# Patient Record
Sex: FEMALE | Race: WHITE | Employment: OTHER | URBAN - METROPOLITAN AREA
[De-identification: names, ages, dates, MRNs, and addresses within clinical notes are randomized per-mention and may not be internally consistent; named-entity substitution may affect disease eponyms.]

---

## 2017-01-11 ENCOUNTER — HOSPITAL ENCOUNTER (OUTPATIENT)
Dept: WOMENS IMAGING | Age: 77
Discharge: HOME | End: 2017-01-11
Attending: INTERNAL MEDICINE | Admitting: INTERNAL MEDICINE

## 2017-01-11 DIAGNOSIS — Z12.31 VISIT FOR SCREENING MAMMOGRAM: ICD-10-CM

## 2018-01-19 ENCOUNTER — HOSPITAL ENCOUNTER (OUTPATIENT)
Dept: WOMENS IMAGING | Age: 78
Discharge: HOME | End: 2018-01-19
Admitting: OBSTETRICS & GYNECOLOGY

## 2018-01-19 DIAGNOSIS — Z12.31 VISIT FOR SCREENING MAMMOGRAM: ICD-10-CM

## 2019-02-04 ENCOUNTER — HOSPITAL ENCOUNTER (OUTPATIENT)
Dept: WOMENS IMAGING | Age: 79
Discharge: HOME | End: 2019-02-04
Payer: MEDICARE

## 2019-02-04 DIAGNOSIS — Z12.31 VISIT FOR SCREENING MAMMOGRAM: ICD-10-CM

## 2019-02-04 PROCEDURE — 77063 BREAST TOMOSYNTHESIS BI: CPT

## 2020-02-13 ENCOUNTER — HOSPITAL ENCOUNTER (OUTPATIENT)
Dept: WOMENS IMAGING | Age: 80
Discharge: HOME | End: 2020-02-13
Payer: MEDICARE

## 2020-02-13 PROCEDURE — 77063 BREAST TOMOSYNTHESIS BI: CPT

## 2021-03-08 ENCOUNTER — HOSPITAL ENCOUNTER (OUTPATIENT)
Dept: WOMENS IMAGING | Age: 81
Discharge: HOME | End: 2021-03-08
Payer: MEDICARE

## 2021-03-08 DIAGNOSIS — Z12.31 BREAST CANCER SCREENING BY MAMMOGRAM: ICD-10-CM

## 2021-03-08 PROCEDURE — 77063 BREAST TOMOSYNTHESIS BI: CPT

## 2021-07-09 ENCOUNTER — APPOINTMENT (OUTPATIENT)
Dept: CT IMAGING | Age: 81
End: 2021-07-09
Payer: MEDICARE

## 2021-07-09 ENCOUNTER — HOSPITAL ENCOUNTER (OUTPATIENT)
Age: 81
Setting detail: OBSERVATION
Discharge: HOME | End: 2021-07-10
Attending: STUDENT IN AN ORGANIZED HEALTH CARE EDUCATION/TRAINING PROGRAM | Admitting: INTERNAL MEDICINE
Payer: MEDICARE

## 2021-07-09 DIAGNOSIS — R42 DIZZINESS: Primary | ICD-10-CM

## 2021-07-09 DIAGNOSIS — J39.2 NASOPHARYNGEAL MASS: ICD-10-CM

## 2021-07-09 LAB
A/G RATIO: 1.2 (ref 1.1–2.2)
ALBUMIN SERPL-MCNC: 4.2 G/DL (ref 3.4–5)
ALP BLD-CCNC: 111 U/L (ref 40–129)
ALT SERPL-CCNC: 11 U/L (ref 10–40)
ANION GAP SERPL CALCULATED.3IONS-SCNC: 13 MMOL/L (ref 3–16)
AST SERPL-CCNC: 30 U/L (ref 15–37)
BACTERIA: ABNORMAL /HPF
BASOPHILS ABSOLUTE: 0 K/UL (ref 0–0.2)
BASOPHILS RELATIVE PERCENT: 0.3 %
BILIRUB SERPL-MCNC: 0.3 MG/DL (ref 0–1)
BILIRUBIN URINE: NEGATIVE
BLOOD, URINE: NEGATIVE
BUN BLDV-MCNC: 17 MG/DL (ref 7–20)
CALCIUM SERPL-MCNC: 10.5 MG/DL (ref 8.3–10.6)
CHLORIDE BLD-SCNC: 98 MMOL/L (ref 99–110)
CLARITY: ABNORMAL
CO2: 23 MMOL/L (ref 21–32)
COLOR: YELLOW
CREAT SERPL-MCNC: 0.6 MG/DL (ref 0.6–1.2)
EOSINOPHILS ABSOLUTE: 0.1 K/UL (ref 0–0.6)
EOSINOPHILS RELATIVE PERCENT: 0.9 %
EPITHELIAL CELLS, UA: 0 /HPF (ref 0–5)
GFR AFRICAN AMERICAN: >60
GFR NON-AFRICAN AMERICAN: >60
GLOBULIN: 3.4 G/DL
GLUCOSE BLD-MCNC: 109 MG/DL (ref 70–99)
GLUCOSE URINE: NEGATIVE MG/DL
HCT VFR BLD CALC: 36.3 % (ref 36–48)
HEMOGLOBIN: 12.2 G/DL (ref 12–16)
HYALINE CASTS: 0 /LPF (ref 0–8)
KETONES, URINE: NEGATIVE MG/DL
LEUKOCYTE ESTERASE, URINE: NEGATIVE
LYMPHOCYTES ABSOLUTE: 0.6 K/UL (ref 1–5.1)
LYMPHOCYTES RELATIVE PERCENT: 7.6 %
MCH RBC QN AUTO: 29.8 PG (ref 26–34)
MCHC RBC AUTO-ENTMCNC: 33.6 G/DL (ref 31–36)
MCV RBC AUTO: 88.8 FL (ref 80–100)
MICROSCOPIC EXAMINATION: YES
MONOCYTES ABSOLUTE: 0.5 K/UL (ref 0–1.3)
MONOCYTES RELATIVE PERCENT: 6.5 %
NEUTROPHILS ABSOLUTE: 6.7 K/UL (ref 1.7–7.7)
NEUTROPHILS RELATIVE PERCENT: 84.7 %
NITRITE, URINE: POSITIVE
PDW BLD-RTO: 15.5 % (ref 12.4–15.4)
PH UA: 6.5 (ref 5–8)
PLATELET # BLD: 193 K/UL (ref 135–450)
PMV BLD AUTO: 7.7 FL (ref 5–10.5)
POTASSIUM REFLEX MAGNESIUM: 4.3 MMOL/L (ref 3.5–5.1)
PRO-BNP: 186 PG/ML (ref 0–449)
PROTEIN UA: NEGATIVE MG/DL
RBC # BLD: 4.08 M/UL (ref 4–5.2)
RBC UA: 1 /HPF (ref 0–4)
REASON FOR REJECTION: NORMAL
REJECTED TEST: NORMAL
SODIUM BLD-SCNC: 134 MMOL/L (ref 136–145)
SPECIFIC GRAVITY UA: 1.02 (ref 1–1.03)
TOTAL PROTEIN: 7.6 G/DL (ref 6.4–8.2)
TROPONIN: <0.01 NG/ML
URINE REFLEX TO CULTURE: ABNORMAL
URINE TYPE: ABNORMAL
UROBILINOGEN, URINE: 0.2 E.U./DL
WBC # BLD: 7.9 K/UL (ref 4–11)
WBC UA: 1 /HPF (ref 0–5)

## 2021-07-09 PROCEDURE — 2580000003 HC RX 258: Performed by: STUDENT IN AN ORGANIZED HEALTH CARE EDUCATION/TRAINING PROGRAM

## 2021-07-09 PROCEDURE — 6360000004 HC RX CONTRAST MEDICATION: Performed by: STUDENT IN AN ORGANIZED HEALTH CARE EDUCATION/TRAINING PROGRAM

## 2021-07-09 PROCEDURE — 84484 ASSAY OF TROPONIN QUANT: CPT

## 2021-07-09 PROCEDURE — G0378 HOSPITAL OBSERVATION PER HR: HCPCS

## 2021-07-09 PROCEDURE — 70498 CT ANGIOGRAPHY NECK: CPT

## 2021-07-09 PROCEDURE — 80053 COMPREHEN METABOLIC PANEL: CPT

## 2021-07-09 PROCEDURE — 81001 URINALYSIS AUTO W/SCOPE: CPT

## 2021-07-09 PROCEDURE — 36415 COLL VENOUS BLD VENIPUNCTURE: CPT

## 2021-07-09 PROCEDURE — 2580000003 HC RX 258: Performed by: INTERNAL MEDICINE

## 2021-07-09 PROCEDURE — 6370000000 HC RX 637 (ALT 250 FOR IP): Performed by: STUDENT IN AN ORGANIZED HEALTH CARE EDUCATION/TRAINING PROGRAM

## 2021-07-09 PROCEDURE — 99284 EMERGENCY DEPT VISIT MOD MDM: CPT

## 2021-07-09 PROCEDURE — 70450 CT HEAD/BRAIN W/O DYE: CPT

## 2021-07-09 PROCEDURE — 85025 COMPLETE CBC W/AUTO DIFF WBC: CPT

## 2021-07-09 PROCEDURE — 83880 ASSAY OF NATRIURETIC PEPTIDE: CPT

## 2021-07-09 RX ORDER — ONDANSETRON 2 MG/ML
4 INJECTION INTRAMUSCULAR; INTRAVENOUS EVERY 6 HOURS PRN
Status: DISCONTINUED | OUTPATIENT
Start: 2021-07-09 | End: 2021-07-10 | Stop reason: HOSPADM

## 2021-07-09 RX ORDER — 0.9 % SODIUM CHLORIDE 0.9 %
1000 INTRAVENOUS SOLUTION INTRAVENOUS ONCE
Status: COMPLETED | OUTPATIENT
Start: 2021-07-09 | End: 2021-07-09

## 2021-07-09 RX ORDER — POLYETHYLENE GLYCOL 3350 17 G/17G
17 POWDER, FOR SOLUTION ORAL DAILY PRN
Status: DISCONTINUED | OUTPATIENT
Start: 2021-07-09 | End: 2021-07-10 | Stop reason: HOSPADM

## 2021-07-09 RX ORDER — ALLOPURINOL 100 MG/1
100 TABLET ORAL 2 TIMES DAILY
Status: DISCONTINUED | OUTPATIENT
Start: 2021-07-09 | End: 2021-07-10 | Stop reason: HOSPADM

## 2021-07-09 RX ORDER — LEVOTHYROXINE SODIUM 88 UG/1
1 TABLET ORAL DAILY
COMMUNITY
Start: 2021-04-12

## 2021-07-09 RX ORDER — ALLOPURINOL 100 MG/1
1 TABLET ORAL 2 TIMES DAILY
COMMUNITY
Start: 2021-05-13

## 2021-07-09 RX ORDER — SODIUM CHLORIDE 0.9 % (FLUSH) 0.9 %
5-40 SYRINGE (ML) INJECTION PRN
Status: DISCONTINUED | OUTPATIENT
Start: 2021-07-09 | End: 2021-07-10 | Stop reason: HOSPADM

## 2021-07-09 RX ORDER — MECLIZINE HCL 12.5 MG/1
12.5 TABLET ORAL ONCE
Status: COMPLETED | OUTPATIENT
Start: 2021-07-09 | End: 2021-07-09

## 2021-07-09 RX ORDER — MECLIZINE HCL 12.5 MG/1
25 TABLET ORAL 3 TIMES DAILY PRN
Status: DISCONTINUED | OUTPATIENT
Start: 2021-07-09 | End: 2021-07-10 | Stop reason: HOSPADM

## 2021-07-09 RX ORDER — SODIUM CHLORIDE 9 MG/ML
25 INJECTION, SOLUTION INTRAVENOUS PRN
Status: DISCONTINUED | OUTPATIENT
Start: 2021-07-09 | End: 2021-07-10 | Stop reason: HOSPADM

## 2021-07-09 RX ORDER — ONDANSETRON 4 MG/1
4 TABLET, ORALLY DISINTEGRATING ORAL EVERY 8 HOURS PRN
Status: DISCONTINUED | OUTPATIENT
Start: 2021-07-09 | End: 2021-07-10 | Stop reason: HOSPADM

## 2021-07-09 RX ORDER — ASPIRIN 81 MG/1
81 TABLET, CHEWABLE ORAL DAILY
COMMUNITY

## 2021-07-09 RX ORDER — ACETAMINOPHEN 325 MG/1
650 TABLET ORAL EVERY 6 HOURS PRN
Status: DISCONTINUED | OUTPATIENT
Start: 2021-07-09 | End: 2021-07-10 | Stop reason: HOSPADM

## 2021-07-09 RX ORDER — SODIUM CHLORIDE 0.9 % (FLUSH) 0.9 %
5-40 SYRINGE (ML) INJECTION EVERY 12 HOURS SCHEDULED
Status: DISCONTINUED | OUTPATIENT
Start: 2021-07-09 | End: 2021-07-10 | Stop reason: HOSPADM

## 2021-07-09 RX ORDER — LEVOTHYROXINE SODIUM 88 UG/1
88 TABLET ORAL
Status: DISCONTINUED | OUTPATIENT
Start: 2021-07-10 | End: 2021-07-10 | Stop reason: HOSPADM

## 2021-07-09 RX ORDER — ASPIRIN 81 MG/1
81 TABLET, CHEWABLE ORAL DAILY
Status: DISCONTINUED | OUTPATIENT
Start: 2021-07-10 | End: 2021-07-10 | Stop reason: HOSPADM

## 2021-07-09 RX ORDER — ACETAMINOPHEN 650 MG/1
650 SUPPOSITORY RECTAL EVERY 6 HOURS PRN
Status: DISCONTINUED | OUTPATIENT
Start: 2021-07-09 | End: 2021-07-10 | Stop reason: HOSPADM

## 2021-07-09 RX ADMIN — Medication 10 ML: at 21:26

## 2021-07-09 RX ADMIN — IOPAMIDOL 75 ML: 755 INJECTION, SOLUTION INTRAVENOUS at 14:01

## 2021-07-09 RX ADMIN — MECLIZINE 12.5 MG: 12.5 TABLET ORAL at 13:34

## 2021-07-09 RX ADMIN — SODIUM CHLORIDE 1000 ML: 9 INJECTION, SOLUTION INTRAVENOUS at 13:33

## 2021-07-09 ASSESSMENT — PAIN SCALES - GENERAL: PAINLEVEL_OUTOF10: 0

## 2021-07-09 NOTE — ED NOTES
Bed: [facility]  Expected date:   Expected time:   Means of arrival: [facility]  Comments:  N/v     Clarita Burns RN  07/09/21 2829

## 2021-07-09 NOTE — ED PROVIDER NOTES
Gatherings with Friends and Family:     Attends Spiritism Services:     Active Member of Clubs or Organizations:     Attends Club or Organization Meetings:     Marital Status:    Intimate Partner Violence:     Fear of Current or Ex-Partner:     Emotionally Abused:     Physically Abused:     Sexually Abused:         Review of Systems   10 total systems reviewed and found to be negative unless otherwise noted in HPI     Physical Exam   /68   Pulse 87   Temp 97.8 °F (36.6 °C) (Oral)   Resp 18   Wt 151 lb 10.8 oz (68.8 kg)   SpO2 93%      CONSTITUTIONAL: Well appearing, in no acute distress   HEAD: atraumatic, normocephalic   EYES: PERRL, No injection, discharge or scleral icterus. ENT: Moist mucous membranes. NECK: Normal ROM, NO LAD   CARDIOVASCULAR: Regular rate and rhythm. No murmurs or gallop. PULMONARY/CHEST: Airway patent. No retractions. Breath sounds clear with good air entry bilaterally. ABDOMEN: Soft, Non-distended and non-tender, without guarding or rebound. SKIN: Acyanotic, warm, dry   MUSCULOSKELETAL: No swelling, tenderness or deformity   NEUROLOGICAL: Awake and oriented x 3. Pulses intact. Grossly nonfocal but difficulties with ambulation  Nursing note and vitals reviewed. NIH Stroke Scale     Time: 4:16 PM   Person Administering Scale: Nsehniitooniel Adan MD     Level of consciousness: [0]   0 = Alert;   1 = Not alert;   2 = Not alert;   3 = Responds only with reflex motor or autonomic effects or totally unresponsive, flaccid, and flexic. LOC questions: [0]   0 = Answers both questions correctly. 1 = Answers one question correctly. 2 = Answers neither question correctly. LOC commands: [0]   0 = Performs both tasks correctly. 1 = Performs one task correctly. 2 = Performs neither task correctly.      Best Gaze: [ 0 ]   0 = Normal   1 = Partial gaze palsy   2 = Forced deviation     Visual: [0]   0 = No visual loss   1 = Partial hemianopia   2 = Complete hemianopia   3 = Bilateral hemianopia     Facial Palsy: [0]   0 = Normal   1 = Minor paralysis   2 = Partial paralysis   3 = Complete paralysis     Motor left arm: [0]   0 = No drift;   1 = Drift   2 = Some effort against gravity;   3 = No effort against gravity;   4 = No movement. UN = Amputation     Motor right arm: [0]   0 = No drift   1 = Drift   2 = Some effort against gravity   3 = No effort against gravity   4 = No movement   UN = Amputation     Motor left leg: [0]   0 = No drift   1.= Drift   2 = Some effort against gravity   3 = No effort against gravity   4 = No movement. UN = Amputation     Motor right leg: [0]   0 = No drift   1 = Drift   2 = Some effort against gravity   3 = No effort against gravity   4 = No movement   UN = Amputation     Limb Ataxia: [0]   0 = Absent. 1 = Present in one limb. 2 = Present in two limbs   UN = Amputation     Sensory: [0]   0 = Absent   1.= Present in one limb   2 = Present in two limbs   UN = Amputation     Best Language: [0]   0 = No aphasia   1 = Mild-to-moderate aphasia   2 = Severe aphasia   3 = Mute, global aphasia     Dysarthria: [0]   1 = Mild-to-moderate dysarthria   2 = Severe dysarthria   UN = Intubated     Extinction and Inattention: [0]   0 = No abnormality.    1 = Visual, tactile, auditory, spatial, or personal inattention   2 = Profound baron-inattention or extinction to more than one modality     TOTAL: Lucy.Ring ]       ED Course & Medical Decision Making   Medications   levothyroxine (SYNTHROID) tablet 88 mcg (88 mcg Oral Given 7/10/21 0622)   aspirin chewable tablet 81 mg (81 mg Oral Given 7/10/21 0937)   allopurinol (ZYLOPRIM) tablet 100 mg (100 mg Oral Given 7/10/21 0937)   sodium chloride flush 0.9 % injection 5-40 mL (10 mLs Intravenous Given 7/10/21 0938)   sodium chloride flush 0.9 % injection 5-40 mL (has no administration in time range) 0.9 % sodium chloride infusion (has no administration in time range)   enoxaparin (LOVENOX) injection 40 mg (40 mg Subcutaneous Given 7/10/21 0938)   ondansetron (ZOFRAN-ODT) disintegrating tablet 4 mg (has no administration in time range)     Or   ondansetron (ZOFRAN) injection 4 mg (has no administration in time range)   polyethylene glycol (GLYCOLAX) packet 17 g (has no administration in time range)   acetaminophen (TYLENOL) tablet 650 mg (has no administration in time range)     Or   acetaminophen (TYLENOL) suppository 650 mg (has no administration in time range)   meclizine (ANTIVERT) tablet 25 mg (has no administration in time range)   0.9 % sodium chloride bolus (0 mLs Intravenous Stopped 7/9/21 1514)   meclizine (ANTIVERT) tablet 12.5 mg (12.5 mg Oral Given 7/9/21 1334)   iopamidol (ISOVUE-370) 76 % injection 75 mL (75 mLs Intravenous Given 7/9/21 1401)      Labs Reviewed   COMPREHENSIVE METABOLIC PANEL W/ REFLEX TO MG FOR LOW K - Abnormal; Notable for the following components:       Result Value    Sodium 134 (*)     Chloride 98 (*)     Glucose 109 (*)     All other components within normal limits    Narrative:     CALL  [place]  [facility] [phone],  Rejected Test CBCWD/Called to: LENORA Lay, 07/09/2021 13:44, by Karen Brown  Performed at:  [facility]  [address] [facility] [address]   Phone [phone]   URINE RT REFLEX TO CULTURE - Abnormal; Notable for the following components:    Clarity, UA CLOUDY (*)     Nitrite, Urine POSITIVE (*)     All other components within normal limits    Narrative:     Performed at:  [facility]  [address] [place], [place] [address]   Phone [phone]   CBC WITH AUTO DIFFERENTIAL - Abnormal; Notable for the following components:    RDW 15.5 (*)     Lymphocytes Absolute 0.6 (*)     All other components within normal limits    Narrative:     Performed at:  [facility]  [address] [place] [address]   Phone [phone]   MICROSCOPIC URINALYSIS - Abnormal; Notable for the following components:    Bacteria, UA 4+ (*)     All other components within normal limits    Narrative:     Performed at:  [facility]  [address] [address]   Phone [phone]   COMPREHENSIVE METABOLIC PANEL W/ REFLEX TO MG FOR LOW K - Abnormal; Notable for the following components:    Sodium 135 (*)     Albumin 3.3 (*)     Albumin/Globulin Ratio 1.0 (*)     ALT 9 (*)     All other components within normal limits    Narrative:     Performed at:  [facility]  [address] [address]   Phone [phone]   CBC WITH AUTO DIFFERENTIAL - Abnormal; Notable for the following components:    RDW 15.7 (*)     Lymphocytes Absolute 0.8 (*)     All other components within normal limits    Narrative:     Performed at:  [place] [address]   Phone [phone]   TROPONIN    Narrative:     Fco Pleva tel. [phone],  Rejected Test CBCWD/Called to: LENORA Plata, 07/09/2021 13:44, by Jeremy Ramesh  Performed at:  [facility]  [address] [address]   Phone [phone] PEPTIDE    Narrative:     Tsosie Mess [phone],  Rejected Test CBCWD/Called to: LENORA Plata, 07/09/2021 13:44, by Jeremy Ramesh  Performed at:  [address] [address]   Phone [phone]    Narrative:     Dorkathy Pleva tel. [phone],  Rejected Test CBCWD/Called to: LENORA Plata, 07/09/2021 13:44, by Jeremy Ramesh  Performed at:  [place] [address]   Phone [phone]   TSH WITHOUT REFLEX    Narrative:     Performed at:  [facility]  [address] [place], [place] [address]   Phone [phone]   T4, FREE    Narrative:     Performed at:  [facility]  [address] [place], [place] [address]   Phone [phone]      CT Head WO Contrast   Final Result   No acute intracranial abnormality. Soft tissue density mass in the left ethmoid air cells extending into the   left nasal passageway is nonspecific. This most likely represents a nasal   polyp. ENT referral is suggested for direct visualization and better   evaluation. CTA HEAD NECK W CONTRAST   Preliminary Result   1. No large vessel occlusion, significant stenosis or cerebral aneurysm   identified. 2. No significant arterial stenosis identified within the neck. 3. Multiple indeterminate ground-glass nodules within the upper lobes with   concomitant interstitial septal thickening concerning for an atypical   infectious process. A dedicated CT of the chest is recommended for further   evaluation. MRI BRAIN WO CONTRAST    (Results Pending)      CT Head WO Contrast    Result Date: 7/9/2021  EXAMINATION: CT OF THE HEAD WITHOUT CONTRAST  7/9/2021 2:01 pm TECHNIQUE: CT of the head was performed without the administration of intravenous contrast. Dose modulation, iterative reconstruction, and/or weight based adjustment of the mA/kV was utilized to reduce the radiation dose to as low as reasonably achievable. COMPARISON: None. HISTORY: ORDERING SYSTEM PROVIDED HISTORY: dizziness TECHNOLOGIST PROVIDED HISTORY: Reason for exam:->dizziness Has a \"code stroke\" or \"stroke alert\" been called? ->No Decision Support Exception - unselect if not a suspected or confirmed emergency medical condition->Emergency Medical Condition (MA) FINDINGS: BRAIN/VENTRICLES: There is no acute intracranial hemorrhage, mass effect or midline shift. No abnormal extra-axial fluid collection.   The gray-white differentiation is maintained without evidence of an acute infarct. There is no evidence of hydrocephalus. ORBITS: The visualized portion of the orbits demonstrate no acute abnormality. SINUSES: There is an ovoid focal soft tissue density opacity in the left ethmoid air cells. This measures approximately 2 cm in craniocaudal dimension approximately 1 cm in AP dimension and approximately 0.5 cm in transverse dimension. SOFT TISSUES/SKULL:  No acute abnormality of the visualized skull or soft tissues. No acute intracranial abnormality. Soft tissue density mass in the left ethmoid air cells extending into the left nasal passageway is nonspecific. This most likely represents a nasal polyp. ENT referral is suggested for direct visualization and better evaluation. CTA HEAD NECK W CONTRAST    Result Date: 7/9/2021  EXAMINATION: CTA OF THE HEAD AND NECK WITH CONTRAST 7/9/2021 2:01 pm: TECHNIQUE: CTA of the head and neck was performed with the administration of intravenous contrast. Multiplanar reformatted images are provided for review. MIP images are provided for review. Stenosis of the internal carotid arteries measured using NASCET criteria. Dose modulation, iterative reconstruction, and/or weight based adjustment of the mA/kV was utilized to reduce the radiation dose to as low as reasonably achievable. COMPARISON: Noncontrast CT brain earlier same day. HISTORY: ORDERING SYSTEM PROVIDED HISTORY: [facility] TECHNOLOGIST PROVIDED HISTORY: Reason for exam:->[facility] Decision Support Exception - unselect if not a suspected or confirmed emergency medical condition->Emergency Medical Condition (MA) Reason for Exam: dizziness Acuity: Acute Type of Exam: Initial FINDINGS: CTA NECK: AORTIC ARCH/ARCH VESSELS: Mild atherosclerotic calcifications are present within the aortic arch. There is no significant stenosis of the innominate or subclavian arteries identified.  CAROTID ARTERIES: The common carotid arteries are normal in appearance. There is no significant stenosis or dissection. Mild atherosclerotic calcifications are present at the origin of the left internal carotid artery. There is no significant stenosis of the internal carotid arteries based on NASCET criteria. There is no dissection or pseudoaneurysm identified. VERTEBRAL ARTERIES: No dissection, arterial injury, or significant stenosis. SOFT TISSUES: Multiple ground-glass pulmonary nodules are present within the upper lobes, incompletely evaluated. There is interstitial septal thickening within the upper lobes. There is no pathologically enlarged lymphadenopathy. There is no soft tissue mass identified. The parotid glands and submandibular glands are normal in appearance. BONES: No lytic or blastic osseous lesions are identified. There are posterior disc osteophyte complexes at C5-6 and C6-7 resulting in mild spinal canal stenosis and moderate to severe bilateral neural foraminal narrowing. 3-D surface reformations were performed and concur with the above findings. CTA HEAD: ANTERIOR CIRCULATION: The intracranial segments of the internal carotid arteries are normal.  There is no significant stenosis or aneurysm identified. The anterior and middle cerebral arteries are normal in appearance. No significant stenosis or aneurysm is identified. POSTERIOR CIRCULATION: The distal vertebral arteries are normal in appearance. The basilar artery is normal.  No significant stenosis or aneurysm is identified. The posterior cerebral arteries are normal in appearance. OTHER: No dural venous sinus thrombosis on this non-dedicated study. BRAIN: There is no mass effect or midline shift. There is no vascular malformation identified. 3-D surface reformations were performed and concur with the above findings. 1. No large vessel occlusion, significant stenosis or cerebral aneurysm identified. 2. No significant arterial stenosis identified within the neck.  3. Multiple indeterminate ground-glass nodules within the upper lobes with concomitant interstitial septal thickening concerning for an atypical infectious process. A dedicated CT of the chest is recommended for further evaluation. EKG INTERPRETATION:  EKG by my preliminary interpretation shows sinus rhythm f , normal axis, normal intervals, with no ST changes indicative of ischemia at this time. PROCEDURES:   Procedures    ASSESSMENT AND PLAN:  Lennox Ramirez is a 78 y.o. female vertigo will start on and off symptoms in the past 5 years presenting this morning complaining of severe dizziness which is different from symptoms she has had in the past.  She stated that she got up this morning on a very dizzy and lightheaded. As the symptoms was nausea with no vomiting. On exam patient appears nontoxic in no acute distress no stroke scale of 0 NIH stroke scale of 0. Nonetheless because of the significant of her symptoms I was concerned for stroke specifically posterior stroke. I did obtain a CT of the head and CT of the head and neck results showed no stroke but Soft tissue density mass in the left ethmoid air. Labs including EKG and chest x-ray showed no abnormal findings. Patient was given a liter of fluid stated that she felt better. I attempted to ambulate her the second time and patient was still not able to ambulate. At this time I do not know if her symptoms are secondary to the soft tissue mass seen in the CT on the left ear versus posterior stroke. But given the fact that patient is symptomatic unable to ambulate I am recommending that she be admitted for possible evaluation for posterior stroke with an MRI versus ENT consult for this findings on her CT scan. Hospitalist has been consulted pending admission. ClINICAL IMPRESSION:  1. Dizziness          DISPOSITION Admitted 07/09/2021 07:06:59 PM   -Findings and recommendations explained to patient and daughter.  They expressed understanding and agreed with the plan. Jane Sanchez MD (electronically signed)  7/10/2021  _________________________________________________________________________________________  Amount and/or Complexity of Data Reviewed:  Clinical lab tests: ordered and reviewed   Tests in the radiology section of CPT®: ordered and reviewed   Tests in the medicine section of CPT®: ordered and reviewed   Decide to obtain previous medical records or to obtain history from someone other than the patient: no  Obtain history from someone other than the patient: no  Review and summarize past medical records:yes  I looked up the patient in our electronic medical record:yes  Discuss the patient with other providers:yes  Independent visualization of images, tracings, or specimens:yes  Risk of Complications, Morbidity, and/or Mortality:Moderate  Presenting problems: moderate Diagnostic procedures: moderate yes Management options: moderate     _________________________________________________________________________________________  This record is transcribed utilizing voice recognition technology. There are inherent limitations in this technology. In addition, there may be limitations in editing of this report. If there are any discrepancies, please contact me directly.         Jane Sanchez MD  07/10/21 3728

## 2021-07-09 NOTE — ED NOTES
Attempted to ambulate the patient because the patients states that laying still she was no longer dizzy or nauseous. Once the patient stood up she states that he got dizzy and weak so she was put back in bed.       [facility]  07/09/21 7642

## 2021-07-09 NOTE — ED TRIAGE NOTES
Bhargavi Boateng is a 78 y.o. female was brought by EMS for dizziness and lightheadedness. The patient states that she woke up lightheaded and its been getting worse. The patient received 4mg of Zofran that helped with nausea. The patient is alert and oriented with an open and patent airway with a normal respiratory effort.

## 2021-07-09 NOTE — H&P
Hospital Medicine History & Physical      PCP: Moy Peres MD    Date of Admission: 7/9/2021    Date of Service: Pt seen/examined on 7/9/2021 and Placed in Observation. Chief Complaint:  Lightheadedness, dizziness      History Of Present Illness: The patient is a 78 y.o. female with hx pulmonary sarcoidosis, hypothyroidism, hypercalcemia 2/2 hyperparathyroidism who presents to [facility] with lightheadedness and dizziness. Patient states that she was returning from the bathroom earlier today when she felt lightheaded and dizzy. States that the room was moving around. She did not lose consciousness. Stated that the dizziness seemed to worsen with head movement and improve with rest. She has associated nausea and vomiting. Stated she had once episode several years back that was a bit different than this one. Denies any recent illness or sick contacts. She is fully vaccinated against COVID. Denies fever, chills, chest pain, shortness of breath, constipation, diarrhea, and dysuria. In the ED, labs were significant for a sodium of 134. U/A did not show significant WBCs. CT Head without contrast showed no acute intracranial abnormality, with a soft tissue density mass in the left ethmoid air cells extending into the left nasal passageway that is nonspecific. CTA Head and Neck with contrast showed no large vessel occlusion, significant stenosis or cerebral aneurysm or significant arterial stenosis within the neck, with multiple indeterminate ground-glass nodules within the upper lobes. Past Medical History:    No past medical history on file. Past Surgical History:    No past surgical history on file. Medications Prior to Admission:    Prior to Admission medications    Medication Sig Start Date End Date Taking?  Authorizing Provider allopurinol (ZYLOPRIM) 100 MG tablet Take 1 tablet by mouth 2 times daily 5/13/21  Yes Historical Provider, MD   aspirin 81 MG chewable tablet Take 81 mg by mouth daily   Yes Historical Provider, MD   levothyroxine (SYNTHROID) 88 MCG tablet Take 1 tablet by mouth daily 4/12/21  Yes Historical Provider, MD       Allergies:  Sulfa antibiotics    Social History:  The patient currently lives at home. TOBACCO:   has no history on file for tobacco use. ETOH:   has no history on file for alcohol use. Family History:  Reviewed in detail and negative for DM, Early CAD, Cancer, CVA. Positive as follows:    No family history on file. REVIEW OF SYSTEMS:   Positive for and as noted in the HPI. All other systems reviewed and negative. PHYSICAL EXAM:    BP (!) 152/93   Pulse 86   Temp 98.5 °F (36.9 °C) (Oral)   Resp 25   Wt 151 lb 10.8 oz (68.8 kg)   SpO2 97%     General appearance: No apparent distress appears stated age and cooperative. HEENT Normal cephalic, atraumatic without obvious deformity. Pupils equal, round, and reactive to light. Extra ocular muscles intact. Conjunctivae/corneas clear. Neck: Supple, No jugular venous distention/bruits. Trachea midline without thyromegaly or adenopathy with full range of motion. Lungs: Clear to auscultation, bilaterally without Rales/Wheezes/Rhonchi with good respiratory effort. Heart: Regular rate and rhythm with Normal S1/S2 without murmurs, rubs or gallops, point of maximum impulse non-displaced  Abdomen: Soft, non-tender or non-distended without rigidity or guarding and positive bowel sounds all four quadrants. Extremities: No clubbing, cyanosis, or edema bilaterally. Full range of motion without deformity and normal gait intact. Skin: Skin color, texture, turgor normal.  No rashes or lesions. Neurologic: Alert and oriented X 3, neurovascularly intact with sensory/motor intact upper extremities/lower extremities, bilaterally.   Cranial nerves: II-XII intact, grossly non-focal.  Mental status: Alert, oriented, thought content appropriate. Capillary Refill: Acceptable  < 3 seconds  Peripheral Pulses: +3 Easily felt, not easily obliterated with pressure    CT Head WO Contrast   Final Result   No acute intracranial abnormality. Soft tissue density mass in the left ethmoid air cells extending into the   left nasal passageway is nonspecific. This most likely represents a nasal   polyp. ENT referral is suggested for direct visualization and better   evaluation. CTA HEAD NECK W CONTRAST   Preliminary Result   1. No large vessel occlusion, significant stenosis or cerebral aneurysm   identified. 2. No significant arterial stenosis identified within the neck. 3. Multiple indeterminate ground-glass nodules within the upper lobes with   concomitant interstitial septal thickening concerning for an atypical   infectious process. A dedicated CT of the chest is recommended for further   evaluation. MRI BRAIN WO CONTRAST    (Results Pending)       CBC   Recent Labs     07/09/21  1313   WBC 7.9   HGB 12.2   HCT 36.3         RENAL  Recent Labs     07/09/21  1313   *   K 4.3   CL 98*   CO2 23   BUN 17   CREATININE 0.6     LFT'S  Recent Labs     07/09/21  1313   AST 30   ALT 11   BILITOT 0.3   ALKPHOS 111     COAG  No results for input(s): INR in the last 72 hours.   CARDIAC ENZYMES  Recent Labs     07/09/21  1313   TROPONINI <0.01       U/A:    Lab Results   Component Value Date    COLORU YELLOW 07/09/2021    WBCUA 1 07/09/2021    RBCUA 1 07/09/2021    BACTERIA 4+ 07/09/2021    CLARITYU CLOUDY 07/09/2021    SPECGRAV 1.025 07/09/2021    LEUKOCYTESUR Negative 07/09/2021    BLOODU Negative 07/09/2021    GLUCOSEU Negative 07/09/2021       ABG  No results found for: CHONG Marquez Peterson [place], [place]        Active Hospital Problems    Diagnosis Date Noted    Vertigo [R42] 07/09/2021         PHYSICIANS

## 2021-07-09 NOTE — ED NOTES
Patient was given a turkey sandwich and ice water.       [facility]  07/09/21 9700

## 2021-07-09 NOTE — PROGRESS NOTES
Medication Reconciliation    List of medications for Karlene Walsh is currently taking is complete. Source of information:   Epic records  Conversation with patient and family at bedside     Allergies  Sulfa antibiotics     Notes regarding home medications:   Patient received all of their home medications prior to arrival to the emergency department    Pharmacy Medication History    Medication history has been completed by: Student    Patient's Adherence: Adherent - No Missed Doses  Reasons for Response Above: patient knew her medication well. Sources of Information: Self (Patient), Family, and Pharmacy     Allergies: Allergy list reviewed, no new allergies added      The Prior to Admission Medications were reviewed and the following discrepancies were identified:  Medications prescribed, but were not listed on the Prior to Admission Medication list:  Allopurinol 100 mg, Aspirin 81 mg, and Levothyroxine 88 mcg      Medications not currently prescribed but were on the Prior to Admission Medication list (include reason): None/Not Identified       Discrepancies discovered in dose, route, or frequency on the Prior to Admission Medication list: None/Not Identified       Medications prescribed but not taking (include the reason): None/Not Identified       Other information related to the home medication history:     Pharmacy has collected and clarified patient's current medication list and updated this in EPIC, including prescriptions, over-the-counter medications, dietary, and herbal supplements.      Tone Smith, Pharmacy Student  7/9/2021 2:57 PM

## 2021-07-10 ENCOUNTER — APPOINTMENT (OUTPATIENT)
Dept: MRI IMAGING | Age: 81
End: 2021-07-10
Payer: MEDICARE

## 2021-07-10 VITALS
OXYGEN SATURATION: 93 % | RESPIRATION RATE: 18 BRPM | SYSTOLIC BLOOD PRESSURE: 113 MMHG | HEART RATE: 87 BPM | DIASTOLIC BLOOD PRESSURE: 68 MMHG | TEMPERATURE: 97.8 F | WEIGHT: 151.68 LBS

## 2021-07-10 LAB
A/G RATIO: 1 (ref 1.1–2.2)
ALBUMIN SERPL-MCNC: 3.3 G/DL (ref 3.4–5)
ALP BLD-CCNC: 94 U/L (ref 40–129)
ALT SERPL-CCNC: 9 U/L (ref 10–40)
ANION GAP SERPL CALCULATED.3IONS-SCNC: 9 MMOL/L (ref 3–16)
AST SERPL-CCNC: 36 U/L (ref 15–37)
BASOPHILS ABSOLUTE: 0 K/UL (ref 0–0.2)
BASOPHILS RELATIVE PERCENT: 0.7 %
BILIRUB SERPL-MCNC: 0.3 MG/DL (ref 0–1)
BUN BLDV-MCNC: 14 MG/DL (ref 7–20)
CALCIUM SERPL-MCNC: 9.3 MG/DL (ref 8.3–10.6)
CHLORIDE BLD-SCNC: 105 MMOL/L (ref 99–110)
CO2: 21 MMOL/L (ref 21–32)
CREAT SERPL-MCNC: 0.7 MG/DL (ref 0.6–1.2)
EOSINOPHILS ABSOLUTE: 0.4 K/UL (ref 0–0.6)
EOSINOPHILS RELATIVE PERCENT: 5.7 %
GFR AFRICAN AMERICAN: >60
GFR NON-AFRICAN AMERICAN: >60
GLOBULIN: 3.2 G/DL
GLUCOSE BLD-MCNC: 92 MG/DL (ref 70–99)
HCT VFR BLD CALC: 36.5 % (ref 36–48)
HEMOGLOBIN: 12.1 G/DL (ref 12–16)
LYMPHOCYTES ABSOLUTE: 0.8 K/UL (ref 1–5.1)
LYMPHOCYTES RELATIVE PERCENT: 11.4 %
MCH RBC QN AUTO: 29.5 PG (ref 26–34)
MCHC RBC AUTO-ENTMCNC: 33.3 G/DL (ref 31–36)
MCV RBC AUTO: 88.6 FL (ref 80–100)
MONOCYTES ABSOLUTE: 0.9 K/UL (ref 0–1.3)
MONOCYTES RELATIVE PERCENT: 12.9 %
NEUTROPHILS ABSOLUTE: 4.8 K/UL (ref 1.7–7.7)
NEUTROPHILS RELATIVE PERCENT: 69.3 %
PDW BLD-RTO: 15.7 % (ref 12.4–15.4)
PLATELET # BLD: 202 K/UL (ref 135–450)
PMV BLD AUTO: 7.9 FL (ref 5–10.5)
POTASSIUM REFLEX MAGNESIUM: 4.7 MMOL/L (ref 3.5–5.1)
RBC # BLD: 4.12 M/UL (ref 4–5.2)
SODIUM BLD-SCNC: 135 MMOL/L (ref 136–145)
T4 FREE: 1.4 NG/DL (ref 0.9–1.8)
TOTAL PROTEIN: 6.5 G/DL (ref 6.4–8.2)
TSH SERPL DL<=0.05 MIU/L-ACNC: 0.28 UIU/ML (ref 0.27–4.2)
WBC # BLD: 6.9 K/UL (ref 4–11)

## 2021-07-10 PROCEDURE — 94760 N-INVAS EAR/PLS OXIMETRY 1: CPT

## 2021-07-10 PROCEDURE — 97162 PT EVAL MOD COMPLEX 30 MIN: CPT

## 2021-07-10 PROCEDURE — G0378 HOSPITAL OBSERVATION PER HR: HCPCS

## 2021-07-10 PROCEDURE — 85025 COMPLETE CBC W/AUTO DIFF WBC: CPT

## 2021-07-10 PROCEDURE — 97535 SELF CARE MNGMENT TRAINING: CPT

## 2021-07-10 PROCEDURE — 6370000000 HC RX 637 (ALT 250 FOR IP): Performed by: INTERNAL MEDICINE

## 2021-07-10 PROCEDURE — 97116 GAIT TRAINING THERAPY: CPT

## 2021-07-10 PROCEDURE — 84443 ASSAY THYROID STIM HORMONE: CPT

## 2021-07-10 PROCEDURE — 84439 ASSAY OF FREE THYROXINE: CPT

## 2021-07-10 PROCEDURE — 36415 COLL VENOUS BLD VENIPUNCTURE: CPT

## 2021-07-10 PROCEDURE — 70551 MRI BRAIN STEM W/O DYE: CPT

## 2021-07-10 PROCEDURE — 6360000002 HC RX W HCPCS: Performed by: INTERNAL MEDICINE

## 2021-07-10 PROCEDURE — 97166 OT EVAL MOD COMPLEX 45 MIN: CPT

## 2021-07-10 PROCEDURE — 96372 THER/PROPH/DIAG INJ SC/IM: CPT

## 2021-07-10 PROCEDURE — 2580000003 HC RX 258: Performed by: INTERNAL MEDICINE

## 2021-07-10 PROCEDURE — 80053 COMPREHEN METABOLIC PANEL: CPT

## 2021-07-10 RX ADMIN — ENOXAPARIN SODIUM 40 MG: 40 INJECTION SUBCUTANEOUS at 09:38

## 2021-07-10 RX ADMIN — Medication 10 ML: at 09:38

## 2021-07-10 RX ADMIN — ALLOPURINOL 100 MG: 100 TABLET ORAL at 09:37

## 2021-07-10 RX ADMIN — LEVOTHYROXINE SODIUM 88 MCG: 0.09 TABLET ORAL at 06:22

## 2021-07-10 RX ADMIN — ASPIRIN 81 MG: 81 TABLET, CHEWABLE ORAL at 09:37

## 2021-07-10 ASSESSMENT — PAIN SCALES - GENERAL: PAINLEVEL_OUTOF10: 0

## 2021-07-10 NOTE — PLAN OF CARE
Problem: Nutrition  Goal: Optimal nutrition therapy  Outcome: Ongoing     Problem: Nutrition  Goal: Understanding of nutritional guidelines  Outcome: Ongoing     Problem: Musculor/Skeletal Functional Status  Goal: Highest potential functional level  Outcome: Ongoing     Problem: Musculor/Skeletal Functional Status  Goal: Absence of falls  Outcome: Ongoing

## 2021-07-10 NOTE — PLAN OF CARE
Problem: Nutrition  Goal: Optimal nutrition therapy  7/10/2021 1132 by Chepe Lima RN  Outcome: Ongoing     Problem: Nutrition  Goal: Understanding of nutritional guidelines  7/10/2021 1132 by Chepe Lima RN  Outcome: Ongoing     Problem: Musculor/Skeletal Functional Status  Goal: Highest potential functional level  7/10/2021 1132 by Chepe Lima RN  Outcome: Ongoing     Problem: Musculor/Skeletal Functional Status  Goal: Absence of falls  7/10/2021 1132 by Chepe Lima RN  Outcome: Ongoing     Problem: Skin Integrity:  Goal: Will show no infection signs and symptoms  Description: Will show no infection signs and symptoms  Outcome: Ongoing     Problem: Skin Integrity:  Goal: Absence of new skin breakdown  Description: Absence of new skin breakdown  Outcome: Ongoing     Problem: Falls - Risk of:  Goal: Will remain free from falls  Description: Will remain free from falls  Outcome: Ongoing     Problem: Falls - Risk of:  Goal: Absence of physical injury  Description: Absence of physical injury  Outcome: Ongoing

## 2021-07-10 NOTE — PROGRESS NOTES
Pt admitted to room 4107 per stretcher from ED. Pt is alert and oriented, pleasant and cooperative. Has no complaints at this time. Was told not to get up by herself, pt aware and able to use call light. Patrick continued. Bed alarm activated.

## 2021-07-10 NOTE — PROGRESS NOTES
Occupational Therapy   Occupational Therapy Initial Assessment  Date: 7/10/2021   Patient Name: Michelle Albarran  MRN: [id number]     : 1940    Date of Service: 7/10/2021    Discharge Recommendations:  24 hour supervision or assist, Home with assist PRN, Home with Home health OT  OT Equipment Recommendations  Other: shower chair for safety d/t decreased balance, daughter reports she has a shower chair pt can paula Albarran scored a 19/24 on the AM-PAC ADL Inpatient form. Current research shows that an AM-PAC score of 18 or greater is typically associated with a discharge to the patient's home setting. Based on the patient's AM-PAC score, and their current ADL deficits, it is recommended that the patient have 2-3 sessions per week of Occupational Therapy at d/c to increase the patient's independence. At this time, this patient demonstrates the endurance and safety to discharge home with initial 24 hour assist and a home OT follow up treatment frequency of 2-3x/wk. Please see assessment section for further patient specific details. If patient discharges prior to next session this note will serve as a discharge summary. Please see below for the latest assessment towards goals. Assessment   Performance deficits / Impairments: Decreased functional mobility ; Decreased ADL status; Decreased balance;Decreased high-level IADLs  Assessment: Pt is an 78 y.o. female admitted with vertigo. At baseline, pt lives alone and independent ADLs, IADLs, and fxl mobility using cane prn. Pt currently functioning below baseline d/t the above deficits, today requiring CGA fxl transfers/mobility with cane vs walker, CGA toileting, SBA grooming, and anticipate pt would require overall CGA for ADLs. Pt denies dizziness, but guarded with fxl mobility and ADLs, improved with use of RW. Will cont to see on acute to address the above limitations and maximize pt's safety and independence.  Anticipate pt will be able to return home with initial 24 hour assist and home OT to ensure safe transition home. Prognosis: Good  Decision Making: Medium Complexity  OT Education: OT Role;Plan of Care;ADL Adaptive Strategies;Transfer Training  REQUIRES OT FOLLOW UP: Yes  Activity Tolerance  Activity Tolerance: Patient Tolerated treatment well  Safety Devices  Safety Devices in place: Yes  Type of devices: Call light within reach; Chair alarm in place; Left in chair;Gait belt           Patient Diagnosis(es): There were no encounter diagnoses. has no past medical history on file. has no past surgical history on file. Restrictions  Restrictions/Precautions  Restrictions/Precautions: Fall Risk    Subjective   General  Chart Reviewed: Yes  Additional Pertinent Hx: Per Rosario Aggarwal MD's H&P 7/9: \"The patient is a 78 y.o. female with hx pulmonary sarcoidosis, hypothyroidism, hypercalcemia 2/2 hyperparathyroidism who presents to [facility] with lightheadedness and dizziness. Patient states that she was returning from the bathroom earlier today when she felt lightheaded and dizzy. States that the room was moving around. She did not lose consciousness. Stated that the dizziness seemed to worsen with head movement and improve with rest. She has associated nausea and vomiting. Stated she had once episode several years back that was a bit different than this one. Denies any recent illness or sick contacts. She is fully vaccinated against COVID. Denies fever, chills, chest pain, shortness of breath, constipation, diarrhea, and dysuria. In the ED, labs were significant for a sodium of 134. U/A did not show significant WBCs. CT Head without contrast showed no acute intracranial abnormality, with a soft tissue density mass in the left ethmoid air cells extending into the left nasal passageway that is nonspecific.  CTA Head and Neck with contrast showed no large vessel occlusion, significant stenosis or cerebral aneurysm or significant arterial stenosis within the neck, with multiple indeterminate ground-glass nodules within the upper lobes. \"  Family / Caregiver Present: Yes (daughter)  Referring Practitioner: Orville Trammell MD  Diagnosis: vertigo  Subjective  Subjective: Pt met b/s for OT eval/tx. Pt in bed on arrival, agreeable to participate in therapy. Pt reports symptoms of dizziness resolved. Pt denies pain. Social/Functional History  Social/Functional History  Lives With: Alone  Type of Home: House  Home Layout: Two level, Laundry in basement, Able to Live on Main level with bedroom/bathroom, 1/2 bath on main level (bedroom on 1st fran but main bathroom on 2nd floor; 12 steps to 2nd floor with L rail; B rails on basement steps)  Home Access: Stairs to enter with rails  Entrance Stairs - Number of Steps: 4  Entrance Stairs - Rails: Left  Bathroom Shower/Tub: Tub/Shower unit  Bathroom Toilet: Standard  Bathroom Equipment: Grab bars in shower, Hand-held shower, Shower chair  Bathroom Accessibility: Walker accessible (walker accessible in upstairs only)  Home Equipment: Harleen Ferries, Alert Button  ADL Assistance: Independent  Homemaking Assistance: Independent  Ambulation Assistance: Independent (uses a cane when going out but no device in the house)  Transfer Assistance: Independent  Active : Yes  Additional Comments: reports no falls recently       Objective   Vision: Impaired  Vision Exceptions: Wears glasses at all times (sometimes see double)  Hearing: Within functional limits      Orientation  Overall Orientation Status: Within Functional Limits     Observation/Palpation  Observation: no nystagmus noted with change of position or with head turns; the pt denied feeling of dizziness with any mobility today     Balance  Sitting Balance: Independent  Standing Balance: Contact guard assistance  Functional Mobility  Functional - Mobility Device: Cane  Activity: Other; To/from bathroom  Assist Level: Contact guard assistance  Functional Mobility Comments: Pt completed fxl mobility ~100 ft in hallway and into BR with no AD and CGA, from BR and ~60 ft in hallway with RW and CGA. Pt less guarded with walker, VC's for technique around turns. ADL  Feeding: Independent  Grooming: Stand by assistance (standing at sink to wash hands)  LE Dressing:  (SBA to don/doff socks, anticipate CGA for complete LB dressing)  Toileting: Contact guard assistance (external purewick catheter removed for OOB. CGA to manage clothing down/up, pericare in seated SBA.)  Additional Comments: Anticipate pt is CGA bathing and dressing based on ROM/strength, balance, endurance.      Tone RUE  RUE Tone: Normotonic  Tone LUE  LUE Tone: Normotonic  Coordination  Movements Are Fluid And Coordinated: Yes     Bed mobility  Supine to Sit: Stand by assistance  Sit to Supine: Unable to assess  Scooting: Stand by assistance     Transfers  Sit to stand: Contact guard assistance  Stand to sit: Contact guard assistance   Toilet Transfers  Toilet - Technique: Ambulating  Equipment Used: Grab bars  Toilet Transfer: Contact guard assistance  Shower Transfers  Shower Transfers Comments: discussed shower chair for safety d/t decreased balance, daughter reports she has a shower chair pt can borrow    Cognition  Overall Cognitive Status: Exceptions  Safety Judgement: Decreased awareness of need for safety;Decreased awareness of need for assistance  Insights: Decreased awareness of deficits     LUE AROM (degrees)  LUE AROM : WFL  RUE AROM (degrees)  RUE AROM : WFL     LUE Strength  Gross LUE Strength: WFL  RUE Strength  Gross RUE Strength: WFL                   Plan   Plan  Times per week: 3-5  Current Treatment Recommendations: Functional Mobility Training, Balance Training, Strengthening, Endurance Training, Safety Education & Training, Self-Care / ADL, Equipment Evaluation, Education, & procurement      AM-PAC Score        AM-PAC Inpatient Daily Activity Raw Score: 19 (07/10/21 1250)  AM-PAC Inpatient ADL T-Scale Score : 40.22 (07/10/21 1250)  ADL Inpatient CMS 0-100% Score: 42.8 (07/10/21 1250)  ADL Inpatient CMS G-Code Modifier : CK (07/10/21 1250)    Goals  Short term goals  Time Frame for Short term goals: Prior to d/c:  Short term goal 1: Pt will bathe with mod I. Short term goal 2: Pt will dress with mod I. Short term goal 3: Pt will toilet with mod I. Short term goal 4: Pt will complete fxl mobility and fxl transfers to/from ADL surfaces with CGA using LRAD. Short term goal 5: Pt will complete item retrieval/transport in room with mod I in prep for ADL/IADL task. Long term goals  Time Frame for Long term goals : STGs=LTGs  Patient Goals   Patient goals : to return home.        Therapy Time   Individual Concurrent Group Co-treatment   Time In 1200         Time Out 1230         Minutes 30         Timed Code Treatment [facility]: [facility], O[facility]/L 0939

## 2021-07-10 NOTE — DISCHARGE INSTR - COC
7/10/2021 1835  Last data filed at 7/10/2021 0931  Gross per 24 hour   Intake 390 ml   Output 700 ml   Net -310 ml     I/O last 3 completed shifts: In: 46 [P.O.:390]  Out: 700 [Urine:700]    Safety Concerns: At Risk for Falls    Impairments/Disabilities:      None    Nutrition Therapy:  Current Nutrition Therapy:   - Oral Diet:  General    Routes of Feeding: Oral  Liquids: No Restrictions  Daily Fluid Restriction: no  Last Modified Barium Swallow with Video (Video Swallowing Test): not done    Treatments at the Time of Hospital Discharge:   Respiratory Treatments: NA  Oxygen Therapy:  is not on home oxygen therapy. Ventilator:    - No ventilator support    Rehab Therapies: NA  Weight Bearing Status/Restrictions: No weight bearing restirctions  Other Medical Equipment (for information only, NOT a DME order):  walker  Other Treatments: NA    Patient's personal belongings (please select all that are sent with patient):  None    RN SIGNATURE:  Electronically signed by Sathish Lopez RN on 7/10/21 at 6:49 PM EDT    CASE MANAGEMENT/SOCIAL WORK SECTION    Inpatient Status Date: ***    Readmission Risk Assessment Score:  Readmission Risk              Risk of Unplanned Readmission:  0           Discharging to Facility/ Agency   · Name:   · Address:  · Phone:  · Fax:    Dialysis Facility (if applicable)   · Name:  · Address:  · Dialysis Schedule:  · Phone:  · Fax:    / signature: {Esignature:[id number]:::0}    PHYSICIAN SECTION    Prognosis: Good    Condition at Discharge: Stable    Rehab Potential (if transferring to Rehab): Good    Recommended Labs or Other Treatments After Discharge: meds as prescribed, follow-up with PCP    Physician Certification: I certify the above information and transfer of Rosmery Elizondo  is necessary for the continuing treatment of the diagnosis listed and that she requires Home Care for less 30 days.      Update Admission H&P: No change in H&P    PHYSICIAN SIGNATURE:  Electronically signed by Daisha Lara MD on 7/10/21 at 6:35 PM EDT

## 2021-07-10 NOTE — DISCHARGE SUMMARY
avi recommended home care and walker, orders placed  -may benefit from vestibular exercises  -meclizine PRN for dizziness given during admission     Soft tissue density in the left ethmoid air cells  -ENT consulted, she may follow-up as an outpatient  -referral placed     Hypothyroidism  -continue home meds     Pulmonary sarcoidosis  -no acute issues      Exam:     /68   Pulse 87   Temp 97.8 °F (36.6 °C) (Oral)   Resp 18   Wt 151 lb 10.8 oz (68.8 kg)   SpO2 93%       General appearance:  No apparent distress, appears stated age and cooperative. HEENT:  Normal cephalic, atraumatic without obvious deformity. Pupils equal, round, and reactive to light. Extra ocular muscles intact. Conjunctivae/corneas clear. Neck: Supple, with full range of motion. No jugular venous distention. Trachea midline. Respiratory:  Normal respiratory effort. Clear to auscultation, bilaterally without Rales/Wheezes/Rhonchi. Cardiovascular:  Regular rate and rhythm with normal S1/S2 without murmurs, rubs or gallops. Abdomen: Soft, non-tender, non-distended with normal bowel sounds. Musculoskeletal:  No clubbing, cyanosis or edema bilaterally. Full range of motion without deformity. Skin: Skin color, texture, turgor normal.  No rashes or lesions. Neurologic:  Neurovascularly intact without any focal sensory/motor deficits. Cranial nerves: II-XII intact, grossly non-focal.  Psychiatric:  Alert and oriented, thought content appropriate, normal insight  Capillary Refill: Brisk,< 3 seconds   Peripheral Pulses: +2 palpable, equal bilaterally       Labs:  For convenience and continuity at follow-up the following most recent labs are provided:      CBC:    Lab Results   Component Value Date    WBC 6.9 07/10/2021    HGB 12.1 07/10/2021    HCT 36.5 07/10/2021     07/10/2021       Renal:    Lab Results   Component Value Date     07/10/2021    K 4.7 07/10/2021     07/10/2021    CO2 21 07/10/2021    BUN 14 07/10/2021 CREATININE 0.7 07/10/2021    CALCIUM 9.3 07/10/2021    PHOS 2.4 08/12/2013         Significant Diagnostic Studies    Radiology:   MRI BRAIN WO CONTRAST   Final Result   No acute infarct. CT Head WO Contrast   Final Result   No acute intracranial abnormality. Soft tissue density mass in the left ethmoid air cells extending into the   left nasal passageway is nonspecific. This most likely represents a nasal   polyp. ENT referral is suggested for direct visualization and better   evaluation. CTA HEAD NECK W CONTRAST   Preliminary Result   1. No large vessel occlusion, significant stenosis or cerebral aneurysm   identified. 2. No significant arterial stenosis identified within the neck. 3. Multiple indeterminate ground-glass nodules within the upper lobes with   concomitant interstitial septal thickening concerning for an atypical   infectious process. A dedicated CT of the chest is recommended for further   evaluation. Consults:     IP CONSULT TO HOSPITALIST  IP CONSULT TO OTOLARYNGOLOGY  IP CONSULT TO HOME CARE NEEDS    Disposition:  Home with home health     Condition at Discharge: Stable    Discharge Instructions/Follow-up:  meds as prescribed, follow-up with PCP    Code Status:  Full Code     Activity: activity as tolerated    Diet: regular diet      Discharge Medications:     Current Discharge Medication List           Details   allopurinol (ZYLOPRIM) 100 MG tablet Take 1 tablet by mouth 2 times daily      aspirin 81 MG chewable tablet Take 81 mg by mouth daily      levothyroxine (SYNTHROID) 88 MCG tablet Take 1 tablet by mouth daily             Time Spent on discharge is more than 30 minutes in the examination, evaluation, counseling and review of medications and discharge plan. Signed:    Elaina Harrison MD   7/10/2021      Thank you Agustina Paiz MD for the opportunity to be involved in this patient's care.  If you have any questions or concerns please feel free to contact me at [phone].

## 2021-07-10 NOTE — PROGRESS NOTES
4 Eyes Skin Assessment     NAME:  Ramila Manning  YOB: 1940  MEDICAL RECORD NUMBER:  [id number]    The patient is being assess for  Admission    I agree that 2 RN's have performed a thorough Head to Toe Skin Assessment on the patient. ALL assessment sites listed below have been assessed. Areas assessed by both nurses:    Head, Face, Ears, Shoulders, Back, Chest, Arms, Elbows, Hands, Sacrum. Buttock, Coccyx, Ischium and Legs. Feet and Heels        Does the Patient have a Wound?  No noted wound(s)       Tim Prevention initiated:  No   Wound Care Orders initiated:  No    Pressure Injury (Stage 3,4, Unstageable, DTI, NWPT, and Complex wounds) if present place consult order under [de-identified] No    New and Established Ostomies if present place consult order under : No      Nurse 1 eSignature: Electronically signed by Akshat Hebert RN on 7/10/21 at 7:56 AM EDT    **SHARE this note so that the co-signing nurse is able to place an eSignature**    Nurse 2 eSignature: Electronically signed by Robin Cardona RN on 7/10/21 at 7:57 AM EDT

## 2021-07-10 NOTE — PROGRESS NOTES
Physical Therapy    Facility/Department: [facility] 4 MED SURG  Initial Assessment  If patient discharges prior to next session this note will serve as a discharge summary. Please see below for the latest assessment towards goals. NAME: Deisi Kate  : 1940  MRN: [id number]    Date of Service: 7/10/2021    Discharge Recommendations:  24 hour supervision or assist, Patient would benefit from continued therapy after discharge, S Level 1, Home with Home health PT   Deisi Kate scored a 20/24 on the AM-PAC short mobility form. Current research shows that an AM-PAC score of 18 or greater is typically associated with a discharge to the patient's home setting. Based on the patient's AM-PAC score and their current functional mobility deficits, it is recommended that the patient have 2-3 sessions per week of Physical Therapy at d/c to increase the patient's independence. At this time, this patient demonstrates the endurance and safety to discharge home with initial 24/7 assist and home PT and a follow up treatment frequency of 2-3x/wk. Please see assessment section for further patient specific details. PT Equipment Recommendations  Equipment Needed: Yes  Mobility Devices: Esther Ramirez: Rolling    Assessment   Assessment: The pt is an 79 yo female who came to the ED with c/o of feeling lightheaded and dizzy. She reports the room spinning and worsens with head movements. She did have associated nausea and vomiting. In the ED, labs were significant for a sodium of 134. U/A did not show significant WBCs. CT Head without contrast showed no acute intracranial abnormality, with a soft tissue density mass in the left ethmoid air cells extending into the left nasal passageway that is nonspecific.  CTA Head and Neck with contrast showed no large vessel occlusion, significant stenosis or cerebral aneurysm or significant arterial stenosis within the neck, with multiple indeterminate ground-glass nodules within the upper lobes. MRI completed but no results in chart. The pt lives alone in a house, sleeps on the 1st floor but has to go upstairs to shower. She reports she was indep in mobility, self-care and IADL's PTA and she still drives. PMHx: none stated        Today, the pt denied any dizziness and did not have any nystagmus with any mobility or head turns. The pt was highly guarded when walking with the cane and appeared less guarded with the walker. The pt reports she feels stiff from being in bed so long. Discussed d/c plans with the pt and she was initially not interested in the walker but with dgt's encouragement she was willing to have one at home. Anticipate that even though the pt is functioning below her baseline she will be safe for d/c to home; she would benefit from having initial 24/7 assist at home and home PT safety eval. Will get a wheeled walker for the pt for home. Will con't to follow. Prognosis: Good  Decision Making: Medium Complexity  PT Education: PT Role;General Safety;Transfer Training; Adaptive Device Training  Barriers to Learning: decreased insight  REQUIRES PT FOLLOW UP: Yes  Activity Tolerance  Activity Tolerance: Patient Tolerated treatment well       Patient Diagnosis(es): There were no encounter diagnoses. has no past medical history on file. has no past surgical history on file. Restrictions  Restrictions/Precautions  Restrictions/Precautions: Fall Risk  Vision/Hearing  Vision: Impaired  Vision Exceptions: Wears glasses at all times (sometimes see double)  Hearing: Within functional limits     Subjective  General  Chart Reviewed: Yes  Additional Pertinent Hx: Per H&P on 7-9-2021 of Daisha Lara MD: The pt is an 77 yo female who came to the ED with c/o of feeling lightheaded and dizzy. She reports the room spinning and worsens with head movements. She did have associated nausea and vomiting. In the ED, labs were significant for a sodium of 134. U/A did not show significant WBCs. CT Head without contrast showed no acute intracranial abnormality, with a soft tissue density mass in the left ethmoid air cells extending into the left nasal passageway that is nonspecific. CTA Head and Neck with contrast showed no large vessel occlusion, significant stenosis or cerebral aneurysm or significant arterial stenosis within the neck, with multiple indeterminate ground-glass nodules within the upper lobes. MRI completed but no results in chart. PMHx: none stated  Response To Previous Treatment: Not applicable  Family / Caregiver Present: Yes Berton Grandchild)  Referring Practitioner: Te Gipson MD  Referral Date : 07/09/21  Diagnosis: vertigo  Follows Commands: Within Functional Limits  Subjective  Subjective: the pt reports she is feeling better and having no dizziness at the present time; reports feeling stiff from not moving around          Orientation  Orientation  Overall Orientation Status: Within Functional Limits  Social/Functional History  Social/Functional History  Lives With: Alone  Type of Home: [address] [place] [address],[address]: Two level, Laundry in basement, Able to Live on Main level with bedroom/bathroom, 1/2 bath on main level (bedroom on 1st fran but main bathroom on 2nd floor; 12 steps to 2nd floor with L rail; B rails on basement steps)  Home Access: Stairs to enter with rails  Entrance Stairs - Number of Steps: 4  Entrance Stairs - Rails: Left  Bathroom Shower/Tub: Tub/Shower unit  Bathroom Toilet: Standard  Bathroom Equipment: Grab bars in shower, Hand-held shower, Shower chair  Bathroom Accessibility: Walker accessible (walker accessible in upstairs only)  Home Equipment: [place], Alert Button  ADL Assistance: [address] [place] [address] Avenue: Independent  Ambulation Assistance: Independent (uses a cane when going out but no device in the house)  Transfer Assistance: Independent  Active :  Yes  Additional Comments: reports no falls recently  Cognition   Cognition  Overall Cognitive Status: Exceptions  Safety Judgement: Decreased awareness of need for safety;Decreased awareness of need for assistance  Insights: Decreased awareness of deficits    Objective     Observation/Palpation  Observation: no nystagmus noted with change of position or with head turns; the pt denied feeling of dizziness with any mobility today    AROM RLE (degrees)  RLE AROM: WFL  AROM LLE (degrees)  LLE AROM : WFL  Strength RLE  Comment: hip flexion 4-/5; knee extension and ankle DF 4/5  Strength LLE  Comment: hip flexion 4-/5; knee extension and ankle DF 4/5        Bed mobility  Supine to Sit: Stand by assistance  Sit to Supine: Unable to assess  Scooting: Stand by assistance  Transfers  Sit to Stand: Contact guard assistance  Stand to sit: Contact guard assistance  Ambulation  Ambulation?: Yes  More Ambulation?: Yes  Ambulation 1  Surface: level tile  Device: Single point cane  Assistance: Contact guard assistance  Quality of Gait: guarded gait; reaches for objects with free hand to steady self; wider INDU  Distance: 100 feet x 1  Comments: no reports of dizziness with walking  Ambulation 2  Surface - 2: level tile  Device 2: Rolling Walker  Assistance 2: Contact guard assistance  Quality of Gait 2: less guarded than with the cane; still with wider INDU; tends to walk too far behind the frame of the walker but is able to correct with cues  Distance: 60 feet x 1     Balance  Sitting - Static: Good  Sitting - Dynamic: Good  Standing - Static: Good (at the sink)  Standing - Dynamic: Fair  Comments: the pt able to stand at the sink with SBA and complete hand hygiene after using the commode; the pt very guarded when walking with the cane and was less guarded when using the walker        Plan   Plan  Times per week: 3-5x/week  Current Treatment Recommendations: Functional Mobility Training, Transfer Training, Gait Training, Stair training, Safety Education & Training, Patient/Caregiver Education & Training  Safety Devices  Type of

## 2021-07-10 NOTE — PLAN OF CARE
Problem: Nutrition  Goal: Optimal nutrition therapy  7/10/2021 1827 by Simon Valiente RN  Outcome: Completed     Problem: Nutrition  Goal: Understanding of nutritional guidelines  7/10/2021 1827 by Simon Valiente RN  Outcome: Completed     Problem: Musculor/Skeletal Functional Status  Goal: Highest potential functional level  7/10/2021 1827 by Simon Valiente RN  Outcome: Completed     Problem: Musculor/Skeletal Functional Status  Goal: Absence of falls  7/10/2021 1827 by Simon Valiente RN  Outcome: Completed     Problem: Skin Integrity:  Goal: Will show no infection signs and symptoms  Description: Will show no infection signs and symptoms  7/10/2021 1827 by Simon Valiente RN  Outcome: Completed     Problem: Skin Integrity:  Goal: Absence of new skin breakdown  Description: Absence of new skin breakdown  7/10/2021 1827 by Simon Valiente RN  Outcome: Completed     Problem: Falls - Risk of:  Goal: Will remain free from falls  Description: Will remain free from falls  7/10/2021 1827 by Simon Valiente RN  Outcome: Completed     Problem: Falls - Risk of:  Goal: Absence of physical injury  Description: Absence of physical injury  7/10/2021 1827 by Simon Valiente RN  Outcome: Completed

## 2021-07-11 NOTE — CARE COORDINATION
LATE ENTRY: LSW received call on 7/10/21 from bedside RN stating that patient needed a walker. LSW spoke with patient over the phone, patient states that she has never received walker from insurance. Patient would like walker from [employer]. [facility] and instructed RN to on process of given patient walker. LSW noted on 7/11/21 that patient also had home care order and that it was not documented that walker was given. LSW spoke with patient over the phone. Patient confirmed that she obtained walker prior to discharge. LSW discussed home care, patient denies home care at this time.    Electronically signed by Cher Archer on 7/11/2021 at 3:53 PM

## 2021-07-21 ENCOUNTER — OFFICE VISIT (OUTPATIENT)
Dept: ENT CLINIC | Age: 81
End: 2021-07-21
Payer: MEDICARE

## 2021-07-21 VITALS — HEART RATE: 94 BPM | WEIGHT: 151 LBS | SYSTOLIC BLOOD PRESSURE: 133 MMHG | DIASTOLIC BLOOD PRESSURE: 78 MMHG

## 2021-07-21 DIAGNOSIS — H81.12 BENIGN PAROXYSMAL POSITIONAL VERTIGO OF LEFT EAR: ICD-10-CM

## 2021-07-21 DIAGNOSIS — J33.9 NASAL POLYP: ICD-10-CM

## 2021-07-21 DIAGNOSIS — H93.A2 PULSATILE TINNITUS OF LEFT EAR: ICD-10-CM

## 2021-07-21 DIAGNOSIS — R42 VERTIGO: Primary | ICD-10-CM

## 2021-07-21 PROCEDURE — 31231 NASAL ENDOSCOPY DX: CPT | Performed by: OTOLARYNGOLOGY

## 2021-07-21 PROCEDURE — 99204 OFFICE O/P NEW MOD 45 MIN: CPT | Performed by: OTOLARYNGOLOGY

## 2021-07-21 NOTE — PROGRESS NOTES
[facility]      Patient Name: [address] [place] [address] Record Number:  [id number]  Primary Care Physician:  Gracia Barron MD  Date of Consultation: 7/21/2021    Chief Complaint: Dizziness        HISTORY OF PRESENT ILLNESS  Elijah Barba is a(n) 78 y.o. female who presents for evaluation of dizziness and a nasal lesion. Patient was admitted to the hospital a couple of weeks ago with some dizziness. She said that she had an episode where she felt like she was going to pass out. She also felt like the room was spinning a little bit. She said it lasted for a couple of minutes, but then she had some ongoing issues afterwards. She was evaluated with both a CTA and an MRI which did not show anything. She said that she had something similar happen a while ago. She is not really been having too much dizziness since that time, but occasionally has some brief episodes. Patient does complain of some pulsatile tinnitus on occasion in the left ear. This has been the case for a number of years. During the work-up for her dizziness, the did noticed a lesion in the left ethmoids. She does not really have a lot of nasal issues          REVIEW OF SYSTEMS  As above    PHYSICAL EXAM  GENERAL: No Acute Distress, Alert and Oriented, no Hoarseness, strong voice  EYES: EOMI, Anti-icteric  HENT:   Head: Normocephalic and atraumatic. Face:  Symmetric, facial nerve intact, no sinus tenderness  Right Ear: Normal external ear, normal external auditory canal, intact tympanic membrane with normal mobility and aerated middle ear  Left Ear: Normal external ear, normal external auditory canal, intact tympanic membrane with normal mobility and aerated middle ear  Mouth/Oral Cavity:  normal lips, Uvula is midline, no mucosal lesions  Oropharynx/Larynx:  normal oropharynx, absent tonsils  Nose:Normal external nasal appearance.   See below  NECK: Normal range of motion, no thyromegaly, trachea is midline, no lymphadenopathy, no neck masses, no crepitus  CHEST: Normal respiratory effort, no retractions, breathing comfortably  SKIN: No rashes, normal appearing skin, no evidence of skin lesions/tumors  Neuro: Patient had some subjective dizziness with the right ear down [place]. She however had jean vertigo with nystagmus with the left ear down [place]Hallpike  Cardio:  no edema        RADIOLOGY  Summary of findings:  I reviewed the CT and MRI. She has what appears to be small polyp of the left nasal cavity. No evidence of sinusitis      PROCEDURE  Nasal endoscopy  Afrin and lidocaine were applied the bilateral nasal cavity  Rigid scope was used to visualize the bilateral nasal cavity. On the left side the patient had what appeared to be a benign polyp associated with the lateral aspect of the middle turbinate. Did not appear to be obstructing the nasal cavity. No other polyps or purulent drainage noted. Right-sided not appreciate any polyps or purulent drainage. ASSESSMENT/PLAN  1. Vertigo  This patient seems to have BPPV of the left side. This would explain some of her dizziness. She also however has some presyncopal episodes and other issues. I am not sure that the BPPV explains everything, but addressing this certainly will make things better. She had some back pain and difficulty doing the [place], so I was unable to do the [place]. I would have her see her physical therapist to do this. - PT vestibular rehab; Future    2. Benign paroxysmal positional vertigo of left ear  See physical therapy for evaluation and Epley    3. Nasal polyp  This is a benign-appearing nasal polyp. I do not think we need to biopsy this. Is not causing any issues at this time    4.  Pulsatile tinnitus of left ear  I would like for the patient to follow-up with a hearing test.  She is already had a CTA so I do not think we need to do any more evaluation for the pulsatile

## 2021-08-02 ENCOUNTER — HOSPITAL ENCOUNTER (OUTPATIENT)
Dept: PHYSICAL THERAPY | Age: 81
Setting detail: THERAPIES SERIES
Discharge: HOME | End: 2021-08-02
Payer: MEDICARE

## 2021-08-02 PROCEDURE — 97112 NEUROMUSCULAR REEDUCATION: CPT

## 2021-08-02 PROCEDURE — 97530 THERAPEUTIC ACTIVITIES: CPT

## 2021-08-02 PROCEDURE — 97162 PT EVAL MOD COMPLEX 30 MIN: CPT

## 2021-08-02 NOTE — PLAN OF CARE
[address] [facility],[address], [facility]  [address] [facility]  Phone: [phone]   Fax:     [phone]                                                       Physical Therapy Certification    Dear Referring Practitioner: Gertrude Gamez MD,    We had the pleasure of evaluating the following patient for physical therapy services at [address]. A summary of our findings can be found in the initial assessment below. This includes our plan of care. If you have any questions or concerns regarding these findings, please do not hesitate to contact me at the office phone number checked above. Thank you for the referral.       Physician Signature:_______________________________Date:__________________  By signing above (or electronic signature), therapists plan is approved by physician          Patient: Prabha Price   : 1940   MRN: [id number]  Referring Physician: Referring Practitioner: Gertrude Gamez MD      Evaluation Date: 2021        Medical Diagnosis Information:  Diagnosis: Vertigo   Treatment Diagnosis: BPPV, vestibular impairment                                           Insurance information: PT Insurance Information: BCBS Medicare (requires auth)     Precautions/ Contra-indications: none  Latex Allergy:  [x]NO      []YES  Preferred Language for Healthcare:   [x]English       []other:    C-SSRS Triggered by Intake questionnaire (Past 2 wk assessment ):   [x] No, Questionnaire did not trigger screening.   [] Yes, Patient intake triggered C-SSRS Screening      [] C-SSRS Screening completed  [] PCP notified via Epic     SUBJECTIVE: Patient stated complaint: On , patient states she felt lightheaded all day. In late morning, when she stood in front of her chair after walking for the bathroom she got intense spinning sensation with dizziness that caused her to fall back into her chair.   Notes she had this in the past due to too much calcium in 2013. Family took patient to the ER due to patient was not able to even sit up without becoming dizzy. At ER, CT scan and MRI were performed and came back negative. Patient was then referred to the ENT. Notes she got dizziness in the ENT office during his testing. Patient notes she has not had any other episodes of dizziness but feels like her balance is off. Relevant Medical History: Additional Pertinent Hx: Sarcodosis, OA  Pain Scale: 0/10     Type: []Constant   [x]Intermittent  []Radiating []Localized []other:    Dizziness Scale: 0/10    Description of symptoms: [x] Vertigo [x]  Off-balance [] Lightheadedness    Symptoms are getting:  [x] Better [] Worse  [] Same      [] Episodic    Description of Spells: [] Constant [] Spontaneous [] Motion Induced [x] Induced by position changes [] Other:    Length of time spells occur: [x] Seconds [] Minutes  [] Hours [] Days [] Other:     Date of onset: 7/9/21    Setting in which symptoms first occurred: standing in front of recliner    What increases/provokes symptoms? Moving too quickly    What decreases/eases symptoms? rest    Hearing impairments:  [] Yes  [x] No  [] Other:     Hearing changes since onset:  [] Yes  [x] No  [] Other:    Visual changes since onset: [] Yes  [x] No  [] Other:    Recent falls:    [] Yes  [x] No     Comments: 1 other fall recently      History of migraines/HA:  [] Yes  [x] No    Comments:    Previous treatments: none    Job requirements/work status: retired    Living Status/Prior Level of Function: Prior to this injury / incident, patient was independent with ADLs and IADLs, able to do transfers without dizziness.      OBJECTIVE:     Musculoskeletal Screen  Cervical spine complaints:   Cervical Pain: 3/10  Cervical spine ROM:  []  WNL [x] Impaired: limited due to joint stiffness     LE ROM: B=WNL    LE Strength:   LEFT RIGHT    Hip flexion 4+/5 4+/5    Hip IR 4+/5 4+/5    Hip ER 4+/5 4+/5    Knee extension 4+/5 4+/5    Knee flexion 4+/5 4+/5    Ankle 4+/5 4+/5     Posture:      Somatosensory:  Light touch:  [x] Normal [] Impaired Comments:    Coordination:  Rapid alternating movements: [x] Normal [] Dysdiadokinesia   Finger to Nose:   [x] Normal [] Dysmetric  Heel to Shin:    [x] Normal [] Ataxic    Postural Control Tests:  Clinical Test of Sensory Interaction for Balance (CTSIB) performed in Romberg stance  CONDITION TIME STRATEGY SWAY    Eyes open, firm surface x30 sec ankle min    Eyes closed, firm surface x30 sec ankle min    Eyes open, foam surface x15 sec hip mod    Eyes closed, foam surface x5 sec Hip mod     Gait:      Oculomotor/Vestibular Examination:    Spontaneous nystagmus:  [] Left  [] Right [x] Absent  Gaze-Evoked nystagmus with fixation present:   Primary [] Present [x] Absent   Right  [] Present [x] Absent   Left  [] Present [x] Absent  VOR Head Thrust Test: [] Normal [x] Abnormal  Comments: (+) with fast beat to the left  VOR Cancellation:  [x] Normal [] Abnormal Comments:   Smooth Pursuit:  [x] Normal [] Abnormal Comments:   Saccades:   [x] Normal [] Abnormal Comments:   Convergence:   [x] Normal [] Abnormal Comments:     Positional Testing  R Hallpike-Yuliya maneuver:    Nystagmus:  [x] Yes  [] No  [x] Duration: 25 secs      [x] Direction: upbeating nystagmus to the right; more intense than on left    Vertigo:  [x] Yes  [] No  [x] Duration: 30 secs  L Hallpike-Uyliya maneuver:   Nystagmus:  [x] Yes  [] No  [x] Duration: 5 secs      [x] Direction: upbeating nystagmus to the left; less intense than on right    Vertigo:  [x] Yes  [] No  [x] Duration: 5 secs  Supine roll head right:   Nystagmus:  [] Yes  [x] No  [] Duration:       [] Direction:    Vertigo:  [] Yes  [x] No  [] Duration:   Supine roll head left:   Nystagmus:  [] Yes  [x] No  [] Duration:       [] Direction:    Vertigo:  [] Yes  [x] No  [] Duration:     Outcome Measures  Dizziness Handicap Index ([address] [place] [address])    24      [x] Patient history, allergies, meds reviewed. Medical chart reviewed. See intake form. Review of Systems (ROS):  [x]Performed Review of systems (Integumentary, Cardiopulmonary, Neurological) by intake and observation. Intake form has been scanned into medical record. Patient has been instructed to contact their primary care physician regarding ROS issues if not already being addressed at this time.       Co-morbidities/Complexities (which will affect course of rehabilitation):  []None           Arthritic conditions   []Rheumatoid arthritis (M05.9)  []Osteoarthritis (M19.91)   Cardiovascular conditions   []Hypertension (I10)  []Hyperlipidemia (E78.5)  []Angina pectoris (I20)  []Atherosclerosis (I70)   Musculoskeletal conditions   []Disc pathology   []Congenital spine pathologies   []Prior surgical intervention  []Osteoporosis (M81.8)  []Osteopenia (M85.8)   Endocrine conditions   []Hypothyroid (E03.9)  []Hyperthyroid Gastrointestinal conditions   []Constipation (T83.81)   Metabolic conditions   []Morbid obesity (E66.01)  []Diabetes type 1(E10.65) or 2 (E11.65)   []Neuropathy (G60.9)     Pulmonary conditions   []Asthma (J45)  [x]Sarcodosis  []COPD (J44.9)   Psychological Disorders  []Anxiety (F41.9)  []Depression (F32.9)   []Other:   []Other:           Barriers to/and or personal factors that will affect rehab potential:              []Age  []Sex    []Smoker              []Motivation/Lack of Motivation                        [x]Co-Morbidities              []Cognitive Function, education/learning barriers              []Environmental, home barriers              []profession/work barriers  []past PT/medical experience  []other:  Justification: neck and back pain make it difficult to perform canalith repositioning maneuver    ASSESSMENT:      Functional Impairments:  Problem List/Functional Limitations:   [x] BPPV    [x] Right [x] Posterior Canal [x] Canalithiasis    [x] Left  [] Horizontal Canal [] Cupulolithiasis   [] Decreased Gaze Stabilization    [] Increased Motion Sensitivity   [] Unilateral Vestibular Hypofunction [] Bilateral Vestibular Hypofunction   [x] Gait Instability    [x] Decreased tolerance for ADLs    [] Decreased functional strength   [x] Reduced Balance/Proprioceptive control   [] Reduced ability to hear/focus   [] Noted cervical/thoracic/GHJ joint hypomobility   [] Noted cervical/thoracic/GHJ joint hypermobility   [] Decreased cervical/UE functional ROM   [] Noted Headache pain aggravated by neck movements with/without dizziness   [] Abnormal reflexes/sensation/myotomal/dermatomal deficits   [] Decreased DCF control or ability to hold head up   [] Decreased RC/scapular/core strength and neuromuscular control     Functional Activity Limitations (from functional questionnaire and intake)   []Reduced ability to tolerate prolonged functional positions   [x]Reduced ability or difficulty with changes of positions or transfers between positions  [x]Reduced ability to transfer in/out of bed or rolling in bed   [x]Reduced ability or tolerance with driving, reading and/or computer work   []Reduced ability to perform lifting, reaching, carrying tasks   []Reduced ability to forward bend   []Reduced ability to ambulate prolonged functional periods/distances/surfaces   []Reduced ability to ascend/descend stairs  []Reduced ability to concentrate/focus  [x]Reduced ability to turn/pitch head rapidly  [x]Reduced ability to self-correct for losses of balance []other:       Participation Restrictions   [x]Reduced participation in self-care activities   [x]Reduced participation in home management activities   []Reduced participation in work activities   [x]Reduced participation in social activities   []Reduced participation in sport/recreational activities    Classification:   [x]Signs/symptoms consistent with BPPV (benign paroxysmal positional vertigo)      []Signs/symptoms consistent with unilateral peripheral vestibulopathy (i.e., vestibular neuritis, labyrinthitis, acoustic neuroma)   []Signs/symptoms consistent with central vestibulopathy  []Signs/symptoms consistent with migraine-related vestibulopathy  []Signs/symptoms consistent with Meniere's disease / post-traumatic hydrops  []Signs/symptoms consistent with perilymphatic fistula   []Signs/symptoms consistent with cervicogenic dizziness  []Signs/symptoms consistent with gait instability   []Signs/symptoms consistent with motion sensitivity    []signs/symptoms consistent with neck pain with mobility deficits     []signs/symptoms consistent with neck pain with movement coordinated impairments    []signs/symptoms consistent with neck pain with radiating pain    []signs/symptoms consistent with neck pain with headaches (cervicogenic)    []Signs/symptoms consistent with nerve root involvement including myotome & dermatome dysfunction   []sign/symptoms consistent with facet dysfunction of cervical and thoracic spine    []signs/symptoms consistent suggesting central cord compression/UMN syndromes   []signs/symptoms consistent with discogenic cervical pain   []signs/symptoms consistent with rib dysfunction   []signs/symptoms consistent with postural dysfunction   []signs/symptoms consistent with shoulder pathology    []signs/symptoms consistent with post-surgical status including decreased ROM, strength and function.    []signs/symptoms consistent with pathology which may benefit from Dry Needling  []signs/symptoms which may limit the use of advanced manual therapy techniques: (Elevated CV risk profile, recent trauma, intolerance to end range positions, prior TIA, visual issues, UE neurological compromise)   []other:      Prognosis/Rehab Potential:      [x]Excellent   []Good    []Fair   []Poor    Tolerance of evaluation/treatment:    []Excellent   [x]Good    []Fair   []Poor     Physical Therapy Evaluation Complexity Justification  [x] A history of present problem with:  [] no personal factors and/or comorbidities that impact the plan of care;  [x]1-2 personal factors and/or comorbidities that impact the plan of care  []3 personal factors and/or comorbidities that impact the plan of care  [x] An examination of body systems using standardized tests and measures addressing any of the following: body structures and functions (impairments), activity limitations, and/or participation restrictions;:  [] a total of 1-2 or more elements   [x] a total of 3 or more elements   [] a total of 4 or more elements   [x] A clinical presentation with:  [] stable and/or uncomplicated characteristics   [x] evolving clinical presentation with changing characteristics  [] unstable and unpredictable characteristics;   [x] Clinical decision making of [] low, [x] moderate, [] high complexity using standardized patient assessment instrument and/or measurable assessment of functional outcome. [] EVAL (LOW) 66715 (typically 15 minutes face-to-face)  [x] EVAL (MOD) 96169 (typically 30 minutes face-to-face)  [] EVAL (HIGH) 46920 (typically 45 minutes face-to-face)  [] RE-EVAL     HEP instruction: Written HEP instructions provided and reviewed. GOALS:  Patient stated goal: \"Get rid of my dizziness\"  [] Progressing: [] Met: [] Not Met: [] Adjusted    Therapist goals for Patient:   Short Term Goals: To be achieved in: 2 weeks  1. Independent in HEP and progression per patient tolerance, in order to prevent re-injury. [] Progressing: [] Met: [] Not Met: [] Adjusted  2. Patient will have a decrease in dizziness/imbalance/symptoms by 40% to facilitate improvement in movement, function, balance and ADLs as indicated by Functional Deficits. [] Progressing: [] Met: [] Not Met: [] Adjusted    Long Term Goals: To be achieved in: at discharge  1. Disability index score of 25% or less for the [facility] to assist with reaching prior level of function. [] Progressing: [] Met: [] Not Met: [] Adjusted  2.  Patient will demonstrate negative oculomotor special testing/positional testing as indicated by patients Functional Deficits. [] Progressing: [] Met: [] Not Met: [] Adjusted  3. Patient will return to functional activities including rolling in bed and transfers without increased symptoms or restriction. [] Progressing: [] Met: [] Not Met: [] Adjusted  4. Patient will return to driving without symptoms or restriction  [] Progressing: [] Met: [] Not Met: [] Adjusted     PLAN:   Today's Treatment:    [x] See flowsheet   [x] Patient treated with canalith repositioning maneuver   [x] Education materials provided on BPPV/Vestibular Dysfunction/Habituation   [x] Precautions provided and patient to follow precautions for next 24 hours in regards to BPPV management   [] Written home exercise instructions   [] Other:    Frequency/Duration:  1-2 days per week for 4 Weeks:  Interventions:  [x]  Therapeutic exercise including: strength training, ROM, for LEs, cervical spine & UEs  [x]  NMR activation and proprioception for BLEs, vestibular training/habituation, balance, coordination  [x]  Manual therapy as indicated via: canalith repositioning maneuvers, Dry Needling/IASTM, STM, PROM, Gr I-IV mobilizations, manipulation. [x]  Modalities as needed that may include: thermal agents, E-stim, Biofeedback, US, iontophoresis as indicated  [x]  Gait training  [x]  Patient education on BPPV/vestibular function, balance, postural re-education, activity modification, progression of HEP. Electronically signed by:  Deonna Walker PT , OMT-C,  [id number]    Note: If patient does not return for scheduled/recommended follow up visits, this note will serve as a discharge from care along with the most recent update on progress.

## 2021-08-02 NOTE — FLOWSHEET NOTE
any other episodes of dizziness but feels like her balance is off. SUBJECTIVE:  See eval    OBJECTIVE:   Oculomotor/Vestibular Examination:     Spontaneous nystagmus:       []? Left             []? Right           [x]? Absent  Gaze-Evoked nystagmus with fixation present:              Primary            []? Present       [x]? Absent              Right                []? Present       [x]? Absent              Left                  []? Present       [x]? Absent  VOR Head Thrust Test:          []? Normal       [x]? Abnormal    Comments: (+) with fast beat to the left  VOR Cancellation:                  [x]? Normal       []? Abnormal    Comments:   Smooth Pursuit:                      [x]? Normal       []? Abnormal    Comments:   Saccades:                               [x]? Normal       []? Abnormal    Comments:   Convergence:                          [x]? Normal       []? Abnormal    Comments:      Positional Testing  R Hallpike-Grainfield maneuver:               Nystagmus:     [x]? Yes             []? No               [x]? Duration: 25 secs                                         [x]? Direction: upbeating nystagmus to the right; more intense than on left               Vertigo:            [x]? Yes             []? No               [x]? Duration: 30 secs  L Hallpike-Grainfield maneuver:              Nystagmus:     [x]? Yes             []? No               [x]? Duration: 5 secs                                           [x]? Direction: upbeating nystagmus to the left; less intense than on right                  Vertigo:            [x]? Yes             []? No               [x]? Duration: 5 secs  Supine roll head right:              Nystagmus:     []? Yes             [x]? No               []? Duration:                                          []? Direction:                  Vertigo:            []? Yes             [x]? No               []? Duration:   Supine roll head left:              Nystagmus:     []? Yes             [x]?  No []? Duration:                                          []? Direction:                  Vertigo:            []? Yes             [x]? No               []? Duration:     RESTRICTIONS/PRECAUTIONS: none    Exercises/Interventions:     Therapeutic Exercises (18740) Min: Resistance/Reps Notes/Cues                            Therapeutic Activities (41488) Min:15     Education on BPPV effects on transfers, transitional movements, and balance with ADLs x15 mins                        Neuromuscular Re-ed (59146) Min: 15     Canalith Repositioning Procedure For R posterior canalithiasis  2x - epley maneuver 8/2: very mild nystagmus noted after 1st CRP             Manual Intervention (72513) Min:                Modalities  Min:                      Other Therapeutic Activities: Pt was educated on PT POC, Diagnosis, Prognosis, pathomechanics as well as frequency and duration of scheduling future physical therapy appointments. Time was also taken on this day to answer all patient questions and participation in PT. Reviewed appointment policy in detail with patient and patient verbalized understanding. Home Exercise Program: Patient was instructed to avoid quick head movements and excessive bending over for the next 24 hours. Patient verbalized understanding and was issued written handout. Therapeutic Exercise and NMR EXR  [] (94273) Provided verbal/tactile cueing for activities related to strengthening, flexibility, endurance, ROM for improvements in LE, proximal hip, and core control with self care, mobility, lifting, ambulation. [x] (32582) Provided verbal/tactile cueing for activities related to improving balance, coordination, kinesthetic sense, posture, motor skill, proprioception  to assist with LE, proximal hip, and core control in self care, mobility, lifting, ambulation and eccentric single leg control.  [address] and Therapeutic Activities:    [x] (12474 or 80489) Provided verbal/tactile cueing for activities related to improving balance, coordination, kinesthetic sense, posture, motor skill, proprioception and motor activation to allow for proper function of core, proximal hip and LE with self care and ADLs and functional mobility.   [] (99638) Gait Re-education- Provided training and instruction to the patient for proper LE, core and proximal hip recruitment and positioning and eccentric body weight control with ambulation re-education including up and down stairs     Home Exercise Program:    [] (95584) Reviewed/Progressed HEP activities related to strengthening, flexibility, endurance, ROM of core, proximal hip and LE for functional self-care, mobility, lifting and ambulation/stair navigation   [x] (47584)Reviewed/Progressed HEP activities related to improving balance, coordination, kinesthetic sense, posture, motor skill, proprioception of core, proximal hip and LE for self care, mobility, lifting, and ambulation/stair navigation      Manual Treatments:  PROM / STM / Oscillations-Mobs:  G-I, II, III, IV (PA's, Inf., Post.)  [] (09149) Provided manual therapy to mobilize LE, proximal hip and/or LS spine soft tissue/joints for the purpose of modulating pain, promoting relaxation,  increasing ROM, reducing/eliminating soft tissue swelling/inflammation/restriction, improving soft tissue extensibility and allowing for proper ROM for normal function with self care, mobility, lifting and ambulation.      Charges:  Timed Code Treatment Minutes: 30   Total Treatment Minutes: 55      [] EVAL (LOW) 86431 (typically 20 minutes face-to-face)  [x] EVAL (MOD) 17806 (typically 30 minutes face-to-face)  [] EVAL (HIGH) 37849 (typically 45 minutes face-to-face)  [] RE-EVAL     [] DJ(88495) x     [] Dry needle 1 or 2 Muscles (65310)  [x] NMR (09832) x     [] Dry needle 3+ Muscles (49118)  [] Manual (82554) x     [] Ultrasound (95735) x  [x] TA (74793) x     [] Mech Traction (03981)  [] ES(attended) (41509)     [] ES (un) (30579):   [] Vasopump (62999) [] Ionto (43373)   [] Other:    GOALS:  Patient stated goal: \"Get rid of my dizziness\"  []? Progressing: []? Met: []? Not Met: []? Adjusted     Therapist goals for Patient:   Short Term Goals: To be achieved in: 2 weeks  1. Independent in HEP and progression per patient tolerance, in order to prevent re-injury. []? Progressing: []? Met: []? Not Met: []? Adjusted  2. Patient will have a decrease in dizziness/imbalance/symptoms by 40% to facilitate improvement in movement, function, balance and ADLs as indicated by Functional Deficits. []? Progressing: []? Met: []? Not Met: []? Adjusted     Long Term Goals: To be achieved in: at discharge  1. Disability index score of 25% or less for the [facility] to assist with reaching prior level of function. []? Progressing: []? Met: []? Not Met: []? Adjusted  2. Patient will demonstrate negative oculomotor special testing/positional testing as indicated by patients Functional Deficits. []? Progressing: []? Met: []? Not Met: []? Adjusted  3. Patient will return to functional activities including rolling in bed and transfers without increased symptoms or restriction. []? Progressing: []? Met: []? Not Met: []? Adjusted  4. Patient will return to driving without symptoms or restriction  []? Progressing: []? Met: []? Not Met: []? Adjusted         ASSESSMENT:  See eval    Treatment/Activity Tolerance:  [x] Patient tolerated treatment well [] Patient limited by fatique  [] Patient limited by pain  [] Patient limited by other medical complications  [] Other:     Overall Progression Towards Functional goals/ Treatment Progress Update:  [] Patient is progressing as expected towards functional goals listed. [] Progression is slowed due to complexities/Impairments listed. [] Progression has been slowed due to co-morbidities.   [x] Plan just implemented, too soon to assess goals progression <30days   [] Goals require adjustment due to lack of progress  [] Patient is not progressing as expected and requires additional follow up with physician  [] Other    Prognosis for POC: [x] Good [] Fair  [] Poor    Patient requires continued skilled intervention: [x] Yes  [] No      PLAN: Reassess positional testing. [] Continue per plan of care [] Alter current plan (see comments)  [x] Plan of care initiated [] Hold pending MD visit [] Discharge    Electronically signed by: Vinod Wade PT , OMT-C,  [id number]      Note: If patient does not return for scheduled/recommended follow up visits, this note will serve as a discharge from care along with the most recent update on progress.

## 2021-08-03 ENCOUNTER — TELEPHONE (OUTPATIENT)
Dept: ENT CLINIC | Age: 81
End: 2021-08-03

## 2021-08-03 NOTE — TELEPHONE ENCOUNTER
The patient can do the home exercises for her dizziness provided her. Additionally she could call the physical therapist to see if they have anything to add as I am not sure what they did. If she is that weak and unsteady, evaluation in the emergency department could be considered, but it sounds like this may be related to her ear.

## 2021-08-03 NOTE — TELEPHONE ENCOUNTER
Patient was referred by Dr Magaly Box for therapy for vertigo . She had first session yesterday and did well until this morning. Patient woke up dizzy and is very weak. Daughter is concerned about leaving her alone. Is there anything that she can do to help. Tessa Charlton, the daughter called. Her phone number is   [phone]    Patient has another therapy session tomorrow.

## 2021-08-04 ENCOUNTER — HOSPITAL ENCOUNTER (OUTPATIENT)
Dept: PHYSICAL THERAPY | Age: 81
Setting detail: THERAPIES SERIES
Discharge: HOME | End: 2021-08-04
Payer: MEDICARE

## 2021-08-04 PROCEDURE — 97112 NEUROMUSCULAR REEDUCATION: CPT

## 2021-08-04 NOTE — FLOWSHEET NOTE
[facility], [facility]  [address] [facility] [place], [facility]  Phone: [phone]   Fax:     [phone]      Physical Therapy Treatment Note/ Progress Report:   Date:  2021    Patient Name:  Elvira Nichols    :  1940  MRN: [id number]    Pertinent Medical History:  Sarcodosis, OA    Medical/Treatment Diagnosis Information:  · Diagnosis: Vertigo  · Treatment Diagnosis: BPPV, vestibular impairment    Insurance/Certification information:  PT Insurance Information: [facility] Medicare (requires Jossy Cardona)  Physician Information:  Referring Practitioner: Aristides Vasquez MD  Plan of care signed (Y/N): []  Yes [x]  No     Date of Patient follow up with Physician:      Progress Report: []  Yes  [x]  No     Date Range for reporting period:  Beginnin2021  Ending:     Progress report due (10 Rx/or 30 days whichever is less):  87    Recertification due (POC duration/ or 90 days whichever is less):  21    Visit # Insurance Allowable Auth required? Date Range   2 1 [x]  Yes []  No      Latex Allergy:  [x]NO      []YES  Preferred Language for Healthcare:   [x]English       []other:    Functional Outcomes Measure:   Date Assessed: at eval  Test: [facility]  Score: 24    Pain level:  3/10 neck, LB  Dizziness Level: 1/10     History of Injury:On , patient states she felt lightheaded all day. In late morning, when she stood in front of her chair after walking for the bathroom she got intense spinning sensation with dizziness that caused her to fall back into her chair. Notes she had this in the past due to too much calcium in . Family took patient to the ER due to patient was not able to even sit up without becoming dizzy. At ER, CT scan and MRI were performed and came back negative. Patient was then referred to the ENT. Notes she got dizziness in the ENT office during his testing.   Patient notes she has not had any other episodes of dizziness but feels like her balance is off. SUBJECTIVE:  8/4: Patient notes that the night after her 1st visit she felt really good. Yesterday though when she woke up her dizziness was extremely bad. Had difficulty moving around. Family stayed with her yesterday because she felt so bad. Notes being a little unsteady today but not dizzy. OBJECTIVE:   Oculomotor/Vestibular Examination:     Spontaneous nystagmus:       []? Left             []? Right           [x]? Absent  Gaze-Evoked nystagmus with fixation present:              Primary            []? Present       [x]? Absent              Right                []? Present       [x]? Absent              Left                  []? Present       [x]? Absent  VOR Head Thrust Test:          []? Normal       [x]? Abnormal    Comments: (+) with fast beat to the left  VOR Cancellation:                  [x]? Normal       []? Abnormal    Comments:   Smooth Pursuit:                      [x]? Normal       []? Abnormal    Comments:   Saccades:                               [x]? Normal       []? Abnormal    Comments:   Convergence:                          [x]? Normal       []? Abnormal    Comments:      Positional Testing - Updated 8/4/21  R Hallpike-Yuliya maneuver:               Nystagmus:     [x]? Yes             []? No               [x]? Duration: 25 secs                                         [x]? Direction: upbeating nystagmus to the right; more intense than on left               Vertigo:            [x]? Yes             []? No               [x]? Duration: 30 secs  L Hallpike-Yuliya maneuver:              Nystagmus:     [x]? Yes             []? No               [x]? Duration: 5 secs                                           [x]? Direction: upbeating nystagmus to the left; less intense than on right                  Vertigo:            [x]? Yes             []? No               [x]?  Duration: 5 secs  Supine roll head right:              Nystagmus: []? Yes             [x]? No               []? Duration:                                          []? Direction:                  Vertigo:            []? Yes             [x]? No               []? Duration:   Supine roll head left:              Nystagmus:     []? Yes             [x]? No               []? Duration:                                          []? Direction:                  Vertigo:            []? Yes             [x]? No               []? Duration:     RESTRICTIONS/PRECAUTIONS: none    Exercises/Interventions:     Therapeutic Exercises (99113) Min: Resistance/Reps Notes/Cues                            Therapeutic Activities (42670) Min:                              Neuromuscular Re-ed (21577) Min: 40     Positional Testing Reassessment x10 mins    Canalith Repositioning Procedure For R posterior canalithiasis  2x - epley maneuver  For L posterior canalithiasis  1x - epley maneuver 8/4: very mild nystagmus noted after 1st CRP    Pt required 5 min rest breaks between maneuvers due to intense dizzines. Manual Intervention (79290) Min:                Modalities  Min:                      Other Therapeutic Activities: Pt was educated on PT POC, Diagnosis, Prognosis, pathomechanics as well as frequency and duration of scheduling future physical therapy appointments. Time was also taken on this day to answer all patient questions and participation in PT. Reviewed appointment policy in detail with patient and patient verbalized understanding. Home Exercise Program: Patient was instructed to avoid quick head movements and excessive bending over for the next 24 hours. Patient verbalized understanding and was issued written handout. 8/4: Pt and daughter instructed on how to perform B epley maneuvers at home for symptoms. Patient verbalized/demonstrated understanding and was issued written handout.     Therapeutic Exercise and NMR EXR  [] (30990) Provided verbal/tactile cueing for activities related to strengthening, flexibility, endurance, ROM for improvements in LE, proximal hip, and core control with self care, mobility, lifting, ambulation. [x] (65991) Provided verbal/tactile cueing for activities related to improving balance, coordination, kinesthetic sense, posture, motor skill, proprioception  to assist with LE, proximal hip, and core control in self care, mobility, lifting, ambulation and eccentric single leg control.  [address] and Therapeutic Activities:    [] (03935 or 35228) Provided verbal/tactile cueing for activities related to improving balance, coordination, kinesthetic sense, posture, motor skill, proprioception and motor activation to allow for proper function of core, proximal hip and LE with self care and ADLs and functional mobility.   [] (32554) Gait Re-education- Provided training and instruction to the patient for proper LE, core and proximal hip recruitment and positioning and eccentric body weight control with ambulation re-education including up and down stairs     Home Exercise Program:    [] (66625) Reviewed/Progressed HEP activities related to strengthening, flexibility, endurance, ROM of core, proximal hip and LE for functional self-care, mobility, lifting and ambulation/stair navigation   [x] (12340)Reviewed/Progressed HEP activities related to improving balance, coordination, kinesthetic sense, posture, motor skill, proprioception of core, proximal hip and LE for self care, mobility, lifting, and ambulation/stair navigation      Manual Treatments:  PROM / STM / Oscillations-Mobs:  G-I, II, III, IV (PA's, Inf., Post.)  [] (06880) Provided manual therapy to mobilize LE, proximal hip and/or LS spine soft tissue/joints for the purpose of modulating pain, promoting relaxation,  increasing ROM, reducing/eliminating soft tissue swelling/inflammation/restriction, improving soft tissue extensibility and allowing for proper ROM for normal function with self care, mobility, lifting and ambulation. Charges:  Timed Code Treatment Minutes: 40   Total Treatment Minutes: 40      [] EVAL (LOW) 85524 (typically 20 minutes face-to-face)  [] EVAL (MOD) 62687 (typically 30 minutes face-to-face)  [] EVAL (HIGH) 93364 (typically 45 minutes face-to-face)  [] RE-EVAL     [] LL(79733) x     [] Dry needle 1 or 2 Muscles (78510)  [x] NMR (92713) x 3    [] Dry needle 3+ Muscles (31379)  [] Manual (54527) x     [] Ultrasound (86412) x  [x] TA (47461) x     [] Mech Traction (44560)  [] ES(attended) (02596)     [] ES (un) (74460):   [] Vasopump (73718) [] Ionto (76892)   [] Other:    GOALS:  Patient stated goal: \"Get rid of my dizziness\"  []? Progressing: []? Met: []? Not Met: []? Adjusted     Therapist goals for Patient:   Short Term Goals: To be achieved in: 2 weeks  1. Independent in HEP and progression per patient tolerance, in order to prevent re-injury. []? Progressing: []? Met: []? Not Met: []? Adjusted  2. Patient will have a decrease in dizziness/imbalance/symptoms by 40% to facilitate improvement in movement, function, balance and ADLs as indicated by Functional Deficits. []? Progressing: []? Met: []? Not Met: []? Adjusted     Long Term Goals: To be achieved in: at discharge  1. Disability index score of 25% or less for the [facility] to assist with reaching prior level of function. []? Progressing: []? Met: []? Not Met: []? Adjusted  2. Patient will demonstrate negative oculomotor special testing/positional testing as indicated by patients Functional Deficits. []? Progressing: []? Met: []? Not Met: []? Adjusted  3. Patient will return to functional activities including rolling in bed and transfers without increased symptoms or restriction. []? Progressing: []? Met: []? Not Met: []? Adjusted  4. Patient will return to driving without symptoms or restriction  []? Progressing: []? Met: []? Not Met: []?  Adjusted         ASSESSMENT:  Patient demonstrates (+) B posterior canalithiasis upon positional testing. Able to perform B epley maneuvers with a reduction in nystagmus after maneuvers. Patient did require 5 min rest breaks following maneuvers due to intense dizziness. Treatment/Activity Tolerance:  [x] Patient tolerated treatment well [] Patient limited by fatique  [] Patient limited by pain  [] Patient limited by other medical complications  [] Other:     Overall Progression Towards Functional goals/ Treatment Progress Update:  [] Patient is progressing as expected towards functional goals listed. [] Progression is slowed due to complexities/Impairments listed. [] Progression has been slowed due to co-morbidities. [x] Plan just implemented, too soon to assess goals progression <30days   [] Goals require adjustment due to lack of progress  [] Patient is not progressing as expected and requires additional follow up with physician  [] Other    Prognosis for POC: [x] Good [] Fair  [] Poor    Patient requires continued skilled intervention: [x] Yes  [] No      PLAN: Reassess positional testing. [x] Continue per plan of care [] Alter current plan (see comments)  [] Plan of care initiated [] Hold pending MD visit [] Discharge    Electronically signed by: Piotr Anaya PT , OMT-C,  [id number]      Note: If patient does not return for scheduled/recommended follow up visits, this note will serve as a discharge from care along with the most recent update on progress.

## 2021-08-10 ENCOUNTER — APPOINTMENT (OUTPATIENT)
Dept: PHYSICAL THERAPY | Age: 81
End: 2021-08-10
Payer: MEDICARE

## 2021-08-12 ENCOUNTER — HOSPITAL ENCOUNTER (OUTPATIENT)
Dept: PHYSICAL THERAPY | Age: 81
Setting detail: THERAPIES SERIES
Discharge: HOME | End: 2021-08-12
Payer: MEDICARE

## 2021-08-12 ENCOUNTER — APPOINTMENT (OUTPATIENT)
Dept: PHYSICAL THERAPY | Age: 81
End: 2021-08-12
Payer: MEDICARE

## 2021-08-12 PROCEDURE — 97112 NEUROMUSCULAR REEDUCATION: CPT

## 2021-08-12 NOTE — FLOWSHEET NOTE
[facility], [facility]  [address] [facility]  Phone: [phone]   Fax:     [phone]      Physical Therapy Treatment Note/ Progress Report:   Date:  2021    Patient Name:  Rosmery Elizondo    :  1940  MRN: [id number]    Pertinent Medical History:  Sarcodosis, OA    Medical/Treatment Diagnosis Information:  · Diagnosis: Vertigo  · Treatment Diagnosis: BPPV, vestibular impairment    Insurance/Certification information:  PT Insurance Information: [facility] Medicare (requires Gin Tavarez)  Physician Information:  Referring Practitioner: Misa Cotton MD  Plan of care signed (Y/N): []  Yes [x]  No     Date of Patient follow up with Physician:      Progress Report: []  Yes  [x]  No     Date Range for reporting period:  Beginnin2021  Ending:     Progress report due (10 Rx/or 30 days whichever is less):      Recertification due (POC duration/ or 90 days whichever is less):  21    Visit # Insurance Allowable Auth required? Date Range   3 1 [x]  Yes []  No      Latex Allergy:  [x]NO      []YES  Preferred Language for Healthcare:   [x]English       []other:    Functional Outcomes Measure:   Date Assessed: at eval  Test: [facility]  Score: 24    Pain level:  3/10 neck, LB  Dizziness Level: 1/10     History of Injury:On , patient states she felt lightheaded all day. In late morning, when she stood in front of her chair after walking for the bathroom she got intense spinning sensation with dizziness that caused her to fall back into her chair. Notes she had this in the past due to too much calcium in . Family took patient to the ER due to patient was not able to even sit up without becoming dizzy. At ER, CT scan and MRI were performed and came back negative. Patient was then referred to the ENT. Notes she got dizziness in the ENT office during his testing.   Patient notes she has not had any other episodes of dizziness but feels like her balance is off. SUBJECTIVE:  8/4: Patient notes that the night after her 1st visit she felt really good. Yesterday though when she woke up her dizziness was extremely bad. Had difficulty moving around. Family stayed with her yesterday because she felt so bad. Notes being a little unsteady today but not dizzy. 8/12: has been doing Epley both sides just about every day; dizziness has been less since Sunday and hasn't had the severe spells where she feels like she will pass out     OBJECTIVE:   Oculomotor/Vestibular Examination:     Spontaneous nystagmus:       []? Left             []? Right           [x]? Absent  Gaze-Evoked nystagmus with fixation present:              Primary            []? Present       [x]? Absent              Right                []? Present       [x]? Absent              Left                  []? Present       [x]? Absent  VOR Head Thrust Test:          []? Normal       [x]? Abnormal    Comments: (+) with fast beat to the left  VOR Cancellation:                  [x]? Normal       []? Abnormal    Comments:   Smooth Pursuit:                      [x]? Normal       []? Abnormal    Comments:   Saccades:                               [x]? Normal       []? Abnormal    Comments:   Convergence:                          [x]? Normal       []? Abnormal    Comments:      Positional Testing - Updated 8/12/21  R Hallpike-Yuliya maneuver:               Nystagmus:     [x]? Yes             []? No               [x]? Duration: 10 secs                                         [x]? Direction: upbeating nystagmus to the right;               Vertigo:            [x]? Yes             []? No               [x]? Duration: 10 secs  L Hallpike-[place] maneuver:NT 8/12              Nystagmus:     [x]? Yes             []? No               [x]? Duration:                                           [x]? Direction:                   Vertigo:            [x]?  Yes             []? No [x]? Duration:   Supine roll head right:updated 8/12              Nystagmus:     []? Yes             [x]? No               []? Duration:                                          []? Direction:                  Vertigo:            []? Yes             [x]? No               []? Duration:   Supine roll head left:updated 8/12              Nystagmus:     []? Yes             [x]? No               []? Duration:                                          []? Direction:                  Vertigo:            []? Yes             [x]? No               []? Duration:     RESTRICTIONS/PRECAUTIONS: none    Exercises/Interventions:     Therapeutic Exercises (90021) Min: Resistance/Reps Notes/Cues                            Therapeutic Activities (79070) Min:                              Neuromuscular Re-ed (20275) Min: 40     Positional Testing Reassessment x10 mins    Canalith Repositioning Procedure For R posterior canalithiasis  *Liberatory maneuver x 1 for R post canalithiasis with A from PT and pt's daugher -pt had significant difficulty changing positions quickly     *Epley maneuver x 1      For L posterior canalithiasis  1x - epley maneuver 8/12:    *mild 5 sec nystagmus noted after 1st CRP (liberatory)  * after Epley pt had x 10-15 sec of downbeating nystagmus  * B horizontal canals tested negative   *only 1 liberatory and 1 Epley performed 8/12 for the R side only due to pt's poor tolerance to CRP techniques     Pt required 5 min rest breaks between maneuvers due to  dizzines. Manual Intervention (56137) Min:                Modalities  Min:                      Other Therapeutic Activities: Pt was educated on PT POC, Diagnosis, Prognosis, pathomechanics as well as frequency and duration of scheduling future physical therapy appointments. Time was also taken on this day to answer all patient questions and participation in PT.  Reviewed appointment policy in detail with patient and patient verbalized understanding. Home Exercise Program: Patient was instructed to avoid quick head movements and excessive bending over for the next 24 hours. Patient verbalized understanding and was issued written handout. 8/4: Pt and daughter instructed on how to perform B epley maneuvers at home for symptoms. Patient verbalized/demonstrated understanding and was issued written handout. Therapeutic Exercise and NMR EXR  [] (24852) Provided verbal/tactile cueing for activities related to strengthening, flexibility, endurance, ROM for improvements in LE, proximal hip, and core control with self care, mobility, lifting, ambulation. [x] (08901) Provided verbal/tactile cueing for activities related to improving balance, coordination, kinesthetic sense, posture, motor skill, proprioception  to assist with LE, proximal hip, and core control in self care, mobility, lifting, ambulation and eccentric single leg control.  [address] and Therapeutic Activities:    [] (87165 or 62032) Provided verbal/tactile cueing for activities related to improving balance, coordination, kinesthetic sense, posture, motor skill, proprioception and motor activation to allow for proper function of core, proximal hip and LE with self care and ADLs and functional mobility.   [] (97900) Gait Re-education- Provided training and instruction to the patient for proper LE, core and proximal hip recruitment and positioning and eccentric body weight control with ambulation re-education including up and down stairs     Home Exercise Program:    [] (91657) Reviewed/Progressed HEP activities related to strengthening, flexibility, endurance, ROM of core, proximal hip and LE for functional self-care, mobility, lifting and ambulation/stair navigation   [x] (44181)Reviewed/Progressed HEP activities related to improving balance, coordination, kinesthetic sense, posture, motor skill, proprioception of core, proximal hip and LE for self care, mobility, lifting, and ambulation/stair navigation      Manual Treatments:  PROM / STM / Oscillations-Mobs:  G-I, II, III, IV (PA's, Inf., Post.)  [] (48749) Provided manual therapy to mobilize LE, proximal hip and/or LS spine soft tissue/joints for the purpose of modulating pain, promoting relaxation,  increasing ROM, reducing/eliminating soft tissue swelling/inflammation/restriction, improving soft tissue extensibility and allowing for proper ROM for normal function with self care, mobility, lifting and ambulation. Charges:  Timed Code Treatment Minutes: 40   Total Treatment Minutes: 40      [] EVAL (LOW) 22709 (typically 20 minutes face-to-face)  [] EVAL (MOD) 15346 (typically 30 minutes face-to-face)  [] EVAL (HIGH) 65874 (typically 45 minutes face-to-face)  [] RE-EVAL     [] DP(13199) x     [] Dry needle 1 or 2 Muscles (91751)  [x] NMR (73360) x 3    [] Dry needle 3+ Muscles (04693)  [] Manual (90197) x     [] Ultrasound (88956) x  [] TA (19252) x     [] Mech Traction (79391)  [] ES(attended) (14254)     [] ES (un) (31232):   [] Vasopump (36046) [] Ionto (01978)   [] Other:    GOALS:  Patient stated goal: \"Get rid of my dizziness\"  []? Progressing: []? Met: []? Not Met: []? Adjusted     Therapist goals for Patient:   Short Term Goals: To be achieved in: 2 weeks  1. Independent in HEP and progression per patient tolerance, in order to prevent re-injury. []? Progressing: []? Met: []? Not Met: []? Adjusted  2. Patient will have a decrease in dizziness/imbalance/symptoms by 40% to facilitate improvement in movement, function, balance and ADLs as indicated by Functional Deficits. []? Progressing: []? Met: []? Not Met: []? Adjusted     Long Term Goals: To be achieved in: at discharge  1. Disability index score of 25% or less for the [facility] to assist with reaching prior level of function. []? Progressing: []? Met: []? Not Met: []? Adjusted  2.  Patient will demonstrate negative oculomotor special testing/positional testing as indicated by patients Functional Deficits. []? Progressing: []? Met: []? Not Met: []? Adjusted  3. Patient will return to functional activities including rolling in bed and transfers without increased symptoms or restriction. []? Progressing: []? Met: []? Not Met: []? Adjusted  4. Patient will return to driving without symptoms or restriction  []? Progressing: []? Met: []? Not Met: []? Adjusted         ASSESSMENT:  Patient demonstrates (+) B posterior canalithiasis upon positional testing. Able to perform B epley maneuvers with a reduction in nystagmus after maneuvers. Patient did require 5 min rest breaks following maneuvers due to intense dizziness. Treatment/Activity Tolerance:  [x] Patient tolerated treatment well [] Patient limited by fatique  [] Patient limited by pain  [] Patient limited by other medical complications  [] Other:     Overall Progression Towards Functional goals/ Treatment Progress Update:  [] Patient is progressing as expected towards functional goals listed. [] Progression is slowed due to complexities/Impairments listed. [] Progression has been slowed due to co-morbidities. [x] Plan just implemented, too soon to assess goals progression <30days   [] Goals require adjustment due to lack of progress  [] Patient is not progressing as expected and requires additional follow up with physician  [] Other    Prognosis for POC: [x] Good [] Fair  [] Poor    Patient requires continued skilled intervention: [x] Yes  [] No      PLAN: Reassess positional testing. [x] Continue per plan of care [] Alter current plan (see comments)  [] Plan of care initiated [] Hold pending MD visit [] Discharge    Electronically signed by: Julianne Thomson, PT , [id number]      Note: If patient does not return for scheduled/recommended follow up visits, this note will serve as a discharge from care along with the most recent update on progress.

## 2021-08-18 ENCOUNTER — HOSPITAL ENCOUNTER (OUTPATIENT)
Dept: PHYSICAL THERAPY | Age: 81
Setting detail: THERAPIES SERIES
Discharge: HOME | End: 2021-08-18
Payer: MEDICARE

## 2021-08-18 PROCEDURE — 97112 NEUROMUSCULAR REEDUCATION: CPT

## 2021-08-18 NOTE — FLOWSHEET NOTE
[facility], [facility]  [address] [facility] [place], [facility]  Phone: [phone]   Fax:     [phone]      Physical Therapy Treatment Note/ Progress Report:   Date:  2021    Patient Name:  Carito Varela    :  1940  MRN: [id number]    Pertinent Medical History:  Sarcodosis, OA    Medical/Treatment Diagnosis Information:  · Diagnosis: Vertigo  · Treatment Diagnosis: BPPV, vestibular impairment    Insurance/Certification information:  PT Insurance Information: BCBS Medicare (requires Danna Carney)  Physician Information:  Referring Practitioner: Jabier Pool MD  Plan of care signed (Y/N): []  Yes [x]  No     Date of Patient follow up with Physician:      Progress Report: []  Yes  [x]  No     Date Range for reporting period:  Beginnin2021  Ending:     Progress report due (10 Rx/or 30 days whichever is less):  32    Recertification due (POC duration/ or 90 days whichever is less):  21    Visit # Insurance Allowable Auth required? Date Range    1 +6 visits = 7 total [x]  Yes []  No Ends 21     Latex Allergy:  [x]NO      []YES  Preferred Language for Healthcare:   [x]English       []other:    Functional Outcomes Measure:   Date Assessed: at eval  Test: [facility]  Score: 24    Pain level:  3/10 neck, LB  Dizziness Level: 5/10     History of Injury:On , patient states she felt lightheaded all day. In late morning, when she stood in front of her chair after walking for the bathroom she got intense spinning sensation with dizziness that caused her to fall back into her chair. Notes she had this in the past due to too much calcium in . Family took patient to the ER due to patient was not able to even sit up without becoming dizzy. At ER, CT scan and MRI were performed and came back negative. Patient was then referred to the ENT.   Notes she got dizziness in the ENT office during his of L epley maneuver              Vertigo:            [x]? Yes             []? No               [x]? Duration: 10 secs  L Hallpike-Yuliya maneuver:              Nystagmus:     [x]? Yes             []? No               [x]? Duration: 1 mins                                          [x]? Direction: upbeating to the left              Vertigo:            [x]? Yes             []? No               [x]? Duration: 15 secs  Supine roll head right:              Nystagmus:     []? Yes             [x]? No               []? Duration:                                          []? Direction:                  Vertigo:            []? Yes             [x]? No               []? Duration:   Supine roll head left:              Nystagmus:     []? Yes             [x]? No               []? Duration:                                          []? Direction:                  Vertigo:            []? Yes             [x]? No               []? Duration:     RESTRICTIONS/PRECAUTIONS: none    Exercises/Interventions:     Therapeutic Exercises (74105) Min: Resistance/Reps Notes/Cues                            Therapeutic Activities (22159) Min:                              Neuromuscular Re-ed (14551) Min: 40     Positional Testing Reassessment x20 mins Tested L side first and (+) for posterior canalithiasis. Treated L side with 2 epley maneuvers. After treating L side with 2 epleys maneuvers R side was (-) during Yuliya-Hallpike    Canalith Repositioning Procedure For L posterior canalithiasis  *Epley maneuver performed 3x     Pt required rest breaks during maneuver due to c/o dizziness. 8/18: During 2nd stage of epley maneuver on 1st 2 epleys pt demonstrated upbeating nystagmus to the right when turning head to the right. 3rd epley performed and (-) nystagmus noted during this stage. Pt continued to demonstrate (+) L Yuliya-Hallpike despite CRP.                 Manual Intervention (85667) Min:                Modalities  Min:                      Other Therapeutic Activities: Pt was educated on PT POC, Diagnosis, Prognosis, pathomechanics as well as frequency and duration of scheduling future physical therapy appointments. Time was also taken on this day to answer all patient questions and participation in PT. Reviewed appointment policy in detail with patient and patient verbalized understanding. Home Exercise Program: Patient was instructed to avoid quick head movements and excessive bending over for the next 24 hours. Patient verbalized understanding and was issued written handout. 8/4: Pt and daughter instructed on how to perform B epley maneuvers at home for symptoms. Patient verbalized/demonstrated understanding and was issued written handout. Therapeutic Exercise and NMR EXR  [] (72915) Provided verbal/tactile cueing for activities related to strengthening, flexibility, endurance, ROM for improvements in LE, proximal hip, and core control with self care, mobility, lifting, ambulation. [x] (16114) Provided verbal/tactile cueing for activities related to improving balance, coordination, kinesthetic sense, posture, motor skill, proprioception  to assist with LE, proximal hip, and core control in self care, mobility, lifting, ambulation and eccentric single leg control.  [address] and Therapeutic Activities:    [] (35840 or 99474) Provided verbal/tactile cueing for activities related to improving balance, coordination, kinesthetic sense, posture, motor skill, proprioception and motor activation to allow for proper function of core, proximal hip and LE with self care and ADLs and functional mobility.   [] (73150) Gait Re-education- Provided training and instruction to the patient for proper LE, core and proximal hip recruitment and positioning and eccentric body weight control with ambulation re-education including up and down stairs     Home Exercise Program:    [] (81649) Reviewed/Progressed HEP activities related to strengthening, flexibility, endurance, ROM of core, proximal hip and LE for functional self-care, mobility, lifting and ambulation/stair navigation   [x] (07401)Reviewed/Progressed HEP activities related to improving balance, coordination, kinesthetic sense, posture, motor skill, proprioception of core, proximal hip and LE for self care, mobility, lifting, and ambulation/stair navigation      Manual Treatments:  PROM / STM / Oscillations-Mobs:  G-I, II, III, IV (PA's, Inf., Post.)  [] (26803) Provided manual therapy to mobilize LE, proximal hip and/or LS spine soft tissue/joints for the purpose of modulating pain, promoting relaxation,  increasing ROM, reducing/eliminating soft tissue swelling/inflammation/restriction, improving soft tissue extensibility and allowing for proper ROM for normal function with self care, mobility, lifting and ambulation. Charges:  Timed Code Treatment Minutes: 40   Total Treatment Minutes: 40      [] EVAL (LOW) 89332 (typically 20 minutes face-to-face)  [] EVAL (MOD) 52754 (typically 30 minutes face-to-face)  [] EVAL (HIGH) 21060 (typically 45 minutes face-to-face)  [] RE-EVAL     [] SA(78455) x     [] Dry needle 1 or 2 Muscles (09493)  [x] NMR (25370) x 3    [] Dry needle 3+ Muscles (05787)  [] Manual (44157) x     [] Ultrasound (44959) x  [] TA (95394) x     [] Mech Traction (64223)  [] ES(attended) (25139)     [] ES (un) (76373):   [] Vasopump (78077) [] Ionto (80827)   [] Other:    GOALS:  Patient stated goal: \"Get rid of my dizziness\"  []? Progressing: []? Met: []? Not Met: []? Adjusted     Therapist goals for Patient:   Short Term Goals: To be achieved in: 2 weeks  1. Independent in HEP and progression per patient tolerance, in order to prevent re-injury. []? Progressing: []? Met: []? Not Met: []? Adjusted  2. Patient will have a decrease in dizziness/imbalance/symptoms by 40% to facilitate improvement in movement, function, balance and ADLs as indicated by Functional Deficits. []?  Progressing: []? Met: []? Not Met: []? Adjusted     Long Term Goals: To be achieved in: at discharge  1. Disability index score of 25% or less for the [facility] to assist with reaching prior level of function. []? Progressing: []? Met: []? Not Met: []? Adjusted  2. Patient will demonstrate negative oculomotor special testing/positional testing as indicated by patients Functional Deficits. []? Progressing: []? Met: []? Not Met: []? Adjusted  3. Patient will return to functional activities including rolling in bed and transfers without increased symptoms or restriction. []? Progressing: []? Met: []? Not Met: []? Adjusted  4. Patient will return to driving without symptoms or restriction  []? Progressing: []? Met: []? Not Met: []? Adjusted         ASSESSMENT:  Patient demonstrates (+) B posterior canalithiasis upon positional testing. After performing 3 epley maneuvers for L side, pt demonstrated continued nystagmus for L side. However, pt demonstrated a complete reduction in nystagmus with R sided testing after maneuvers indicative of possible R side anterior canalithiasis. Patient did require 5 min rest breaks following maneuvers due to intense dizziness. Treatment/Activity Tolerance:  [x] Patient tolerated treatment well [] Patient limited by fatique  [] Patient limited by pain  [] Patient limited by other medical complications  [] Other:     Overall Progression Towards Functional goals/ Treatment Progress Update:  [] Patient is progressing as expected towards functional goals listed. [] Progression is slowed due to complexities/Impairments listed. [] Progression has been slowed due to co-morbidities.   [x] Plan just implemented, too soon to assess goals progression <30days   [] Goals require adjustment due to lack of progress  [] Patient is not progressing as expected and requires additional follow up with physician  [] Other    Prognosis for POC: [x] Good [] Fair  [] Poor    Patient requires continued skilled intervention: [x] Yes  [] No      PLAN: Reassess positional testing. [x] Continue per plan of care [] Alter current plan (see comments)  [] Plan of care initiated [] Hold pending MD visit [] Discharge    Electronically signed by: Rafal Taylor, PT , OMT-C,  [id number]        Note: If patient does not return for scheduled/recommended follow up visits, this note will serve as a discharge from care along with the most recent update on progress.

## 2021-08-23 ENCOUNTER — HOSPITAL ENCOUNTER (OUTPATIENT)
Dept: PHYSICAL THERAPY | Age: 81
Setting detail: THERAPIES SERIES
Discharge: HOME | End: 2021-08-23
Payer: MEDICARE

## 2021-08-23 PROCEDURE — 97530 THERAPEUTIC ACTIVITIES: CPT

## 2021-08-23 PROCEDURE — 97112 NEUROMUSCULAR REEDUCATION: CPT

## 2021-08-23 NOTE — FLOWSHEET NOTE
[facility], [facility]  [address] [facility], [facility]  Phone: [phone]   Fax:     [phone]      Physical Therapy Treatment Note/ Progress Report:   Date:  2021    Patient Name:  [place] Buchanan    :  1940  MRN: [id number]    Pertinent Medical History:  Sarcodosis, OA    Medical/Treatment Diagnosis Information:  · Diagnosis: Vertigo  · Treatment Diagnosis: BPPV, vestibular impairment    Insurance/Certification information:  PT Insurance Information: BCBS Medicare (requires Lawernce Chess)  Physician Information:  Referring Practitioner: Stephanie Aparicio MD  Plan of care signed (Y/N): []  Yes [x]  No     Date of Patient follow up with Physician:      Progress Report: []  Yes  [x]  No     Date Range for reporting period:  Beginnin2021  Ending:     Progress report due (10 Rx/or 30 days whichever is less):  94    Recertification due (POC duration/ or 90 days whichever is less):  21    Visit # Insurance Allowable Auth required? Date Range    1 +6 visits = 7 total [x]  Yes []  No Ends 21     Latex Allergy:  [x]NO      []YES  Preferred Language for Healthcare:   [x]English       []other:    Functional Outcomes Measure:   Date Assessed: at eval  Test: [address]  Score: 24    Pain level:  3/10 neck, LB  Dizziness Level: 5/10     History of Injury:On , patient states she felt lightheaded all day. In late morning, when she stood in front of her chair after walking for the bathroom she got intense spinning sensation with dizziness that caused her to fall back into her chair. Notes she had this in the past due to too much calcium in . Family took patient to the ER due to patient was not able to even sit up without becoming dizzy. At ER, CT scan and MRI were performed and came back negative. Patient was then referred to the ENT.   Notes she got dizziness in the ENT office during his testing. Patient notes she has not had any other episodes of dizziness but feels like her balance is off. SUBJECTIVE:  8/4: Patient notes that the night after her 1st visit she felt really good. Yesterday though when she woke up her dizziness was extremely bad. Had difficulty moving around. Family stayed with her yesterday because she felt so bad. Notes being a little unsteady today but not dizzy. 8/12: has been doing Epley both sides just about every day; dizziness has been less since Sunday and hasn't had the severe spells where she feels like she will pass out  8/18:  Patient notes the intensity and frequency of her dizzy spells is better. Patient's daughter reports doing Jenn Michelle throughout the week with the patient. Did maneuver 2 days ago. Patient states she went through the maneuver for R side without issues until she sat up.    8/23:  Patient notes she has been feeling pretty good since last visit. Patient reports no issues with dizziness and has been rolling over without issues. OBJECTIVE:   Oculomotor/Vestibular Examination:     Spontaneous nystagmus:       []? Left             []? Right           [x]? Absent  Gaze-Evoked nystagmus with fixation present:              Primary            []? Present       [x]? Absent              Right                []? Present       [x]? Absent              Left                  []? Present       [x]? Absent  VOR Head Thrust Test:          []? Normal       [x]? Abnormal    Comments: (+) with fast beat to the left  VOR Cancellation:                  [x]? Normal       []? Abnormal    Comments:   Smooth Pursuit:                      [x]? Normal       []? Abnormal    Comments:   Saccades:                               [x]? Normal       []? Abnormal    Comments:   Convergence:                          [x]? Normal       []? Abnormal    Comments:      Positional Testing - Updated 8/23/21  R Hallpike-[place] maneuver:               Nystagmus:     []?  Yes [x]? No               []? Duration:                                         []? Direction:               Vertigo:            []? Yes             [x]? No               []? Duration:   L Hallpike-Yuliya maneuver:              Nystagmus:     [x]? Yes             []? No               [x]? Duration: 1 mins                                          [x]? Direction: upbeating to the left              Vertigo:            []? Yes             [x]? No               []? Duration:   Supine roll head right:              Nystagmus:     []? Yes             [x]? No               []? Duration:                                          []? Direction:                  Vertigo:            []? Yes             [x]? No               []? Duration:   Supine roll head left:              Nystagmus:     []? Yes             [x]? No               []? Duration:                                          []? Direction:                  Vertigo:            []? Yes             [x]? No               []? Duration:     RESTRICTIONS/PRECAUTIONS: none    Exercises/Interventions:     Therapeutic Exercises (64556) Min: 5 Resistance/Reps Notes/Cues   Mini squats 10x    Stand HR 10x                   Therapeutic Activities (24069) Min: 10     NBOS:   *with eyes open  *with eyes closed  *with head turns and nods   x1 min  x1 min  x30 sec each     AirEx:  *NBOS with EO  *NBOS with EC   x1 min  x1 min    Tandem stance x30 sec each               Neuromuscular Re-ed (72004) Min: 30     Positional Testing Reassessment x20 mins Pt demonstrated nystagmus with L yuliya-arshad pike but no c/o dizziness   Canalith Repositioning Procedure For L posterior canalithiasis  *Epley maneuver performed 2x     *Liberatory maneuver performed 1x 8/23: Performed epley maneuver 1x. Upon retesting, pt still w/ L upbeating rotational nystagmus. Then tried L liberatory maneuver 1x with help of daughter. Upon retesting, pt still w/ upbeating nystagmus but no dizziness.   Tried epley maneuver one more time with no change in nystagmus noted. Manual Intervention (57695) Min:                Modalities  Min:                      Other Therapeutic Activities: Pt was educated on PT POC, Diagnosis, Prognosis, pathomechanics as well as frequency and duration of scheduling future physical therapy appointments. Time was also taken on this day to answer all patient questions and participation in PT. Reviewed appointment policy in detail with patient and patient verbalized understanding. Home Exercise Program: Patient was instructed to avoid quick head movements and excessive bending over for the next 24 hours. Patient verbalized understanding and was issued written handout. 8/4: Pt and daughter instructed on how to perform B epley maneuvers at home for symptoms. Patient verbalized/demonstrated understanding and was issued written handout. Therapeutic Exercise and NMR EXR  [] (30205) Provided verbal/tactile cueing for activities related to strengthening, flexibility, endurance, ROM for improvements in LE, proximal hip, and core control with self care, mobility, lifting, ambulation. [x] (82800) Provided verbal/tactile cueing for activities related to improving balance, coordination, kinesthetic sense, posture, motor skill, proprioception  to assist with LE, proximal hip, and core control in self care, mobility, lifting, ambulation and eccentric single leg control.  [address] and Therapeutic Activities:    [x] (82833 or 32512) Provided verbal/tactile cueing for activities related to improving balance, coordination, kinesthetic sense, posture, motor skill, proprioception and motor activation to allow for proper function of core, proximal hip and LE with self care and ADLs and functional mobility.   [] (39038) Gait Re-education- Provided training and instruction to the patient for proper LE, core and proximal hip recruitment and positioning and eccentric body weight control with ambulation re-education including up and down stairs     Home Exercise Program:    [] (13200) Reviewed/Progressed HEP activities related to strengthening, flexibility, endurance, ROM of core, proximal hip and LE for functional self-care, mobility, lifting and ambulation/stair navigation   [] (45302)Reviewed/Progressed HEP activities related to improving balance, coordination, kinesthetic sense, posture, motor skill, proprioception of core, proximal hip and LE for self care, mobility, lifting, and ambulation/stair navigation      Manual Treatments:  PROM / STM / Oscillations-Mobs:  G-I, II, III, IV (PA's, Inf., Post.)  [] (09056) Provided manual therapy to mobilize LE, proximal hip and/or LS spine soft tissue/joints for the purpose of modulating pain, promoting relaxation,  increasing ROM, reducing/eliminating soft tissue swelling/inflammation/restriction, improving soft tissue extensibility and allowing for proper ROM for normal function with self care, mobility, lifting and ambulation. Charges:  Timed Code Treatment Minutes: 45   Total Treatment Minutes: 45      [] EVAL (LOW) 31116 (typically 20 minutes face-to-face)  [] EVAL (MOD) 20969 (typically 30 minutes face-to-face)  [] EVAL (HIGH) 07116 (typically 45 minutes face-to-face)  [] RE-EVAL     [] YX(97734) x     [] Dry needle 1 or 2 Muscles (76202)  [x] NMR (95969) x 2    [] Dry needle 3+ Muscles (04366)  [] Manual (21379) x     [] Ultrasound (71232) x  [x] TA (85587) x 1    [] Mech Traction (29225)  [] ES(attended) (19256)     [] ES (un) (74611):   [] Vasopump (22831) [] Ionto (93238)   [] Other:    GOALS:  Patient stated goal: \"Get rid of my dizziness\"  []? Progressing: []? Met: []? Not Met: []? Adjusted     Therapist goals for Patient:   Short Term Goals: To be achieved in: 2 weeks  1. Independent in HEP and progression per patient tolerance, in order to prevent re-injury. []? Progressing: []? Met: []? Not Met: []? Adjusted  2.  Patient will have a decrease in dizziness/imbalance/symptoms by 40% to facilitate improvement in movement, function, balance and ADLs as indicated by Functional Deficits. []? Progressing: []? Met: []? Not Met: []? Adjusted     Long Term Goals: To be achieved in: at discharge  1. Disability index score of 25% or less for the [facility] to assist with reaching prior level of function. []? Progressing: []? Met: []? Not Met: []? Adjusted  2. Patient will demonstrate negative oculomotor special testing/positional testing as indicated by patients Functional Deficits. []? Progressing: []? Met: []? Not Met: []? Adjusted  3. Patient will return to functional activities including rolling in bed and transfers without increased symptoms or restriction. []? Progressing: []? Met: []? Not Met: []? Adjusted  4. Patient will return to driving without symptoms or restriction  []? Progressing: []? Met: []? Not Met: []? Adjusted         ASSESSMENT:  Patient demonstrated resolution of R sided BPPV but (+) posterior canalithiasis on L side still with positonal testing. .  After performing 3 CRPs  for L side, pt demonstrated continued nystagmus for L side but has no c/o dizziness. Initiated balance retraining which patient required CGA throughout. Patient will benefit from continued skilled care to return to normal functional activity level. Treatment/Activity Tolerance:  [x] Patient tolerated treatment well [] Patient limited by fatique  [] Patient limited by pain  [] Patient limited by other medical complications  [] Other:     Overall Progression Towards Functional goals/ Treatment Progress Update:  [] Patient is progressing as expected towards functional goals listed. [] Progression is slowed due to complexities/Impairments listed. [] Progression has been slowed due to co-morbidities.   [x] Plan just implemented, too soon to assess goals progression <30days   [] Goals require adjustment due to lack of progress  [] Patient is not progressing as expected and requires additional follow up with physician  [] Other    Prognosis for POC: [x] Good [] Fair  [] Poor    Patient requires continued skilled intervention: [x] Yes  [] No      PLAN: Reassess positional testing. [x] Continue per plan of care [] Alter current plan (see comments)  [] Plan of care initiated [] Hold pending MD visit [] Discharge    Electronically signed by: Fransico Mcconnell PT , OMT-C,  [id number]        Note: If patient does not return for scheduled/recommended follow up visits, this note will serve as a discharge from care along with the most recent update on progress.

## 2021-08-26 ENCOUNTER — HOSPITAL ENCOUNTER (OUTPATIENT)
Dept: PHYSICAL THERAPY | Age: 81
Setting detail: THERAPIES SERIES
Discharge: HOME | End: 2021-08-26
Payer: MEDICARE

## 2021-08-26 PROCEDURE — 97530 THERAPEUTIC ACTIVITIES: CPT

## 2021-08-26 PROCEDURE — 97112 NEUROMUSCULAR REEDUCATION: CPT

## 2021-08-26 NOTE — FLOWSHEET NOTE
[facility], [facility]  [address] [place] [facility], [facility]  Phone: [phone]   Fax:     [phone]      Physical Therapy Treatment Note/ Progress Report:   Date:  2021    Patient Name:  Kaylynn Zepeda    :  1940  MRN: [id number]    Pertinent Medical History:  Sarcodosis, OA    Medical/Treatment Diagnosis Information:  · Diagnosis: Vertigo  · Treatment Diagnosis: BPPV, vestibular impairment    Insurance/Certification information:  PT Insurance Information: [facility] Medicare (requires Geroldine [place])  Physician Information:  Referring Practitioner: Kendall Snyder MD  Plan of care signed (Y/N): []  Yes [x]  No     Date of Patient follow up with Physician:      Progress Report: []  Yes  [x]  No     Date Range for reporting period:  Beginnin2021  Ending:     Progress report due (10 Rx/or 30 days whichever is less):  02    Recertification due (POC duration/ or 90 days whichever is less):  21    Visit # Insurance Allowable Auth required? Date Range    1 +6 visits = 7 total [x]  Yes []  No Ends 21     Latex Allergy:  [x]NO      []YES  Preferred Language for Healthcare:   [x]English       []other:    Functional Outcomes Measure:   Date Assessed: at eval  Test: [facility]  Score: 24    Pain level:  3/10 neck, LB  Dizziness Level: 0/10     History of Injury:On , patient states she felt lightheaded all day. In late morning, when she stood in front of her chair after walking for the bathroom she got intense spinning sensation with dizziness that caused her to fall back into her chair. Notes she had this in the past due to too much calcium in . Family took patient to the ER due to patient was not able to even sit up without becoming dizzy. At ER, CT scan and MRI were performed and came back negative. Patient was then referred to the ENT.   Notes she got dizziness in the ENT office during his testing. Patient notes she has not had any other episodes of dizziness but feels like her balance is off. SUBJECTIVE:  8/4: Patient notes that the night after her 1st visit she felt really good. Yesterday though when she woke up her dizziness was extremely bad. Had difficulty moving around. Family stayed with her yesterday because she felt so bad. Notes being a little unsteady today but not dizzy. 8/12: has been doing Epley both sides just about every day; dizziness has been less since Sunday and hasn't had the severe spells where she feels like she will pass out  8/18:  Patient notes the intensity and frequency of her dizzy spells is better. Patient's daughter reports doing Margarite Mary throughout the week with the patient. Did maneuver 2 days ago. Patient states she went through the maneuver for R side without issues until she sat up.    8/23:  Patient notes she has been feeling pretty good since last visit. Patient reports no issues with dizziness and has been rolling over without issues. 8/26: felt good after last session; no dizzy spells for about a week and feels more energetic overall      OBJECTIVE:   Oculomotor/Vestibular Examination:     Spontaneous nystagmus:       []? Left             []? Right           [x]? Absent  Gaze-Evoked nystagmus with fixation present:              Primary            []? Present       [x]? Absent              Right                []? Present       [x]? Absent              Left                  []? Present       [x]? Absent  VOR Head Thrust Test:          []? Normal       [x]? Abnormal    Comments: (+) with fast beat to the left  VOR Cancellation:                  [x]? Normal       []? Abnormal    Comments:   Smooth Pursuit:                      [x]? Normal       []? Abnormal    Comments:   Saccades:                               [x]? Normal       []? Abnormal    Comments:   Convergence:                          [x]? Normal       []?  Abnormal    Comments: re-education including up and down stairs     Home Exercise Program:    [] (93193) Reviewed/Progressed HEP activities related to strengthening, flexibility, endurance, ROM of core, proximal hip and LE for functional self-care, mobility, lifting and ambulation/stair navigation   [] (07651)Reviewed/Progressed HEP activities related to improving balance, coordination, kinesthetic sense, posture, motor skill, proprioception of core, proximal hip and LE for self care, mobility, lifting, and ambulation/stair navigation      Manual Treatments:  PROM / STM / Oscillations-Mobs:  G-I, II, III, IV (PA's, Inf., Post.)  [] (92070) Provided manual therapy to mobilize LE, proximal hip and/or LS spine soft tissue/joints for the purpose of modulating pain, promoting relaxation,  increasing ROM, reducing/eliminating soft tissue swelling/inflammation/restriction, improving soft tissue extensibility and allowing for proper ROM for normal function with self care, mobility, lifting and ambulation. Charges:  Timed Code Treatment Minutes: 35   Total Treatment Minutes: 35      [] EVAL (LOW) 57810 (typically 20 minutes face-to-face)  [] EVAL (MOD) 89604 (typically 30 minutes face-to-face)  [] EVAL (HIGH) 46029 (typically 45 minutes face-to-face)  [] RE-EVAL     [] QC(26456) x     [] Dry needle 1 or 2 Muscles (20503)  [x] NMR (77002) x     [] Dry needle 3+ Muscles (14871)  [] Manual (70880) x     [] Ultrasound (61613) x  [x] TA (69152) x    [] Mech Traction (97951)  [] ES(attended) (14638)     [] ES (un) (45294):   [] Vasopump (39438) [] Ionto (03889)   [] Other:    GOALS:  Patient stated goal: \"Get rid of my dizziness\"  []? Progressing: []? Met: []? Not Met: []? Adjusted     Therapist goals for Patient:   Short Term Goals: To be achieved in: 2 weeks  1. Independent in HEP and progression per patient tolerance, in order to prevent re-injury. []? Progressing: []? Met: []? Not Met: []? Adjusted  2.  Patient will have a decrease in dizziness/imbalance/symptoms by 40% to facilitate improvement in movement, function, balance and ADLs as indicated by Functional Deficits. []? Progressing: []? Met: []? Not Met: []? Adjusted     Long Term Goals: To be achieved in: at discharge  1. Disability index score of 25% or less for the [address] to assist with reaching prior level of function. []? Progressing: []? Met: []? Not Met: []? Adjusted  2. Patient will demonstrate negative oculomotor special testing/positional testing as indicated by patients Functional Deficits. []? Progressing: []? Met: []? Not Met: []? Adjusted  3. Patient will return to functional activities including rolling in bed and transfers without increased symptoms or restriction. []? Progressing: []? Met: []? Not Met: []? Adjusted  4. Patient will return to driving without symptoms or restriction  []? Progressing: []? Met: []? Not Met: []? Adjusted         ASSESSMENT:  Patient demonstrated resolution of R sided BPPV but (+) posterior canalithiasis on L side still with positonal testing. .  After performing 3 CRPs  for L side, pt demonstrated continued nystagmus for L side but has no c/o dizziness. Initiated balance retraining which patient required CGA throughout. Patient will benefit from continued skilled care to return to normal functional activity level. Treatment/Activity Tolerance:  [x] Patient tolerated treatment well [] Patient limited by fatique  [] Patient limited by pain  [] Patient limited by other medical complications  [] Other:     Overall Progression Towards Functional goals/ Treatment Progress Update:  [] Patient is progressing as expected towards functional goals listed. [] Progression is slowed due to complexities/Impairments listed. [] Progression has been slowed due to co-morbidities.   [x] Plan just implemented, too soon to assess goals progression <30days   [] Goals require adjustment due to lack of progress  [] Patient is not progressing as expected and requires additional follow up with physician  [] Other    Prognosis for POC: [x] Good [] Fair  [] Poor    Patient requires continued skilled intervention: [x] Yes  [] No      PLAN: Reassess positional testing. [x] Continue per plan of care [] Alter current plan (see comments)  [] Plan of care initiated [] Hold pending MD visit [] Discharge    Electronically signed by: Sukhjinder Gill, PT , [id number]        Note: If patient does not return for scheduled/recommended follow up visits, this note will serve as a discharge from care along with the most recent update on progress.

## 2021-08-30 ENCOUNTER — HOSPITAL ENCOUNTER (OUTPATIENT)
Dept: PHYSICAL THERAPY | Age: 81
Setting detail: THERAPIES SERIES
Discharge: HOME | End: 2021-08-30
Payer: MEDICARE

## 2021-08-30 PROCEDURE — 97110 THERAPEUTIC EXERCISES: CPT

## 2021-08-30 PROCEDURE — 97112 NEUROMUSCULAR REEDUCATION: CPT

## 2021-08-30 PROCEDURE — 97530 THERAPEUTIC ACTIVITIES: CPT

## 2021-08-30 NOTE — PLAN OF CARE
[facility], [facility]  [address] [facility] [facility], [facility]  Phone: [phone]   Fax:     [phone]        Physical Therapy Re-Certification Plan of Erma Kitchen      Dear Dr. Serrano Pa,     We had the pleasure of treating the following patient for physical therapy services at [address]. A summary of our findings can be found in the updated assessment below. This includes our plan of care. If you have any questions or concerns regarding these findings, please do not hesitate to contact me at the office phone number checked above. Thank you for the referral.     Physician Signature:________________________________Date:__________________  By signing above (or electronic signature), therapists plan is approved by physician    Date Range Of Visits: 8/28/21 to 8/30/21  Total Visits to Date: 7  Overall Response to Treatment:   [x]Patient is responding well to treatment and improvement is noted with regards  to goals   [x]Patient should continue to improve in reasonable time if they continue HEP   []Patient has plateaued and is no longer responding to skilled PT intervention    []Patient is getting worse and would benefit from return to referring MD   []Patient unable to adhere to initial POC   []Other:     ASSESSMENT:  Patient demonstrated resolution of R sided BPPV but (+) posterior canalithiasis on L side still with positonal testing. .  After performing CRPs  for L side, pt demonstrated continued nystagmus for L side in THE [facility] position but has no c/o dizziness. Patient does not exhibit nystagmus in SL test position. Initiated balance retraining which patient required CGA throughout. Believe patient is ready to continue with HEP at this time x2 weeks. If s/s increase, we will resume PT as usual.  If s/s are under control with HEP, we will d/c with HEP.   Patient may benefit from VNG study if dizziness comes up again. Recommendation:    [x]Continue x2 weeks with HEP. If s/s increase, we will resume PT as usual.  If s/s are under control with HEP, we will d/c with HEP. Physical Therapy Treatment Note/ Progress Report:   Date:  2021    Patient Name:  Elvira Nichols    :  1940  MRN: [id number]    Pertinent Medical History:  Sarcodosis, OA    Medical/Treatment Diagnosis Information:  · Diagnosis: Vertigo  · Treatment Diagnosis: BPPV, vestibular impairment    Insurance/Certification information:  PT Insurance Information: [facility] Medicare (requires Jossy Cardona)  Physician Information:  Referring Practitioner: Aristides Vasquez MD  Plan of care signed (Y/N): [x]  Yes []  No     Date of Patient follow up with Physician:      Progress Report: [x]  Yes  []  No     Date Range for reporting period:  Beginnin2021  Endin21    Progress report due (10 Rx/or 30 days whichever is less):  00    Recertification due (POC duration/ or 90 days whichever is less):  21    Visit # Insurance Allowable Auth required? Date Range    1 +6 visits = 7 total [x]  Yes []  No Ends 21     Latex Allergy:  [x]NO      []YES  Preferred Language for Healthcare:   [x]English       []other:    Functional Outcomes Measure:   Date Assessed: 21  Test: [facility]  Score:20    Pain level:  3/10 neck, LB  Dizziness Level: 0/10     History of Injury:On , patient states she felt lightheaded all day. In late morning, when she stood in front of her chair after walking for the bathroom she got intense spinning sensation with dizziness that caused her to fall back into her chair. Notes she had this in the past due to too much calcium in . Family took patient to the ER due to patient was not able to even sit up without becoming dizzy. At ER, CT scan and MRI were performed and came back negative. Patient was then referred to the ENT.   Notes she got dizziness in the ENT office during his testing. Patient notes she has not had any other episodes of dizziness but feels like her balance is off. SUBJECTIVE:  8/4: Patient notes that the night after her 1st visit she felt really good. Yesterday though when she woke up her dizziness was extremely bad. Had difficulty moving around. Family stayed with her yesterday because she felt so bad. Notes being a little unsteady today but not dizzy. 8/12: has been doing Epley both sides just about every day; dizziness has been less since Sunday and hasn't had the severe spells where she feels like she will pass out  8/18:  Patient notes the intensity and frequency of her dizzy spells is better. Patient's daughter reports doing [employer]a Bridge throughout the week with the patient. Did maneuver 2 days ago. Patient states she went through the maneuver for R side without issues until she sat up.    8/23:  Patient notes she has been feeling pretty good since last visit. Patient reports no issues with dizziness and has been rolling over without issues. 8/26: felt good after last session; no dizzy spells for about a week and feels more energetic overall    8/30: Patient notes no c/o dizziness since last visit. Notes she is feeling better and has been doing more around the house. This morning she felt lightheaded but that has since past.  Patient reports 75% improvement overall with dizziness. Has returned to doing some short driving. Patient is able to roll over in bed and complete all transfers without issues. OBJECTIVE:   Oculomotor/Vestibular Examination:     Spontaneous nystagmus:       []? Left             []? Right           [x]? Absent  Gaze-Evoked nystagmus with fixation present:              Primary            []? Present       [x]? Absent              Right                []? Present       [x]? Absent              Left                  []? Present       [x]? Absent  VOR Head Thrust Test:          []? Normal       [x]?  Abnormal    Comments: (+) with stance x30 sec each     Step stance  4\" x 30 sec L/R          Neuromuscular Re-ed (10583) Min: 15     Positional Testing Reassessment x10 mins Pt demonstrated nystagmus with L marilyn-arshad pike but no c/o dizziness   Canalith Repositioning Procedure For L posterior canalithiasis  *Epley maneuver performed 1x      8/30: Performed epley maneuver 1x. Upon retesting, pt had (-) SL test             Manual Intervention (81419) Min:                Modalities  Min:                      Other Therapeutic Activities: Pt was educated on PT POC, Diagnosis, Prognosis, pathomechanics as well as frequency and duration of scheduling future physical therapy appointments. Time was also taken on this day to answer all patient questions and participation in PT. Reviewed appointment policy in detail with patient and patient verbalized understanding. Home Exercise Program: Patient was instructed to avoid quick head movements and excessive bending over for the next 24 hours. Patient verbalized understanding and was issued written handout. 8/4: Pt and daughter instructed on how to perform B epley maneuvers at home for symptoms. Patient verbalized/demonstrated understanding and was issued written handout. 8/30: Updated HEP to include: mini squats, stand HR, NBOS with EC/EO, NBOS with head turns/nods, and tandem stance. Patient verbalized/demonstrated understanding and was issued written handout. Therapeutic Exercise and NMR EXR  [] (73989) Provided verbal/tactile cueing for activities related to strengthening, flexibility, endurance, ROM for improvements in LE, proximal hip, and core control with self care, mobility, lifting, ambulation. [x] (38083) Provided verbal/tactile cueing for activities related to improving balance, coordination, kinesthetic sense, posture, motor skill, proprioception  to assist with LE, proximal hip, and core control in self care, mobility, lifting, ambulation and eccentric single leg control.  [address] TA (90216) x    [] [facility] Traction (37680)  [] ES(attended) (95198)     [] ES (un) (78665):   [] Vasopump (43824) [] Ionto (87892)   [] Other:    GOALS:  Patient stated goal: \"Get rid of my dizziness\"  []? Progressing: [x]? Met: []? Not Met: []? Adjusted     Therapist goals for Patient:   Short Term Goals: To be achieved in: 2 weeks  1. Independent in HEP and progression per patient tolerance, in order to prevent re-injury. []? Progressing: [x]? Met: []? Not Met: []? Adjusted  2. Patient will have a decrease in dizziness/imbalance/symptoms by 40% to facilitate improvement in movement, function, balance and ADLs as indicated by Functional Deficits. []? Progressing: [x]? Met: []? Not Met: []? Adjusted     Long Term Goals: To be achieved in: at discharge  1. Disability index score of 25% or less for the [facility] to assist with reaching prior level of function. []? Progressing: [x]? Met: []? Not Met: []? Adjusted  2. Patient will demonstrate negative oculomotor special testing/positional testing as indicated by patients Functional Deficits. [x]? Progressing: []? Met: []? Not Met: []? Adjusted  3. Patient will return to functional activities including rolling in bed and transfers without increased symptoms or restriction. []? Progressing: [x]? Met: []? Not Met: []? Adjusted  4. Patient will return to driving without symptoms or restriction  []? Progressing: [x]? Met: []? Not Met: []? Adjusted         ASSESSMENT:  Patient demonstrated resolution of R sided BPPV but (+) posterior canalithiasis on L side still with positonal testing. .  After performing CRPs  for L side, pt demonstrated continued nystagmus for L side in THE [facility] position but has no c/o dizziness. Patient does not exhibit nystagmus in SL test position. Initiated balance retraining which patient required CGA throughout. Believe patient is ready to continue with HEP at this time x2 weeks.   If s/s increase, we will resume PT as usual.  If s/s are under control with HEP, we will d/c with HEP. Patient may benefit from VNG study if dizziness comes up again. Treatment/Activity Tolerance:  [x] Patient tolerated treatment well [] Patient limited by fatique  [] Patient limited by pain  [] Patient limited by other medical complications  [] Other:     Overall Progression Towards Functional goals/ Treatment Progress Update:  [] Patient is progressing as expected towards functional goals listed. [] Progression is slowed due to complexities/Impairments listed. [] Progression has been slowed due to co-morbidities. [x] Plan just implemented, too soon to assess goals progression <30days   [] Goals require adjustment due to lack of progress  [] Patient is not progressing as expected and requires additional follow up with physician  [] Other    Prognosis for POC: [x] Good [] Fair  [] Poor    Patient requires continued skilled intervention: [] Yes  [x] No      PLAN: Continue x2 weeks with HEP. If s/s increase, we will resume PT as usual.  If s/s are under control with HEP, we will d/c with HEP. [] Continue per plan of care [x] Alter current plan (see comments)  [] Plan of care initiated [] Hold pending MD visit [] Discharge    Electronically signed by: Luis Velez PT , OMT-C,  [id number]          Note: If patient does not return for scheduled/recommended follow up visits, this note will serve as a discharge from care along with the most recent update on progress.

## 2021-09-15 ENCOUNTER — OFFICE VISIT (OUTPATIENT)
Dept: ENT CLINIC | Age: 81
End: 2021-09-15
Payer: MEDICARE

## 2021-09-15 VITALS — WEIGHT: 161 LBS | SYSTOLIC BLOOD PRESSURE: 141 MMHG | DIASTOLIC BLOOD PRESSURE: 83 MMHG | HEART RATE: 84 BPM

## 2021-09-15 DIAGNOSIS — H93.A1 PULSATILE TINNITUS OF RIGHT EAR: ICD-10-CM

## 2021-09-15 DIAGNOSIS — R42 DIZZINESS: Primary | ICD-10-CM

## 2021-09-15 DIAGNOSIS — H81.12 BENIGN PAROXYSMAL POSITIONAL VERTIGO OF LEFT EAR: ICD-10-CM

## 2021-09-15 DIAGNOSIS — J33.9 NASAL POLYP: ICD-10-CM

## 2021-09-15 PROCEDURE — 99213 OFFICE O/P EST LOW 20 MIN: CPT | Performed by: OTOLARYNGOLOGY

## 2021-09-15 NOTE — PROGRESS NOTES
[facility]  Follow up      Patient Name: [address] [place] [address] Record Number:  [id number]  Primary Care Physician:  Gilberto Santiago MD  Date of Consultation: 9/15/2021    Chief Complaint: Dizziness        Interval History  Patient is following up for her dizziness. I felt as though she likely had BPPV of the left ear in clinic in July 2021. I had her see physical therapy as it was difficult for me to do Epley's in clinic with her other issues. She said that after working with physical therapy for a while she is noticed dramatic improvement in her symptoms. She feels a lot better. She was also having pulsatile tinnitus which has resolved. She was having headaches as well which also resolved. There was an incidental benign-appearing nasal polyp on scan. She is not having any new nasal symptoms. REVIEW OF SYSTEMS  As above    PHYSICAL EXAM  GENERAL: No Acute Distress, Alert and Oriented, no Hoarseness, strong voice  EYES: EOMI, Anti-icteric  HENT:   Head: Normocephalic and atraumatic. Face:  Symmetric, facial nerve intact, no sinus tenderness  Right Ear: Normal external ear, normal external auditory canal, intact tympanic membrane with normal mobility and aerated middle ear  Left Ear: Normal external ear, normal external auditory canal, intact tympanic membrane with normal mobility and aerated middle ear  Mouth/Oral Cavity:  normal lips, Uvula is midline, no mucosal lesions  Oropharynx/Larynx:  normal oropharynx  Nose:Normal external nasal appearance. I reviewed PT notes        ASSESSMENT/PLAN  1. Dizziness  Patient has had a dramatic improvement in her dizziness. She should schedule a follow-up appointment if it recurs. 2. Pulsatile tinnitus of right ear  It is actually resolved as well. The patient says that she feels as though she is hearing well.   We were going to get a hearing test, but she does not feel as though it is

## 2022-02-28 ENCOUNTER — HOSPITAL ENCOUNTER (EMERGENCY)
Age: 82
Discharge: HOME | End: 2022-02-28
Attending: EMERGENCY MEDICINE
Payer: MEDICARE

## 2022-02-28 VITALS
RESPIRATION RATE: 16 BRPM | DIASTOLIC BLOOD PRESSURE: 74 MMHG | HEART RATE: 88 BPM | SYSTOLIC BLOOD PRESSURE: 130 MMHG | TEMPERATURE: 98 F | OXYGEN SATURATION: 96 %

## 2022-02-28 DIAGNOSIS — M79.89 SWELLING OF RIGHT LOWER EXTREMITY: Primary | ICD-10-CM

## 2022-02-28 LAB
A/G RATIO: 1.1 (ref 1.1–2.2)
ALBUMIN SERPL-MCNC: 4 G/DL (ref 3.4–5)
ALP BLD-CCNC: 92 U/L (ref 40–129)
ALT SERPL-CCNC: 6 U/L (ref 10–40)
ANION GAP SERPL CALCULATED.3IONS-SCNC: 12 MMOL/L (ref 3–16)
AST SERPL-CCNC: 21 U/L (ref 15–37)
BASOPHILS ABSOLUTE: 0.1 K/UL (ref 0–0.2)
BASOPHILS RELATIVE PERCENT: 0.6 %
BILIRUB SERPL-MCNC: 0.3 MG/DL (ref 0–1)
BUN BLDV-MCNC: 13 MG/DL (ref 7–20)
CALCIUM SERPL-MCNC: 9.7 MG/DL (ref 8.3–10.6)
CHLORIDE BLD-SCNC: 98 MMOL/L (ref 99–110)
CO2: 25 MMOL/L (ref 21–32)
CREAT SERPL-MCNC: 0.7 MG/DL (ref 0.6–1.2)
EOSINOPHILS ABSOLUTE: 0.3 K/UL (ref 0–0.6)
EOSINOPHILS RELATIVE PERCENT: 2.8 %
GFR AFRICAN AMERICAN: >60
GFR NON-AFRICAN AMERICAN: >60
GLUCOSE BLD-MCNC: 101 MG/DL (ref 70–99)
HCT VFR BLD CALC: 38.1 % (ref 36–48)
HEMOGLOBIN: 12.3 G/DL (ref 12–16)
LYMPHOCYTES ABSOLUTE: 1.1 K/UL (ref 1–5.1)
LYMPHOCYTES RELATIVE PERCENT: 11.1 %
MCH RBC QN AUTO: 28.5 PG (ref 26–34)
MCHC RBC AUTO-ENTMCNC: 32.2 G/DL (ref 31–36)
MCV RBC AUTO: 88.4 FL (ref 80–100)
MONOCYTES ABSOLUTE: 1.2 K/UL (ref 0–1.3)
MONOCYTES RELATIVE PERCENT: 11.5 %
NEUTROPHILS ABSOLUTE: 7.4 K/UL (ref 1.7–7.7)
NEUTROPHILS RELATIVE PERCENT: 74 %
PDW BLD-RTO: 14.9 % (ref 12.4–15.4)
PLATELET # BLD: 321 K/UL (ref 135–450)
PMV BLD AUTO: 7 FL (ref 5–10.5)
POTASSIUM REFLEX MAGNESIUM: 4.3 MMOL/L (ref 3.5–5.1)
RBC # BLD: 4.31 M/UL (ref 4–5.2)
SODIUM BLD-SCNC: 135 MMOL/L (ref 136–145)
TOTAL PROTEIN: 7.5 G/DL (ref 6.4–8.2)
WBC # BLD: 10.1 K/UL (ref 4–11)

## 2022-02-28 PROCEDURE — 99282 EMERGENCY DEPT VISIT SF MDM: CPT

## 2022-02-28 PROCEDURE — 85025 COMPLETE CBC W/AUTO DIFF WBC: CPT

## 2022-02-28 PROCEDURE — 93971 EXTREMITY STUDY: CPT

## 2022-02-28 PROCEDURE — 80053 COMPREHEN METABOLIC PANEL: CPT

## 2022-02-28 ASSESSMENT — PAIN DESCRIPTION - DESCRIPTORS: DESCRIPTORS: DISCOMFORT

## 2022-02-28 ASSESSMENT — ENCOUNTER SYMPTOMS
SORE THROAT: 0
VOMITING: 0
NAUSEA: 0
EYE PAIN: 0
BACK PAIN: 0
RHINORRHEA: 0
DIARRHEA: 0
ABDOMINAL PAIN: 0
COUGH: 0
CONSTIPATION: 0
SHORTNESS OF BREATH: 0

## 2022-02-28 ASSESSMENT — PAIN SCALES - GENERAL
PAINLEVEL_OUTOF10: 4
PAINLEVEL_OUTOF10: 6

## 2022-02-28 ASSESSMENT — PAIN DESCRIPTION - LOCATION: LOCATION: KNEE

## 2022-02-28 ASSESSMENT — PAIN - FUNCTIONAL ASSESSMENT: PAIN_FUNCTIONAL_ASSESSMENT: 0-10

## 2022-02-28 ASSESSMENT — PAIN DESCRIPTION - ORIENTATION: ORIENTATION: RIGHT

## 2022-02-28 ASSESSMENT — PAIN DESCRIPTION - PAIN TYPE: TYPE: ACUTE PAIN

## 2022-02-28 NOTE — ED PROVIDER NOTES
[address] [facility]        Pt Name: Cristina Leavitt  MRN: [id number]  [place] 1940  Date of evaluation: 2/28/2022  Provider: GIULIA Quinonez - CNP  PCP: Brad Hanley MD  Note Started: 11:29 AM EST       I have seen and evaluated this patient with my supervising physician Dr. Gisela Heredia       Chief Complaint   Patient presents with    Knee Pain     called pcp, sent for DVT r/o, right knee pain         HISTORY OF PRESENT ILLNESS   (Location/Symptom, Timing/Onset, Context/Setting, Quality, Duration, Modifying Factors, Severity)  Note limiting factors. Cristina Leavitt is a 78 y.o. female who presents to the ED reporting right knee swelling and pain to right lateral knee and right calf. She says has been ongoing for the last 2 weeks. She denies any trauma or injury to the affected extremity. She has had some good pain relief with acetaminophen but is here because the swelling is not getting better. She denies any smoking, hormone use, coagulopathy, personal history or family history of blood clots    Nursing Notes were all reviewed and agreed with or any disagreements were addressed in the HPI. REVIEW OF SYSTEMS    (2-9 systems for level 4, 10 or more for level 5)     Review of Systems   Constitutional: Negative for chills, diaphoresis and fever. HENT: Negative for congestion, rhinorrhea and sore throat. Eyes: Negative for pain and visual disturbance. Respiratory: Negative for cough and shortness of breath. Cardiovascular: Positive for leg swelling (Right side). Negative for chest pain. Gastrointestinal: Negative for abdominal pain, constipation, diarrhea, nausea and vomiting. Genitourinary: Negative for frequency and hematuria. Musculoskeletal: Negative for back pain and neck pain. Skin: Negative for rash and wound.    Neurological: Negative for dizziness and light-headedness. PAST MEDICAL HISTORY   No past medical history on file. SURGICAL HISTORY   No past surgical history on file. [address]       Discharge Medication List as of 2/28/2022 12:54 PM      CONTINUE these medications which have NOT CHANGED    Details   allopurinol (ZYLOPRIM) 100 MG tablet Take 1 tablet by mouth 2 times dailyHistorical Med      aspirin 81 MG chewable tablet Take 81 mg by mouth dailyHistorical Med      levothyroxine (SYNTHROID) 88 MCG tablet Take 1 tablet by mouth dailyHistorical Med             ALLERGIES     Sulfa antibiotics    FAMILYHISTORY     No family history on file. SOCIAL HISTORY       Social History     Socioeconomic History    Marital status:      Spouse name: Not on file    Number of children: Not on file    Years of education: Not on file    Highest education level: Not on file   Occupational History    Not on file   Tobacco Use    Smoking status: Never Smoker    Smokeless tobacco: Never Used   Substance and Sexual Activity    Alcohol use: Not on file    Drug use: Not on file    Sexual activity: Not on file   Other Topics Concern    Not on file   Social History Narrative    Not on file     Social Determinants of Health     Financial Resource Strain:     Difficulty of Paying Living Expenses: Not on file   Food Insecurity:     Worried About Running Out of Food in the Last Year: Not on file    Maida of Food in the Last Year: Not on file   Transportation Needs:     Lack of Transportation (Medical): Not on file    Lack of Transportation (Non-Medical):  Not on file   Physical Activity:     Days of Exercise per Week: Not on file    Minutes of Exercise per Session: Not on file   Stress:     Feeling of Stress : Not on file   Social Connections:     Frequency of Communication with Friends and Family: Not on file    Frequency of Social Gatherings with Friends and Family: Not on file    Attends Gnosticist Services: Not on file   Blak[place] Active Member of Clubs or Organizations: Not on file    Attends Club or Organization Meetings: Not on file    Marital Status: Not on file   Intimate Partner Violence:     Fear of Current or Ex-Partner: Not on file    Emotionally Abused: Not on file    Physically Abused: Not on file    Sexually Abused: Not on file   Housing Stability:     Unable to Pay for Housing in the Last Year: Not on file    Number of [place] in the Last Year: Not on file    Unstable Housing in the Last Year: Not on file       SCREENINGS             PHYSICAL EXAM    (up to 7 for level 4, 8 or more for level 5)     ED Triage Vitals [02/28/22 1046]   BP Temp Temp Source Pulse Resp SpO2 Height Weight   (!) 141/89 98 °F (36.7 °C) Temporal 95 16 95 % -- --       Physical Exam  Vitals and nursing note reviewed. Constitutional:       General: She is not in acute distress. Appearance: Normal appearance. She is normal weight. She is not ill-appearing, toxic-appearing or diaphoretic. HENT:      Head: Normocephalic and atraumatic. Right Ear: External ear normal.      Left Ear: External ear normal.      Nose: Nose normal. No congestion or rhinorrhea. Mouth/Throat:      Mouth: Mucous membranes are moist.      Pharynx: Oropharynx is clear. No oropharyngeal exudate or posterior oropharyngeal erythema. Eyes:      General: No scleral icterus. Right eye: No discharge. Left eye: No discharge. Extraocular Movements: Extraocular movements intact. Conjunctiva/sclera: Conjunctivae normal.      Pupils: Pupils are equal, round, and reactive to light. Cardiovascular:      Rate and Rhythm: Normal rate and regular rhythm. Pulses: Normal pulses. Heart sounds: Normal heart sounds. No murmur heard. No friction rub. No gallop. Pulmonary:      Effort: Pulmonary effort is normal. No respiratory distress. Breath sounds: Normal breath sounds. No stridor. No wheezing, rhonchi or rales.    Abdominal: General: Bowel sounds are normal. There is no distension. Palpations: Abdomen is soft. Tenderness: There is no abdominal tenderness. There is no guarding. Musculoskeletal:         General: Swelling present. Normal range of motion. Cervical back: Normal range of motion and neck supple. No rigidity. Right lower leg: Edema present. Left lower leg: Edema present. Comments: Slight edema to both lower extremities, this is normal for patient. Slightly more swelling on the right side. Slight pain on palpation right lateral knee. 1+ DPs and PTs bilaterally excellent capillary refill. No sensation change. No numbness or tingling  Walks with a cane baseline no change   Skin:     General: Skin is warm and dry. Capillary Refill: Capillary refill takes less than 2 seconds. Coloration: Skin is not jaundiced. Findings: No bruising or rash. Neurological:      General: No focal deficit present. Mental Status: She is alert and oriented to person, place, and time. Psychiatric:         Mood and Affect: Mood normal.         Behavior: Behavior normal.         DIAGNOSTIC RESULTS   LABS:    Labs Reviewed   COMPREHENSIVE METABOLIC PANEL W/ REFLEX TO MG FOR LOW K - Abnormal; Notable for the following components:       Result Value    Sodium 135 (*)     Chloride 98 (*)     Glucose 101 (*)     ALT 6 (*)     All other components within normal limits    Narrative:     Performed at:  [facility]  [address] [place], [place] [address]   Phone [phone]   CBC WITH AUTO DIFFERENTIAL    Narrative:     Performed at:  [facility]  [address] [place], [place] [address]   Phone [phone]       When ordered, only abnormal lab results are displayed. All other labs were within normal range or not returned as of this dictation. EKG:  When ordered, EKG's are interpreted by the Emergency Department Physician in the absence of a cardiologist.  Please see their note for interpretation of EKG. RADIOLOGY:   Non-plain film images such as CT, Ultrasound and MRI are read by the radiologist. Bernie Santa radiographic images are visualized andpreliminarily interpreted by the  ED Provider with the below findings:        Interpretation tigre Radiologist below, if available at the time of this note:    VL Extremity Venous Right   Final Result        No results found. PROCEDURES   Unless otherwise noted below, none     Procedures    CRITICAL CARE TIME   N/A    CONSULTS:  None      EMERGENCY DEPARTMENT COURSE and DIFFERENTIAL DIAGNOSIS/MDM:   Vitals:    Vitals:    02/28/22 1046 02/28/22 1300   BP: (!) 141/89 130/74   Pulse: 95 88   Resp: 16 16   Temp: 98 °F (36.7 °C)    TempSrc: Temporal    SpO2: 95% 96%       Patient was given thefollowing medications:  Medications - No data to display        23-year-old female presenting to the ED with right leg swelling ongoing for 2 weeks. Please see presentation HPI. Differential Diagnosis: CHF, hypoproteinemia, renal failure, peripheral vascular disease, systemic infection, cellulitis, compartment syndrome, DVT, other  CBC in the ED without any acute findings. CMP with mild hyponatremia 135, mild hypochloremia 98, glucose 101. LFTs within normal limits. Venous Doppler of the right lower extremity was ordered read shows no evidence of deep or superficial venous thrombosis in the right lower extremity. The patient has no chest pain or shortness of breath. Her vitals are stable on room air. She maintains SPO2 96%. Respiratory rate 16. She is not tachycardic. She denies any risk factors of DVTs. Given negative studies patient is appropriate for out patient follow-up and management. She does have a PCP and is agreeable to follow-up with them. I discussed with patient the results of evaluation in the ED, diagnosis, care, and prognosis. The plan is to discharge to home.   Patient is in

## 2022-02-28 NOTE — ED PROVIDER NOTES
[address] [facility]      Pt Name: Iva Rose  MRN: [id number]  [place] 1940  Date of evaluation: 2/28/2022  [facility]  Chief Complaint   Patient presents with    Knee Pain     called pcp, sent for DVT r/o, right knee pain       I have fully participated in the care of Iva Rose and have had a face-to-face evaluation. I have reviewed and agree with all pertinent clinical information, and midlevel provider's history, and physical exam. I have also reviewed the labs and imaging studies and treatment plan. I have also reviewed and agree with the medications, allergies and past medical history section for this Iva Rose. I agree with the diagnosis, and I concur. I wore personal protective equipment when I was in the room the entire time. This includes gloves, N95 mask, face shield, and a glove over my stethoscope for protection. No past medical history on file. MDM:  Iva Rose is a 78 y.o. female who presents with swelling of her right lower extremity. She denies any previous history of DVT. She was placed on Fosamax recently and started having the pain and swelling after she was placed on this medication. She denies any fevers or chills. She denies any nausea vomiting. She states it hurts to walk or move. Her PCP sent her in for a DVT rule out. Physical exam reveals tenderness of the calf of the right lower extremity with swelling greater on the right than on the left. There is no erythema noted. There is no evidence of cellulitis. Her Doppler study was negative for DVT. Her CBC and CMP were negative.   Therefore, patient was discharged to follow-up with her doctor    Vitals:    02/28/22 1300   BP: 130/74   Pulse: 88   Resp: 16   Temp:    SpO2: 96%       Lab results  Labs Reviewed   COMPREHENSIVE METABOLIC PANEL W/ REFLEX TO MG FOR LOW K - Abnormal; Notable for the following components:       Result Value    Sodium 135 (*)     Chloride 98 (*)     Glucose 101 (*)     ALT 6 (*)     All other components within normal limits    Narrative:     Performed at:  [facility]  [address] [place] [address]   Phone [phone]   CBC WITH AUTO DIFFERENTIAL    Narrative:     Performed at:  [facility]  [address] [place] [address]   Phone [phone]       Radiology results  VL Extremity Venous Right   Final Result          See discharge instructions for specific medications, discharge information, and treatments. They were verbally instructed to return to emergency if any problems. Medications - No data to display    Discharge Medication List as of 2/28/2022 12:54 PM          The patient's blood pressure was found to be elevated according to CMS/Medicare and the Affordable Care Act/ObamaCare criteria. Elevated blood pressure could occur because of pain or anxiety or other reasons and does not mean that they need to have their blood pressure treated or medications otherwise adjusted. However, this could also be a sign that they will need to have their blood pressure treated or medications changed. The patient was instructed to follow up closely with their personal physician to have their blood pressure rechecked. The patient was instructed to take a list of recent blood pressure readings to their next visit with their personal physician. IMPRESSIONS:  1.  Swelling of right lower extremity               Janna Painter DO  03/01/22 4220

## 2022-03-28 ENCOUNTER — HOSPITAL ENCOUNTER (OUTPATIENT)
Dept: WOMENS IMAGING | Age: 82
Discharge: HOME | End: 2022-03-28
Payer: MEDICARE

## 2022-03-28 DIAGNOSIS — Z12.31 BREAST CANCER SCREENING BY MAMMOGRAM: ICD-10-CM

## 2022-03-28 PROCEDURE — 77063 BREAST TOMOSYNTHESIS BI: CPT

## 2023-04-24 ENCOUNTER — HOSPITAL ENCOUNTER (OUTPATIENT)
Dept: WOMENS IMAGING | Age: 83
Discharge: HOME | End: 2023-04-24
Payer: MEDICARE

## 2023-04-24 DIAGNOSIS — Z12.31 BREAST CANCER SCREENING BY MAMMOGRAM: ICD-10-CM

## 2023-04-24 PROCEDURE — 77063 BREAST TOMOSYNTHESIS BI: CPT

## 2024-05-22 ENCOUNTER — HOSPITAL ENCOUNTER (OUTPATIENT)
Dept: WOMENS IMAGING | Age: 84
Discharge: HOME | End: 2024-05-22
Payer: MEDICARE

## 2024-05-22 DIAGNOSIS — Z12.31 SCREENING MAMMOGRAM FOR BREAST CANCER: ICD-10-CM

## 2024-05-22 PROCEDURE — 77063 BREAST TOMOSYNTHESIS BI: CPT

## 2025-04-09 ENCOUNTER — APPOINTMENT (OUTPATIENT)
Dept: CT IMAGING | Age: 85
DRG: 565 | End: 2025-04-09
Payer: MEDICARE

## 2025-04-09 ENCOUNTER — HOSPITAL ENCOUNTER (INPATIENT)
Age: 85
LOS: 5 days | Discharge: NURSING, REHAB OR LONG-TERM CARE | DRG: 565 | End: 2025-04-14
Attending: EMERGENCY MEDICINE | Admitting: HOSPITALIST
Payer: MEDICARE

## 2025-04-09 DIAGNOSIS — N30.00 ACUTE CYSTITIS WITHOUT HEMATURIA: ICD-10-CM

## 2025-04-09 DIAGNOSIS — Z71.89 GOALS OF CARE, COUNSELING/DISCUSSION: ICD-10-CM

## 2025-04-09 DIAGNOSIS — S01.81XA FACIAL LACERATION, INITIAL ENCOUNTER: ICD-10-CM

## 2025-04-09 DIAGNOSIS — I47.10 SVT (SUPRAVENTRICULAR TACHYCARDIA): ICD-10-CM

## 2025-04-09 DIAGNOSIS — A41.9 SEPTICEMIA (HCC): Primary | ICD-10-CM

## 2025-04-09 LAB
ALBUMIN SERPL-MCNC: 3.6 G/DL (ref 3.4–5)
ALBUMIN/GLOB SERPL: 1.1 {RATIO} (ref 1.1–2.2)
ALP SERPL-CCNC: 97 U/L (ref 40–129)
ALT SERPL-CCNC: 10 U/L (ref 10–40)
ANION GAP SERPL CALCULATED.3IONS-SCNC: 11 MMOL/L (ref 3–16)
AST SERPL-CCNC: 35 U/L (ref 15–37)
BACTERIA URNS QL MICRO: ABNORMAL /HPF
BASE EXCESS BLDV CALC-SCNC: -0.2 MMOL/L
BASOPHILS # BLD: 0 K/UL (ref 0–0.2)
BASOPHILS NFR BLD: 0.2 %
BILIRUB SERPL-MCNC: 0.4 MG/DL (ref 0–1)
BILIRUB UR QL STRIP.AUTO: NEGATIVE
BUN SERPL-MCNC: 16 MG/DL (ref 7–20)
CALCIUM SERPL-MCNC: 9.4 MG/DL (ref 8.3–10.6)
CHARACTER UR: ABNORMAL
CHLORIDE SERPL-SCNC: 106 MMOL/L (ref 99–110)
CK SERPL-CCNC: 403 U/L (ref 26–192)
CLARITY UR: ABNORMAL
CO2 BLDV-SCNC: 26 MMOL/L
CO2 SERPL-SCNC: 21 MMOL/L (ref 21–32)
COHGB MFR BLDV: 1.4 %
COLOR UR: ABNORMAL
CREAT SERPL-MCNC: 0.9 MG/DL (ref 0.6–1.2)
DEPRECATED RDW RBC AUTO: 15.3 % (ref 12.4–15.4)
EOSINOPHIL # BLD: 0 K/UL (ref 0–0.6)
EOSINOPHIL NFR BLD: 0 %
EPI CELLS #/AREA URNS AUTO: 4 /HPF (ref 0–5)
FLUAV + FLUBV AG NOSE IA.RAPID: NOT DETECTED
FLUAV + FLUBV AG NOSE IA.RAPID: NOT DETECTED
GFR SERPLBLD CREATININE-BSD FMLA CKD-EPI: 62 ML/MIN/{1.73_M2}
GLUCOSE SERPL-MCNC: 144 MG/DL (ref 70–99)
GLUCOSE UR STRIP.AUTO-MCNC: NEGATIVE MG/DL
HCO3 BLDV-SCNC: 25 MMOL/L (ref 23–29)
HCT VFR BLD AUTO: 38.6 % (ref 36–48)
HGB BLD-MCNC: 12.6 G/DL (ref 12–16)
HGB UR QL STRIP.AUTO: ABNORMAL
HYALINE CASTS #/AREA URNS AUTO: 4 /LPF (ref 0–8)
INR PPP: 1.2 (ref 0.85–1.15)
KETONES UR STRIP.AUTO-MCNC: 15 MG/DL
LACTATE BLDV-SCNC: 1.5 MMOL/L (ref 0.4–1.9)
LEUKOCYTE ESTERASE UR QL STRIP.AUTO: ABNORMAL
LYMPHOCYTES # BLD: 0.2 K/UL (ref 1–5.1)
LYMPHOCYTES NFR BLD: 1.2 %
MAGNESIUM SERPL-MCNC: 1.62 MG/DL (ref 1.8–2.4)
MCH RBC QN AUTO: 29.3 PG (ref 26–34)
MCHC RBC AUTO-ENTMCNC: 32.8 G/DL (ref 31–36)
MCV RBC AUTO: 89.4 FL (ref 80–100)
METHGB MFR BLDV: 0.1 %
MONOCYTES # BLD: 0.5 K/UL (ref 0–1.3)
MONOCYTES NFR BLD: 3.6 %
MUCOUS THREADS #/AREA URNS LPF: ABNORMAL /LPF
NEUTROPHILS # BLD: 13.8 K/UL (ref 1.7–7.7)
NEUTROPHILS NFR BLD: 95 %
NITRITE UR QL STRIP.AUTO: NEGATIVE
O2 THERAPY: NORMAL
PCO2 BLDV: 40.3 MMHG (ref 40–50)
PH BLDV: 7.4 [PH] (ref 7.35–7.45)
PH UR STRIP.AUTO: 6 [PH] (ref 5–8)
PHOSPHATE SERPL-MCNC: 1.9 MG/DL (ref 2.5–4.9)
PLATELET # BLD AUTO: 206 K/UL (ref 135–450)
PMV BLD AUTO: 7.8 FL (ref 5–10.5)
PO2 BLDV: <30 MMHG
POTASSIUM SERPL-SCNC: 3.4 MMOL/L (ref 3.5–5.1)
PROCALCITONIN SERPL IA-MCNC: 4.9 NG/ML (ref 0–0.15)
PROT SERPL-MCNC: 6.9 G/DL (ref 6.4–8.2)
PROT UR STRIP.AUTO-MCNC: 300 MG/DL
PROTHROMBIN TIME: 15.4 SEC (ref 11.9–14.9)
RBC # BLD AUTO: 4.31 M/UL (ref 4–5.2)
RBC CLUMPS #/AREA URNS AUTO: 6 /HPF (ref 0–4)
RENAL EPI CELLS #/AREA UR COMP ASSIST: ABNORMAL /HPF (ref 0–1)
SAO2 % BLDV: 45 %
SARS-COV-2 RDRP RESP QL NAA+PROBE: NOT DETECTED
SODIUM SERPL-SCNC: 138 MMOL/L (ref 136–145)
SP GR UR STRIP.AUTO: 1.02 (ref 1–1.03)
TROPONIN, HIGH SENSITIVITY: 45 NG/L (ref 0–14)
TROPONIN, HIGH SENSITIVITY: 50 NG/L (ref 0–14)
TSH SERPL DL<=0.005 MIU/L-ACNC: 1.26 UIU/ML (ref 0.27–4.2)
UA COMPLETE W REFLEX CULTURE PNL UR: ABNORMAL
UA DIPSTICK W REFLEX MICRO PNL UR: YES
URN SPEC COLLECT METH UR: ABNORMAL
UROBILINOGEN UR STRIP-ACNC: 0.2 E.U./DL
WBC # BLD AUTO: 14.5 K/UL (ref 4–11)
WBC #/AREA URNS HPF: ABNORMAL /HPF (ref 0–5)

## 2025-04-09 PROCEDURE — 93005 ELECTROCARDIOGRAM TRACING: CPT | Performed by: EMERGENCY MEDICINE

## 2025-04-09 PROCEDURE — 87502 INFLUENZA DNA AMP PROBE: CPT

## 2025-04-09 PROCEDURE — 83605 ASSAY OF LACTIC ACID: CPT

## 2025-04-09 PROCEDURE — 72125 CT NECK SPINE W/O DYE: CPT

## 2025-04-09 PROCEDURE — 84100 ASSAY OF PHOSPHORUS: CPT

## 2025-04-09 PROCEDURE — 6370000000 HC RX 637 (ALT 250 FOR IP): Performed by: EMERGENCY MEDICINE

## 2025-04-09 PROCEDURE — 82550 ASSAY OF CK (CPK): CPT

## 2025-04-09 PROCEDURE — 84443 ASSAY THYROID STIM HORMONE: CPT

## 2025-04-09 PROCEDURE — 96366 THER/PROPH/DIAG IV INF ADDON: CPT

## 2025-04-09 PROCEDURE — 81001 URINALYSIS AUTO W/SCOPE: CPT

## 2025-04-09 PROCEDURE — 6360000002 HC RX W HCPCS: Performed by: PHYSICIAN ASSISTANT

## 2025-04-09 PROCEDURE — 2580000003 HC RX 258: Performed by: PHYSICIAN ASSISTANT

## 2025-04-09 PROCEDURE — 70450 CT HEAD/BRAIN W/O DYE: CPT

## 2025-04-09 PROCEDURE — 6370000000 HC RX 637 (ALT 250 FOR IP): Performed by: PHYSICIAN ASSISTANT

## 2025-04-09 PROCEDURE — 83735 ASSAY OF MAGNESIUM: CPT

## 2025-04-09 PROCEDURE — 93005 ELECTROCARDIOGRAM TRACING: CPT | Performed by: PHYSICIAN ASSISTANT

## 2025-04-09 PROCEDURE — 93005 ELECTROCARDIOGRAM TRACING: CPT | Performed by: HOSPITALIST

## 2025-04-09 PROCEDURE — 80053 COMPREHEN METABOLIC PANEL: CPT

## 2025-04-09 PROCEDURE — 96365 THER/PROPH/DIAG IV INF INIT: CPT

## 2025-04-09 PROCEDURE — 87635 SARS-COV-2 COVID-19 AMP PRB: CPT

## 2025-04-09 PROCEDURE — 6360000002 HC RX W HCPCS

## 2025-04-09 PROCEDURE — 84484 ASSAY OF TROPONIN QUANT: CPT

## 2025-04-09 PROCEDURE — 2500000003 HC RX 250 WO HCPCS: Performed by: PHYSICIAN ASSISTANT

## 2025-04-09 PROCEDURE — 85025 COMPLETE CBC W/AUTO DIFF WBC: CPT

## 2025-04-09 PROCEDURE — 1200000000 HC SEMI PRIVATE

## 2025-04-09 PROCEDURE — 70486 CT MAXILLOFACIAL W/O DYE: CPT

## 2025-04-09 PROCEDURE — 96361 HYDRATE IV INFUSION ADD-ON: CPT

## 2025-04-09 PROCEDURE — 82803 BLOOD GASES ANY COMBINATION: CPT

## 2025-04-09 PROCEDURE — 6360000004 HC RX CONTRAST MEDICATION: Performed by: EMERGENCY MEDICINE

## 2025-04-09 PROCEDURE — 85610 PROTHROMBIN TIME: CPT

## 2025-04-09 PROCEDURE — 84145 PROCALCITONIN (PCT): CPT

## 2025-04-09 PROCEDURE — 87040 BLOOD CULTURE FOR BACTERIA: CPT

## 2025-04-09 PROCEDURE — 96375 TX/PRO/DX INJ NEW DRUG ADDON: CPT

## 2025-04-09 PROCEDURE — 74177 CT ABD & PELVIS W/CONTRAST: CPT

## 2025-04-09 PROCEDURE — 99285 EMERGENCY DEPT VISIT HI MDM: CPT

## 2025-04-09 RX ORDER — 0.9 % SODIUM CHLORIDE 0.9 %
1000 INTRAVENOUS SOLUTION INTRAVENOUS ONCE
Status: COMPLETED | OUTPATIENT
Start: 2025-04-09 | End: 2025-04-09

## 2025-04-09 RX ORDER — METOPROLOL TARTRATE 25 MG/1
25 TABLET, FILM COATED ORAL ONCE
Status: COMPLETED | OUTPATIENT
Start: 2025-04-09 | End: 2025-04-09

## 2025-04-09 RX ORDER — IOPAMIDOL 755 MG/ML
75 INJECTION, SOLUTION INTRAVASCULAR
Status: COMPLETED | OUTPATIENT
Start: 2025-04-09 | End: 2025-04-09

## 2025-04-09 RX ORDER — ADENOSINE 3 MG/ML
INJECTION, SOLUTION INTRAVENOUS
Status: COMPLETED
Start: 2025-04-09 | End: 2025-04-09

## 2025-04-09 RX ORDER — M-VIT,TX,IRON,MINS/CALC/FOLIC 27MG-0.4MG
1 TABLET ORAL DAILY
COMMUNITY

## 2025-04-09 RX ORDER — ACETAMINOPHEN 325 MG/1
650 TABLET ORAL EVERY 6 HOURS PRN
COMMUNITY

## 2025-04-09 RX ORDER — NITROFURANTOIN 25; 75 MG/1; MG/1
100 CAPSULE ORAL EVERY 12 HOURS
Status: ON HOLD | COMMUNITY
Start: 2025-04-07 | End: 2025-04-12 | Stop reason: HOSPADM

## 2025-04-09 RX ORDER — MAGNESIUM SULFATE IN WATER 40 MG/ML
2000 INJECTION, SOLUTION INTRAVENOUS ONCE
Status: COMPLETED | OUTPATIENT
Start: 2025-04-09 | End: 2025-04-09

## 2025-04-09 RX ORDER — ADENOSINE 3 MG/ML
INJECTION, SOLUTION INTRAVENOUS
Status: DISCONTINUED
Start: 2025-04-09 | End: 2025-04-09 | Stop reason: WASHOUT

## 2025-04-09 RX ADMIN — METOPROLOL TARTRATE 25 MG: 25 TABLET, FILM COATED ORAL at 22:29

## 2025-04-09 RX ADMIN — POTASSIUM & SODIUM PHOSPHATES POWDER PACK 280-160-250 MG 250 MG: 280-160-250 PACK at 22:02

## 2025-04-09 RX ADMIN — SODIUM CHLORIDE 1000 ML: 9 INJECTION, SOLUTION INTRAVENOUS at 20:18

## 2025-04-09 RX ADMIN — IOPAMIDOL 75 ML: 755 INJECTION, SOLUTION INTRAVENOUS at 21:20

## 2025-04-09 RX ADMIN — MAGNESIUM SULFATE HEPTAHYDRATE 2000 MG: 40 INJECTION, SOLUTION INTRAVENOUS at 21:32

## 2025-04-09 RX ADMIN — ADENOSINE 6 MG: 3 INJECTION, SOLUTION INTRAVENOUS at 19:54

## 2025-04-09 RX ADMIN — SODIUM CHLORIDE 1000 ML: 9 INJECTION, SOLUTION INTRAVENOUS at 21:36

## 2025-04-09 RX ADMIN — WATER 1000 MG: 1 INJECTION INTRAMUSCULAR; INTRAVENOUS; SUBCUTANEOUS at 21:22

## 2025-04-09 NOTE — ED PROVIDER NOTES
[facility] EMERGENCY DEPARTMENT  EMERGENCY DEPARTMENT ENCOUNTER        Pt Name: Xena Jimenez  MRN: [id number]  Birthdate 1940  Date of evaluation: 4/9/2025  Provider: JAXSON Berkowitz  PCP: Jessy Morales MD  Note Started: 7:16 PM EDT 4/9/25       I have seen and evaluated this patient with my supervising physician Regis Tavarez DO.      CHIEF COMPLAINT       Chief Complaint   Patient presents with    Fall     Pt presents in the ED via ems from home c/o fall. Pt states that she lost her balance then fell forward ot and hit her head. Per ems pt was on the floor for 24 hrs . Pt presents with a laceration above her left eye. Pt c/o lower back and left arm pain. Pt AXO 4, VSS.       HISTORY OF PRESENT ILLNESS: 1 or more Elements     History From: patient and family at bedside       Xena Jimenez is a 84 y.o. female with past medical history of pulmonary sarcoidosis, hypothyroidism, hyperparathyroidism, subsequent hypercalcemia, hyperlipidemia, hypertension who presents for evaluation of unwitnessed fall, patient presents via EMS from home.  She lives by herself independently however is in the process of being transitioned to a monitored facility.  Her downtime is unknown it could have been up to 24 hours.  Patient does not exactly remember the fall cannot tell me much.  Notes that she did not call or pressed her life alert because she got to be able to get up on her own.  Notes that approximately 3 days prior was started on antibiotic for UTI.  Denies any acute complaints presently.  Specifically denies palpitations lightheadedness shortness of breath chest pain abdominal pain pain to the head neck or back or extremities.    Nursing Notes were all reviewed and agreed with or any disagreements were addressed in the HPI.    REVIEW OF SYSTEMS :      Review of Systems    Positives and Pertinent negatives as per HPI.     SURGICAL HISTORY   History reviewed. No pertinent surgical 
186.  Patient was given Identicard which converted her immediately with 6 mg.  Repeat EKG showed a heart rate of 105 with no evidence of ischemia.  Chest x-ray was negative.  Her CK was 403.  Her initial troponin was 45 with repeat of 56.  Therefore, patient was admitted to hospital for further evaluation and treatment.     At 2230 patient had another episode of SVT.  It lasted 5 minutes and then patient spontaneously converted.  Therefore, Lopressor 25 mg orally was given to the patient.  She was also given oral potassium and magnesium for her low potassium and low magnesium.    Vitals:    04/09/25 2215   BP: (!) 144/126   Pulse: 98   Resp: 21   Temp:    SpO2: 98%       Lab results  Labs Reviewed   CBC WITH AUTO DIFFERENTIAL - Abnormal; Notable for the following components:       Result Value    WBC 14.5 (*)     Neutrophils Absolute 13.8 (*)     Lymphocytes Absolute 0.2 (*)     All other components within normal limits   COMPREHENSIVE METABOLIC PANEL W/ REFLEX TO MG FOR LOW K - Abnormal; Notable for the following components:    Potassium reflex Magnesium 3.4 (*)     Glucose 144 (*)     All other components within normal limits   TROPONIN - Abnormal; Notable for the following components:    Troponin, High Sensitivity 45 (*)     All other components within normal limits   TROPONIN - Abnormal; Notable for the following components:    Troponin, High Sensitivity 56 (*)     All other components within normal limits   PROTIME-INR - Abnormal; Notable for the following components:    Protime 15.4 (*)     INR 1.20 (*)     All other components within normal limits   URINALYSIS WITH REFLEX TO CULTURE - Abnormal; Notable for the following components:    Color, UA DARK YELLOW (*)     Clarity, UA CLOUDY (*)     Ketones, Urine 15 (*)     Blood, Urine LARGE (*)     Leukocyte Esterase, Urine TRACE (*)     All other components within normal limits   MAGNESIUM - Abnormal; Notable for the following components:    Magnesium 1.62 (*)

## 2025-04-10 ENCOUNTER — APPOINTMENT (OUTPATIENT)
Dept: ULTRASOUND IMAGING | Age: 85
DRG: 565 | End: 2025-04-10
Payer: MEDICARE

## 2025-04-10 LAB
ALBUMIN SERPL-MCNC: 3 G/DL (ref 3.4–5)
ALP SERPL-CCNC: 89 U/L (ref 40–129)
ALT SERPL-CCNC: 13 U/L (ref 10–40)
ANION GAP SERPL CALCULATED.3IONS-SCNC: 10 MMOL/L (ref 3–16)
AST SERPL-CCNC: 56 U/L (ref 15–37)
BASOPHILS # BLD: 0.1 K/UL (ref 0–0.2)
BASOPHILS NFR BLD: 0.7 %
BILIRUB DIRECT SERPL-MCNC: 0.2 MG/DL (ref 0–0.3)
BILIRUB INDIRECT SERPL-MCNC: 0.1 MG/DL (ref 0–1)
BILIRUB SERPL-MCNC: 0.3 MG/DL (ref 0–1)
BUN SERPL-MCNC: 15 MG/DL (ref 7–20)
CALCIUM SERPL-MCNC: 8.2 MG/DL (ref 8.3–10.6)
CHLORIDE SERPL-SCNC: 109 MMOL/L (ref 99–110)
CK SERPL-CCNC: 638 U/L (ref 26–192)
CK SERPL-CCNC: 729 U/L (ref 26–192)
CK SERPL-CCNC: 867 U/L (ref 26–192)
CO2 SERPL-SCNC: 20 MMOL/L (ref 21–32)
CREAT SERPL-MCNC: 0.7 MG/DL (ref 0.6–1.2)
DEPRECATED RDW RBC AUTO: 15.4 % (ref 12.4–15.4)
EKG ATRIAL RATE: 105 BPM
EKG ATRIAL RATE: 90 BPM
EKG DIAGNOSIS: NORMAL
EKG P AXIS: 12 DEGREES
EKG P AXIS: 42 DEGREES
EKG P-R INTERVAL: 126 MS
EKG P-R INTERVAL: 128 MS
EKG Q-T INTERVAL: 238 MS
EKG Q-T INTERVAL: 342 MS
EKG Q-T INTERVAL: 388 MS
EKG QRS DURATION: 136 MS
EKG QRS DURATION: 74 MS
EKG QRS DURATION: 80 MS
EKG QTC CALCULATION (BAZETT): 418 MS
EKG QTC CALCULATION (BAZETT): 452 MS
EKG QTC CALCULATION (BAZETT): 474 MS
EKG R AXIS: -23 DEGREES
EKG R AXIS: -24 DEGREES
EKG R AXIS: 1 DEGREES
EKG T AXIS: 180 DEGREES
EKG T AXIS: 50 DEGREES
EKG T AXIS: 61 DEGREES
EKG VENTRICULAR RATE: 105 BPM
EKG VENTRICULAR RATE: 186 BPM
EKG VENTRICULAR RATE: 90 BPM
EOSINOPHIL # BLD: 0.1 K/UL (ref 0–0.6)
EOSINOPHIL NFR BLD: 0.7 %
GFR SERPLBLD CREATININE-BSD FMLA CKD-EPI: 84 ML/MIN/{1.73_M2}
GLUCOSE SERPL-MCNC: 100 MG/DL (ref 70–99)
HCT VFR BLD AUTO: 31.6 % (ref 36–48)
HGB BLD-MCNC: 10.6 G/DL (ref 12–16)
LACTATE BLDV-SCNC: 1 MMOL/L (ref 0.4–2)
LACTATE BLDV-SCNC: 1.4 MMOL/L (ref 0.4–1.9)
LYMPHOCYTES # BLD: 0.3 K/UL (ref 1–5.1)
LYMPHOCYTES NFR BLD: 2.4 %
MAGNESIUM SERPL-MCNC: 2.15 MG/DL (ref 1.8–2.4)
MCH RBC QN AUTO: 29.7 PG (ref 26–34)
MCHC RBC AUTO-ENTMCNC: 33.4 G/DL (ref 31–36)
MCV RBC AUTO: 89 FL (ref 80–100)
MONOCYTES # BLD: 0.9 K/UL (ref 0–1.3)
MONOCYTES NFR BLD: 8.2 %
NEUTROPHILS # BLD: 9.8 K/UL (ref 1.7–7.7)
NEUTROPHILS NFR BLD: 88 %
PLATELET # BLD AUTO: 194 K/UL (ref 135–450)
PMV BLD AUTO: 8.1 FL (ref 5–10.5)
POTASSIUM SERPL-SCNC: 3.2 MMOL/L (ref 3.5–5.1)
PROT SERPL-MCNC: 5.8 G/DL (ref 6.4–8.2)
RBC # BLD AUTO: 3.55 M/UL (ref 4–5.2)
SODIUM SERPL-SCNC: 139 MMOL/L (ref 136–145)
TROPONIN, HIGH SENSITIVITY: 45 NG/L (ref 0–14)
TROPONIN, HIGH SENSITIVITY: 56 NG/L (ref 0–14)
TROPONIN, HIGH SENSITIVITY: 60 NG/L (ref 0–14)
WBC # BLD AUTO: 11.1 K/UL (ref 4–11)

## 2025-04-10 PROCEDURE — 87040 BLOOD CULTURE FOR BACTERIA: CPT

## 2025-04-10 PROCEDURE — 6370000000 HC RX 637 (ALT 250 FOR IP): Performed by: STUDENT IN AN ORGANIZED HEALTH CARE EDUCATION/TRAINING PROGRAM

## 2025-04-10 PROCEDURE — 6370000000 HC RX 637 (ALT 250 FOR IP): Performed by: HOSPITALIST

## 2025-04-10 PROCEDURE — 80048 BASIC METABOLIC PNL TOTAL CA: CPT

## 2025-04-10 PROCEDURE — 1200000000 HC SEMI PRIVATE

## 2025-04-10 PROCEDURE — 94760 N-INVAS EAR/PLS OXIMETRY 1: CPT

## 2025-04-10 PROCEDURE — 97166 OT EVAL MOD COMPLEX 45 MIN: CPT

## 2025-04-10 PROCEDURE — 36415 COLL VENOUS BLD VENIPUNCTURE: CPT

## 2025-04-10 PROCEDURE — 83735 ASSAY OF MAGNESIUM: CPT

## 2025-04-10 PROCEDURE — 84484 ASSAY OF TROPONIN QUANT: CPT

## 2025-04-10 PROCEDURE — 85025 COMPLETE CBC W/AUTO DIFF WBC: CPT

## 2025-04-10 PROCEDURE — 97530 THERAPEUTIC ACTIVITIES: CPT

## 2025-04-10 PROCEDURE — 82550 ASSAY OF CK (CPK): CPT

## 2025-04-10 PROCEDURE — 93010 ELECTROCARDIOGRAM REPORT: CPT | Performed by: INTERNAL MEDICINE

## 2025-04-10 PROCEDURE — 6360000002 HC RX W HCPCS: Performed by: HOSPITALIST

## 2025-04-10 PROCEDURE — 80076 HEPATIC FUNCTION PANEL: CPT

## 2025-04-10 PROCEDURE — 76770 US EXAM ABDO BACK WALL COMP: CPT

## 2025-04-10 PROCEDURE — 2580000003 HC RX 258: Performed by: HOSPITALIST

## 2025-04-10 PROCEDURE — 2580000003 HC RX 258: Performed by: STUDENT IN AN ORGANIZED HEALTH CARE EDUCATION/TRAINING PROGRAM

## 2025-04-10 PROCEDURE — 2500000003 HC RX 250 WO HCPCS: Performed by: HOSPITALIST

## 2025-04-10 PROCEDURE — 83605 ASSAY OF LACTIC ACID: CPT

## 2025-04-10 RX ORDER — ALLOPURINOL 100 MG/1
100 TABLET ORAL 2 TIMES DAILY
Status: DISCONTINUED | OUTPATIENT
Start: 2025-04-10 | End: 2025-04-14 | Stop reason: HOSPADM

## 2025-04-10 RX ORDER — SODIUM CHLORIDE 0.9 % (FLUSH) 0.9 %
5-40 SYRINGE (ML) INJECTION PRN
Status: DISCONTINUED | OUTPATIENT
Start: 2025-04-10 | End: 2025-04-14 | Stop reason: HOSPADM

## 2025-04-10 RX ORDER — POTASSIUM CHLORIDE 7.45 MG/ML
10 INJECTION INTRAVENOUS PRN
Status: DISCONTINUED | OUTPATIENT
Start: 2025-04-10 | End: 2025-04-14 | Stop reason: HOSPADM

## 2025-04-10 RX ORDER — ACETAMINOPHEN 325 MG/1
650 TABLET ORAL EVERY 6 HOURS PRN
Status: DISCONTINUED | OUTPATIENT
Start: 2025-04-10 | End: 2025-04-14 | Stop reason: HOSPADM

## 2025-04-10 RX ORDER — MAGNESIUM SULFATE IN WATER 40 MG/ML
2000 INJECTION, SOLUTION INTRAVENOUS PRN
Status: DISCONTINUED | OUTPATIENT
Start: 2025-04-10 | End: 2025-04-14 | Stop reason: HOSPADM

## 2025-04-10 RX ORDER — SODIUM CHLORIDE 0.9 % (FLUSH) 0.9 %
5-40 SYRINGE (ML) INJECTION EVERY 12 HOURS SCHEDULED
Status: DISCONTINUED | OUTPATIENT
Start: 2025-04-10 | End: 2025-04-14 | Stop reason: HOSPADM

## 2025-04-10 RX ORDER — ONDANSETRON 2 MG/ML
4 INJECTION INTRAMUSCULAR; INTRAVENOUS EVERY 6 HOURS PRN
Status: DISCONTINUED | OUTPATIENT
Start: 2025-04-10 | End: 2025-04-14 | Stop reason: HOSPADM

## 2025-04-10 RX ORDER — SODIUM CHLORIDE 9 MG/ML
INJECTION, SOLUTION INTRAVENOUS PRN
Status: DISCONTINUED | OUTPATIENT
Start: 2025-04-10 | End: 2025-04-14 | Stop reason: HOSPADM

## 2025-04-10 RX ORDER — LEVOTHYROXINE SODIUM 88 UG/1
88 TABLET ORAL
Status: DISCONTINUED | OUTPATIENT
Start: 2025-04-10 | End: 2025-04-14 | Stop reason: HOSPADM

## 2025-04-10 RX ORDER — SODIUM CHLORIDE 9 MG/ML
INJECTION, SOLUTION INTRAVENOUS CONTINUOUS
Status: DISCONTINUED | OUTPATIENT
Start: 2025-04-10 | End: 2025-04-10

## 2025-04-10 RX ORDER — SODIUM CHLORIDE, SODIUM LACTATE, POTASSIUM CHLORIDE, CALCIUM CHLORIDE 600; 310; 30; 20 MG/100ML; MG/100ML; MG/100ML; MG/100ML
INJECTION, SOLUTION INTRAVENOUS CONTINUOUS
Status: DISCONTINUED | OUTPATIENT
Start: 2025-04-10 | End: 2025-04-11

## 2025-04-10 RX ORDER — POTASSIUM CHLORIDE 1500 MG/1
40 TABLET, EXTENDED RELEASE ORAL PRN
Status: DISCONTINUED | OUTPATIENT
Start: 2025-04-10 | End: 2025-04-14 | Stop reason: HOSPADM

## 2025-04-10 RX ORDER — ACETAMINOPHEN 650 MG/1
650 SUPPOSITORY RECTAL EVERY 6 HOURS PRN
Status: DISCONTINUED | OUTPATIENT
Start: 2025-04-10 | End: 2025-04-14 | Stop reason: HOSPADM

## 2025-04-10 RX ORDER — BACITRACIN ZINC AND POLYMYXIN B SULFATE 500; 1000 [USP'U]/G; [USP'U]/G
OINTMENT TOPICAL 2 TIMES DAILY
Status: DISCONTINUED | OUTPATIENT
Start: 2025-04-10 | End: 2025-04-14 | Stop reason: HOSPADM

## 2025-04-10 RX ORDER — ONDANSETRON 4 MG/1
4 TABLET, ORALLY DISINTEGRATING ORAL EVERY 8 HOURS PRN
Status: DISCONTINUED | OUTPATIENT
Start: 2025-04-10 | End: 2025-04-14 | Stop reason: HOSPADM

## 2025-04-10 RX ORDER — ASPIRIN 81 MG/1
81 TABLET, CHEWABLE ORAL DAILY
Status: DISCONTINUED | OUTPATIENT
Start: 2025-04-10 | End: 2025-04-14 | Stop reason: HOSPADM

## 2025-04-10 RX ORDER — ENOXAPARIN SODIUM 100 MG/ML
40 INJECTION SUBCUTANEOUS DAILY
Status: DISCONTINUED | OUTPATIENT
Start: 2025-04-10 | End: 2025-04-14 | Stop reason: HOSPADM

## 2025-04-10 RX ORDER — POLYETHYLENE GLYCOL 3350 17 G/17G
17 POWDER, FOR SOLUTION ORAL DAILY PRN
Status: DISCONTINUED | OUTPATIENT
Start: 2025-04-10 | End: 2025-04-14 | Stop reason: HOSPADM

## 2025-04-10 RX ADMIN — SODIUM CHLORIDE, PRESERVATIVE FREE 10 ML: 5 INJECTION INTRAVENOUS at 19:57

## 2025-04-10 RX ADMIN — SODIUM CHLORIDE, POTASSIUM CHLORIDE, SODIUM LACTATE AND CALCIUM CHLORIDE: 600; 310; 30; 20 INJECTION, SOLUTION INTRAVENOUS at 17:31

## 2025-04-10 RX ADMIN — LEVOTHYROXINE SODIUM 88 MCG: 0.09 TABLET ORAL at 06:27

## 2025-04-10 RX ADMIN — ASPIRIN 81 MG: 81 TABLET, CHEWABLE ORAL at 09:34

## 2025-04-10 RX ADMIN — POTASSIUM CHLORIDE 40 MEQ: 1500 TABLET, EXTENDED RELEASE ORAL at 11:37

## 2025-04-10 RX ADMIN — SODIUM CHLORIDE, POTASSIUM CHLORIDE, SODIUM LACTATE AND CALCIUM CHLORIDE: 600; 310; 30; 20 INJECTION, SOLUTION INTRAVENOUS at 09:37

## 2025-04-10 RX ADMIN — ALLOPURINOL 100 MG: 100 TABLET ORAL at 09:34

## 2025-04-10 RX ADMIN — ALLOPURINOL 100 MG: 100 TABLET ORAL at 19:58

## 2025-04-10 RX ADMIN — BACITRACIN ZINC AND POLYMYXIN B SULFATE: 500; 10000 OINTMENT TOPICAL at 15:45

## 2025-04-10 RX ADMIN — ENOXAPARIN SODIUM 40 MG: 100 INJECTION SUBCUTANEOUS at 09:35

## 2025-04-10 RX ADMIN — SODIUM CHLORIDE, PRESERVATIVE FREE 10 ML: 5 INJECTION INTRAVENOUS at 09:34

## 2025-04-10 RX ADMIN — SODIUM CHLORIDE: 0.9 INJECTION, SOLUTION INTRAVENOUS at 01:33

## 2025-04-10 RX ADMIN — BACITRACIN ZINC AND POLYMYXIN B SULFATE: 500; 10000 OINTMENT TOPICAL at 19:58

## 2025-04-10 RX ADMIN — ALLOPURINOL 100 MG: 100 TABLET ORAL at 01:35

## 2025-04-10 ASSESSMENT — PAIN SCALES - GENERAL
PAINLEVEL_OUTOF10: 0
PAINLEVEL_OUTOF10: 0

## 2025-04-10 NOTE — PLAN OF CARE
Problem: Discharge Planning  Goal: Discharge to home or other facility with appropriate resources  4/10/2025 0942 by Dionne Lane RN  Outcome: Progressing  4/10/2025 0942 by Dionne Lane RN  Outcome: Progressing  4/10/2025 0120 by Abigail Galvin RN  Outcome: Progressing  Flowsheets (Taken 4/10/2025 0120)  Discharge to home or other facility with appropriate resources:   Identify barriers to discharge with patient and caregiver   Arrange for needed discharge resources and transportation as appropriate   Identify discharge learning needs (meds, wound care, etc)   Arrange for interpreters to assist at discharge as needed     Problem: Pain  Goal: Verbalizes/displays adequate comfort level or baseline comfort level  4/10/2025 0942 by Dionne Lane RN  Outcome: Progressing  4/10/2025 0942 by Dionne Lane RN  Outcome: Progressing  4/10/2025 0120 by Abigail Galvin RN  Outcome: Progressing  Flowsheets (Taken 4/10/2025 0120)  Verbalizes/displays adequate comfort level or baseline comfort level:   Encourage patient to monitor pain and request assistance   Assess pain using appropriate pain scale   Administer analgesics based on type and severity of pain and evaluate response   Implement non-pharmacological measures as appropriate and evaluate response   Consider cultural and social influences on pain and pain management     Problem: Safety - Adult  Goal: Free from fall injury  4/10/2025 0942 by Dionne Lane RN  Outcome: Progressing  4/10/2025 0942 by Dionne Lane RN  Outcome: Progressing  4/10/2025 0120 by Abigail Galvin RN  Outcome: Progressing  Flowsheets (Taken 4/10/2025 0120)  Free From Fall Injury: Instruct family/caregiver on patient safety     Problem: ABCDS Injury Assessment  Goal: Absence of physical injury  4/10/2025 0942 by Dionne Lane RN  Outcome: Progressing  4/10/2025 0942 by Dionne Lane RN  Outcome: Progressing  4/10/2025 0120 by Abigail Galvin RN  Outcome: Progressing  Flowsheets (Taken

## 2025-04-10 NOTE — ED NOTES
This RN walked into pt room and pt was tachy on the monitor. Pt -188s. Provider called at bedside. Verbal order for adenosine ordered.

## 2025-04-10 NOTE — H&P
HISTORY AND PHYSICAL             Date: 4/10/2025        Patient Name: Xena Jimenez     YOB: 1940      Age:  84 y.o.    Chief Complaint     Chief Complaint   Patient presents with    Fall     Pt presents in the ED via ems from home c/o fall. Pt states that she lost her balance then fell forward ot and hit her head. Per ems pt was on the floor for 24 hrs . Pt presents with a laceration above her left eye. Pt c/o lower back and left arm pain. Pt AXO 4, VSS.          History Obtained From   patient    History of Present Illness   84f presents from home where she was noted to have fallen. Patient denies chest pain, palpitations, lightheadedness or dizziness. She remembers losing her balance and falling, but denies LOC. She is unsure when she fell, but states she was unable to stand secondary to arthritic pain.   Currently she is seen on room air, awake, alert, disoriented, in no distress.     Past Medical History   History reviewed. No pertinent past medical history.     Past Surgical History   History reviewed. No pertinent surgical history.     Medications Prior to Admission     Prior to Admission medications    Medication Sig Start Date End Date Taking? Authorizing Provider   nitrofurantoin, macrocrystal-monohydrate, (MACROBID) 100 MG capsule Take 1 capsule by mouth in the morning and 1 capsule in the evening. 4/7/25 4/17/25 Yes Donita Larson MD   Multiple Vitamins-Minerals (THERAPEUTIC MULTIVITAMIN-MINERALS) tablet Take 1 tablet by mouth daily   Yes Donita Larson MD   acetaminophen (TYLENOL) 325 MG tablet Take 2 tablets by mouth every 6 hours as needed for Pain   Yes Donita Larson MD   aspirin 81 MG chewable tablet Take 1 tablet by mouth daily   Yes Donita Larson MD   levothyroxine (SYNTHROID) 88 MCG tablet Take 1 tablet by mouth See Admin Instructions Take 1 tablet by mouth daily except Saturdays and Sundays 4/12/21  Yes Donita Larson MD   allopurinol

## 2025-04-10 NOTE — CARE COORDINATION
Wound Referral Progress Note       NAME:  Xena Jimenez  MEDICAL RECORD NUMBER:  [id number]  AGE: 84 y.o.   GENDER: female  : 1940  TODAY'S DATE:  4/10/2025    Subjective   Reason for WOCN Evaluation and Assessment: sutures, facial  hematoma      Xena Jimenez is a 84 y.o. female referred by:   Nursing    Wound Identification:  Wound Type: traumatic  Contributing Factors: none    Wound History: pt fell, hit head, causing laceration to left eyebrow.   Current Wound Care Treatment:  sutured in ED and left MAC    Patient Care Goal:  Wound Healing        PAST MEDICAL HISTORY    History reviewed. No pertinent past medical history.    PAST SURGICAL HISTORY    History reviewed. No pertinent surgical history.    FAMILY HISTORY    History reviewed. No pertinent family history.    SOCIAL HISTORY    Social History     Tobacco Use    Smoking status: Never    Smokeless tobacco: Never       ALLERGIES    Allergies   Allergen Reactions    Sulfa Antibiotics Nausea Only       MEDICATIONS    No current facility-administered medications on file prior to encounter.     Current Outpatient Medications on File Prior to Encounter   Medication Sig Dispense Refill    nitrofurantoin, macrocrystal-monohydrate, (MACROBID) 100 MG capsule Take 1 capsule by mouth in the morning and 1 capsule in the evening.      Multiple Vitamins-Minerals (THERAPEUTIC MULTIVITAMIN-MINERALS) tablet Take 1 tablet by mouth daily      acetaminophen (TYLENOL) 325 MG tablet Take 2 tablets by mouth every 6 hours as needed for Pain      aspirin 81 MG chewable tablet Take 1 tablet by mouth daily      levothyroxine (SYNTHROID) 88 MCG tablet Take 1 tablet by mouth See Admin Instructions Take 1 tablet by mouth daily except  and Sundays      allopurinol (ZYLOPRIM) 100 MG tablet Take 1 tablet by mouth 2 times daily         Objective    BP (!) 110/48   Pulse 75   Temp 98.1 °F (36.7 °C) (Oral)   Resp 21   Ht 1.575 m (5' 2\")   Wt 63.2 kg (139 lb 5.3 oz)

## 2025-04-10 NOTE — DISCHARGE INSTR - COC
Continuity of Care Form    Patient Name: Xena Jimenez   :  1940  MRN:  [id number]    Admit date:  2025  Discharge date:  2025    Code Status Order: Full Code   Advance Directives:     Admitting Physician:  Eri Qureshi Jr., MD  PCP: Jessy Morales MD    Discharging Nurse: Deisi COOLEY  Discharging Hospital Unit/Room#: J7N-2202/5271-01  Discharging Unit Phone Number: [phone]    Emergency Contact:   Extended Emergency Contact Information  Primary Emergency Contact: Phylicia Pride  Home Phone: [phone]  Mobile Phone: [phone]  Relation: Child  Secondary Emergency Contact: ANNA OJHNSTON  Home Phone: [phone]  Work Phone: [phone]  Relation: Child    Past Surgical History:  History reviewed. No pertinent surgical history.    Immunization History:   Immunization History   Administered Date(s) Administered    COVID-19, MODERNA Bivalent, (age 12y+), IM, 50 mcg/0.5 mL 10/15/2022, 2023    COVID-19, MODERNA, , (age 12y+), IM, 50mcg/0.5mL 2023, 2024       Active Problems:  Patient Active Problem List   Diagnosis Code    Vertigo R42    Sepsis ([facility]) A41.9       Isolation/Infection:   Isolation            C Diff Contact          Patient Infection Status        Infection Onset Added Last Indicated Last Indicated By Review Planned Expiration    C-diff Rule Out 04/09/25 04/09/25 04/10/25 Clostridium Difficile Toxin/Antigen 25 history                         Nurse Assessment:  Last Vital Signs: BP (!) 110/48   Pulse 75   Temp 98.1 °F (36.7 °C) (Oral)   Resp 21   Ht 1.575 m (5' 2\")   Wt 63.2 kg (139 lb 5.3 oz)   SpO2 95%   BMI 25.48 kg/m²     Last documented pain score (0-10 scale): Pain Level: 0  Last Weight:   Wt Readings from Last 1 Encounters:   04/10/25 63.2 kg (139 lb 5.3 oz)     Mental Status:  oriented and alert    IV Access:  - None    Nursing Mobility/ADLs:  Walking   Assisted  Transfer  Assisted  Bathing  Assisted  Dressing

## 2025-04-10 NOTE — ED NOTES
ED TO INPATIENT SBAR HANDOFF    Patient Name: Xena Jimenez   Preferred Name: Xena  : 1940  84 y.o.   Family/Caregiver Present: no   Code Status Order: Prior  PO Status: NPO:No  Telemetry Order:   C-SSRS: Risk of Suicide: No Risk  Sitter no     Restraints:     Sepsis Risk Score      Situation  Chief Complaint   Patient presents with    Fall     Pt presents in the ED via ems from home c/o fall. Pt states that she lost her balance then fell forward ot and hit her head. Per ems pt was on the floor for 24 hrs . Pt presents with a laceration above her left eye. Pt c/o lower back and left arm pain. Pt AXO 4, VSS.     Brief Description of Patient's Condition:  fall  Mental Status: alert  Arrived from:Home  Imaging:   CT LUMBAR SPINE BONY RECONSTRUCTION   Preliminary Result   1. No evidence of traumatic injury to the chest, abdomen or pelvis.   2. No evidence of traumatic injury to the thoracic spine.   3. No evidence of traumatic injury to the lumbar spine.   4. Bilateral nodular airspace disease with ill-defined margins, suggesting an   inflammatory process.  Short-term CT follow-up in 3 months is recommended to   ensure resolution.   5. Hepatic steatosis.   6. Diverticulosis.         CT THORACIC SPINE BONY RECONSTRUCTION   Preliminary Result   1. No evidence of traumatic injury to the chest, abdomen or pelvis.   2. No evidence of traumatic injury to the thoracic spine.   3. No evidence of traumatic injury to the lumbar spine.   4. Bilateral nodular airspace disease with ill-defined margins, suggesting an   inflammatory process.  Short-term CT follow-up in 3 months is recommended to   ensure resolution.   5. Hepatic steatosis.   6. Diverticulosis.         CT CHEST ABDOMEN PELVIS W CONTRAST Additional Contrast? None   Preliminary Result   1. No evidence of traumatic injury to the chest, abdomen or pelvis.   2. No evidence of traumatic injury to the thoracic spine.   3. No evidence of traumatic injury to the

## 2025-04-11 LAB
ANION GAP SERPL CALCULATED.3IONS-SCNC: 9 MMOL/L (ref 3–16)
BACTERIA URNS QL MICRO: NORMAL /HPF
BILIRUB UR QL STRIP.AUTO: NEGATIVE
BUN SERPL-MCNC: 16 MG/DL (ref 7–20)
CALCIUM SERPL-MCNC: 8.6 MG/DL (ref 8.3–10.6)
CHLORIDE SERPL-SCNC: 109 MMOL/L (ref 99–110)
CLARITY UR: CLEAR
CO2 SERPL-SCNC: 22 MMOL/L (ref 21–32)
COLOR UR: YELLOW
CREAT SERPL-MCNC: 0.7 MG/DL (ref 0.6–1.2)
EPI CELLS #/AREA URNS AUTO: 0 /HPF (ref 0–5)
GFR SERPLBLD CREATININE-BSD FMLA CKD-EPI: 84 ML/MIN/{1.73_M2}
GLUCOSE SERPL-MCNC: 91 MG/DL (ref 70–99)
GLUCOSE UR STRIP.AUTO-MCNC: NEGATIVE MG/DL
HGB UR QL STRIP.AUTO: ABNORMAL
HYALINE CASTS #/AREA URNS AUTO: 2 /LPF (ref 0–8)
KETONES UR STRIP.AUTO-MCNC: NEGATIVE MG/DL
LEUKOCYTE ESTERASE UR QL STRIP.AUTO: NEGATIVE
NITRITE UR QL STRIP.AUTO: NEGATIVE
PH UR STRIP.AUTO: 5.5 [PH] (ref 5–8)
POTASSIUM SERPL-SCNC: 3.8 MMOL/L (ref 3.5–5.1)
PROT UR STRIP.AUTO-MCNC: NEGATIVE MG/DL
RBC CLUMPS #/AREA URNS AUTO: 1 /HPF (ref 0–4)
SODIUM SERPL-SCNC: 140 MMOL/L (ref 136–145)
SP GR UR STRIP.AUTO: 1.01 (ref 1–1.03)
UA COMPLETE W REFLEX CULTURE PNL UR: ABNORMAL
UA DIPSTICK W REFLEX MICRO PNL UR: YES
URN SPEC COLLECT METH UR: ABNORMAL
UROBILINOGEN UR STRIP-ACNC: 0.2 E.U./DL
WBC #/AREA URNS AUTO: 0 /HPF (ref 0–5)

## 2025-04-11 PROCEDURE — 6370000000 HC RX 637 (ALT 250 FOR IP): Performed by: STUDENT IN AN ORGANIZED HEALTH CARE EDUCATION/TRAINING PROGRAM

## 2025-04-11 PROCEDURE — 2580000003 HC RX 258: Performed by: STUDENT IN AN ORGANIZED HEALTH CARE EDUCATION/TRAINING PROGRAM

## 2025-04-11 PROCEDURE — 6370000000 HC RX 637 (ALT 250 FOR IP): Performed by: HOSPITALIST

## 2025-04-11 PROCEDURE — 6360000002 HC RX W HCPCS: Performed by: STUDENT IN AN ORGANIZED HEALTH CARE EDUCATION/TRAINING PROGRAM

## 2025-04-11 PROCEDURE — 81001 URINALYSIS AUTO W/SCOPE: CPT

## 2025-04-11 PROCEDURE — 97530 THERAPEUTIC ACTIVITIES: CPT

## 2025-04-11 PROCEDURE — 36415 COLL VENOUS BLD VENIPUNCTURE: CPT

## 2025-04-11 PROCEDURE — 97162 PT EVAL MOD COMPLEX 30 MIN: CPT

## 2025-04-11 PROCEDURE — 2500000003 HC RX 250 WO HCPCS: Performed by: HOSPITALIST

## 2025-04-11 PROCEDURE — 6360000002 HC RX W HCPCS: Performed by: HOSPITALIST

## 2025-04-11 PROCEDURE — 1200000000 HC SEMI PRIVATE

## 2025-04-11 PROCEDURE — 97535 SELF CARE MNGMENT TRAINING: CPT

## 2025-04-11 PROCEDURE — 80048 BASIC METABOLIC PNL TOTAL CA: CPT

## 2025-04-11 PROCEDURE — 94760 N-INVAS EAR/PLS OXIMETRY 1: CPT

## 2025-04-11 RX ORDER — FUROSEMIDE 10 MG/ML
40 INJECTION INTRAMUSCULAR; INTRAVENOUS ONCE
Status: COMPLETED | OUTPATIENT
Start: 2025-04-11 | End: 2025-04-11

## 2025-04-11 RX ORDER — BENZONATATE 100 MG/1
100 CAPSULE ORAL 3 TIMES DAILY PRN
Status: DISCONTINUED | OUTPATIENT
Start: 2025-04-11 | End: 2025-04-14 | Stop reason: HOSPADM

## 2025-04-11 RX ADMIN — SODIUM CHLORIDE, PRESERVATIVE FREE 10 ML: 5 INJECTION INTRAVENOUS at 21:19

## 2025-04-11 RX ADMIN — LEVOTHYROXINE SODIUM 88 MCG: 0.09 TABLET ORAL at 06:28

## 2025-04-11 RX ADMIN — SODIUM CHLORIDE, PRESERVATIVE FREE 10 ML: 5 INJECTION INTRAVENOUS at 08:43

## 2025-04-11 RX ADMIN — SODIUM CHLORIDE, POTASSIUM CHLORIDE, SODIUM LACTATE AND CALCIUM CHLORIDE: 600; 310; 30; 20 INJECTION, SOLUTION INTRAVENOUS at 01:19

## 2025-04-11 RX ADMIN — ALLOPURINOL 100 MG: 100 TABLET ORAL at 08:43

## 2025-04-11 RX ADMIN — BENZONATATE 100 MG: 100 CAPSULE ORAL at 08:44

## 2025-04-11 RX ADMIN — FUROSEMIDE 40 MG: 10 INJECTION, SOLUTION INTRAMUSCULAR; INTRAVENOUS at 12:09

## 2025-04-11 RX ADMIN — ASPIRIN 81 MG: 81 TABLET, CHEWABLE ORAL at 08:43

## 2025-04-11 RX ADMIN — BACITRACIN ZINC AND POLYMYXIN B SULFATE: 500; 10000 OINTMENT TOPICAL at 21:19

## 2025-04-11 RX ADMIN — BACITRACIN ZINC AND POLYMYXIN B SULFATE: 500; 10000 OINTMENT TOPICAL at 08:43

## 2025-04-11 RX ADMIN — ALLOPURINOL 100 MG: 100 TABLET ORAL at 21:19

## 2025-04-11 RX ADMIN — ENOXAPARIN SODIUM 40 MG: 100 INJECTION SUBCUTANEOUS at 08:43

## 2025-04-11 NOTE — PLAN OF CARE
Problem: Discharge Planning  Goal: Discharge to home or other facility with appropriate resources  4/11/2025 1030 by Martin Kemp RN  Outcome: Progressing     Problem: Pain  Goal: Verbalizes/displays adequate comfort level or baseline comfort level  4/11/2025 1030 by Martin Kemp RN  Outcome: Progressing     Problem: Safety - Adult  Goal: Free from fall injury  4/11/2025 1030 by Martin Kemp RN  Outcome: Progressing     Problem: ABCDS Injury Assessment  Goal: Absence of physical injury  4/11/2025 1030 by Martin Kemp RN  Outcome: Progressing

## 2025-04-11 NOTE — CARE COORDINATION
The Plan for Transition of Care is related to the following treatment goals:     Acute rehab at 5/7 days per week.    The Patient and/or patient representative was provided with a choice of provider [x] Yes [] No and agrees with the discharge plan. [] Yes [x] No    Freedom of choice list was provided with basic dialogue that supports the patient's individualized plan of care/goals, treatment preferences and shares the quality data associated with the providers. [x] Yes [] No    CHOLO met with patient and daughter Phylicia today and explained that her insurance would likely not pay for the rehab recommendation listed above.     She requested a [employer] SNF list and one was given. CHOLO sent referrals in no order of preference to the following facilities:    [facility]    Respectfully submitted,    RILEY Padron [place]   [phone]    Electronically signed by MARTINEZ Mcdonald, LITZY on 4/11/2025 at 5:07 PM

## 2025-04-11 NOTE — ACP (ADVANCE CARE PLANNING)
Advance Care Planning   Healthcare Decision Maker:    Primary Decision Maker: Phylicia Pride - Child - [phone]    Primary Decision Maker: ANNA JOHNSTON - Child - [phone]    Respectfully submitted,    Ksenia HENRIQUEZ, [facility]   [phone]    Electronically signed by MARTINEZ Mcdonald, LSW on 4/11/2025 at 5:02 PM

## 2025-04-11 NOTE — CONSULTS
Xena Jimenez  4/11/2025  [id number]    Chief Complaint: Sepsis (HCC), rhabdomyolysis    Subjective   HPI: Rehab consult received.  Chart reviewed.  Patient discussed with our rehab unit admission nurse.  Xena Jimenez is an 84 female who presents from home where she was noted to have fallen. Patient denies chest pain, palpitations, lightheadedness or dizziness. She remembers losing her balance and falling, but denies LOC. She is unsure when she fell, but states she was unable to stand secondary to arthritic pain.  Workup was negative for any fractures in her cervical spine, and no acute intracranial abnormality was noted.  Her UA was negative for infection, but her total CK was elevated to 867.  Patient was started in therapies, needs contact-guard assist for transfers and ambulation with a rolling walker for 40 feet.  Patient lives at home alone in a two-level house with 5 steps to enter.  Patient has [employer] Medicare insurance.    History reviewed. No pertinent past medical history.    History reviewed. No pertinent surgical history.    Social History     Tobacco Use    Smoking status: Never    Smokeless tobacco: Never       Prior Level of Function:   Independent with transfers, gait, and self-care activities.         Objective   Objective:  Patient Vitals for the past 24 hrs:   BP Temp Temp src Pulse Resp SpO2   04/11/25 0813 -- -- -- -- -- 97 %   04/11/25 0709 136/78 98.2 °F (36.8 °C) Oral 82 20 97 %   04/11/25 0257 (!) 126/52 98.2 °F (36.8 °C) -- 77 14 97 %   04/10/25 2302 (!) 141/77 98 °F (36.7 °C) Oral -- 15 94 %   04/10/25 1954 130/68 98 °F (36.7 °C) Oral -- -- 95 %   04/10/25 1548 (!) 111/54 99.9 °F (37.7 °C) Oral 77 21 93 %     Laboratory data: Available via EMR.     Therapy progress:    PT    Rolling: Level of difficulty - A Little   Sit to Stand from a Chair: Level of difficulty - A Little  Supine to Sit: Level of difficulty - A Little     Bed to Chair: Physical Assistance Required - A Little  Ambulate

## 2025-04-11 NOTE — CARE COORDINATION
Case Management Assessment  Initial Evaluation    Date/Time of Evaluation: 4/11/2025 5:15 PM  Assessment Completed by: MARTINEZ Mcdonald, GARYW    If patient is discharged prior to next notation, then this note serves as note for discharge by case management.    Patient Name: Xena Jimenez                   YOB: 1940  Diagnosis: SVT (supraventricular tachycardia) [I47.10]  Septicemia (HCC) [A41.9]  Acute cystitis without hematuria [N30.00]  Facial laceration, initial encounter [S01.81XA]  Sepsis (HCC) [A41.9]                   Date / Time: 4/9/2025  7:15 PM    Patient Admission Status: Inpatient   Readmission Risk (Low < 19, Mod (19-27), High > 27): Readmission Risk Score: 10.5    Current PCP: Jessy Morales MD  PCP verified by CM? Yes    Chart Reviewed: Yes      History Provided by: Patient  Patient Orientation: Alert and Oriented    Patient Cognition: Alert    Hospitalization in the last 30 days (Readmission):  No    If yes, Readmission Assessment in  Navigator will be completed.    Advance Directives:      Code Status: Full Code   Patient's Primary Decision Maker is: Legal Next of Kin    Primary Decision Maker: Phylicia Pride  Child - [phone]    Primary Decision Maker: VICKIEANNA - Child - [phone]    Discharge Planning:    Patient lives with: Alone Type of Home: House  Primary Care Giver: Self  Patient Support Systems include: Children, Family Members   Current Financial resources: Medicare  Current community resources: None  Current services prior to admission: Durable Medical Equipment, Emergency Call  System            Current DME: Cane, Walker            Type of Home Care services:  None    ADLS  Prior functional level: Independent in ADLs/IADLs  Current functional level: Assistance with the following:, Mobility, Shopping, Housework    PT AM-PAC: 17 /24  OT AM-PAC: 17 /24    Family can provide assistance at DC: No  Would you like Case Management to discuss the discharge

## 2025-04-12 LAB
ANION GAP SERPL CALCULATED.3IONS-SCNC: 9 MMOL/L (ref 3–16)
BUN SERPL-MCNC: 13 MG/DL (ref 7–20)
CALCIUM SERPL-MCNC: 9.4 MG/DL (ref 8.3–10.6)
CHLORIDE SERPL-SCNC: 106 MMOL/L (ref 99–110)
CO2 SERPL-SCNC: 26 MMOL/L (ref 21–32)
CREAT SERPL-MCNC: 0.7 MG/DL (ref 0.6–1.2)
GFR SERPLBLD CREATININE-BSD FMLA CKD-EPI: 84 ML/MIN/{1.73_M2}
GLUCOSE SERPL-MCNC: 89 MG/DL (ref 70–99)
MAGNESIUM SERPL-MCNC: 1.61 MG/DL (ref 1.8–2.4)
POTASSIUM SERPL-SCNC: 3.3 MMOL/L (ref 3.5–5.1)
SODIUM SERPL-SCNC: 141 MMOL/L (ref 136–145)

## 2025-04-12 PROCEDURE — 6370000000 HC RX 637 (ALT 250 FOR IP): Performed by: STUDENT IN AN ORGANIZED HEALTH CARE EDUCATION/TRAINING PROGRAM

## 2025-04-12 PROCEDURE — 94760 N-INVAS EAR/PLS OXIMETRY 1: CPT

## 2025-04-12 PROCEDURE — 36415 COLL VENOUS BLD VENIPUNCTURE: CPT

## 2025-04-12 PROCEDURE — 6360000002 HC RX W HCPCS: Performed by: HOSPITALIST

## 2025-04-12 PROCEDURE — 80048 BASIC METABOLIC PNL TOTAL CA: CPT

## 2025-04-12 PROCEDURE — 83735 ASSAY OF MAGNESIUM: CPT

## 2025-04-12 PROCEDURE — 6370000000 HC RX 637 (ALT 250 FOR IP): Performed by: HOSPITALIST

## 2025-04-12 PROCEDURE — 1200000000 HC SEMI PRIVATE

## 2025-04-12 PROCEDURE — 2500000003 HC RX 250 WO HCPCS: Performed by: HOSPITALIST

## 2025-04-12 RX ADMIN — BACITRACIN ZINC AND POLYMYXIN B SULFATE: 500; 10000 OINTMENT TOPICAL at 09:18

## 2025-04-12 RX ADMIN — ASPIRIN 81 MG: 81 TABLET, CHEWABLE ORAL at 09:05

## 2025-04-12 RX ADMIN — ALLOPURINOL 100 MG: 100 TABLET ORAL at 09:05

## 2025-04-12 RX ADMIN — ENOXAPARIN SODIUM 40 MG: 100 INJECTION SUBCUTANEOUS at 09:06

## 2025-04-12 RX ADMIN — SODIUM CHLORIDE, PRESERVATIVE FREE 10 ML: 5 INJECTION INTRAVENOUS at 09:11

## 2025-04-12 RX ADMIN — BACITRACIN ZINC AND POLYMYXIN B SULFATE: 500; 10000 OINTMENT TOPICAL at 20:35

## 2025-04-12 RX ADMIN — ALLOPURINOL 100 MG: 100 TABLET ORAL at 20:35

## 2025-04-12 RX ADMIN — SODIUM CHLORIDE, PRESERVATIVE FREE 10 ML: 5 INJECTION INTRAVENOUS at 09:08

## 2025-04-12 RX ADMIN — SODIUM CHLORIDE, PRESERVATIVE FREE 10 ML: 5 INJECTION INTRAVENOUS at 20:36

## 2025-04-12 ASSESSMENT — PAIN SCALES - GENERAL: PAINLEVEL_OUTOF10: 0

## 2025-04-12 NOTE — PLAN OF CARE
Problem: Discharge Planning  Goal: Discharge to home or other facility with appropriate resources  Outcome: Progressing  Flowsheets (Taken 4/12/2025 0039)  Discharge to home or other facility with appropriate resources:   Identify barriers to discharge with patient and caregiver   Arrange for needed discharge resources and transportation as appropriate   Identify discharge learning needs (meds, wound care, etc)     Problem: Pain  Goal: Verbalizes/displays adequate comfort level or baseline comfort level  Outcome: Progressing     Problem: Safety - Adult  Goal: Free from fall injury  Outcome: Progressing     Problem: ABCDS Injury Assessment  Goal: Absence of physical injury  Outcome: Progressing

## 2025-04-12 NOTE — CARE COORDINATION
SW received call back from [facility] stating that they can accept.     CHOLO called sister Phylicia and they want her to rehab at this facility.     Marsha will start the precert.     HENS is completed. Document ID number is   [id number] It is now uploaded in epic for review.     Avoidable day entered for auth delay.     Respectfully submitted,    RILEY Padron  [facility]   [phone]    Electronically signed by MARTINEZ Mcdonald, LITZY on 4/12/2025 at 11:53 AM

## 2025-04-12 NOTE — CARE COORDINATION
SW reached out to Marsha Dent at [facility] to check on the status of referral sent yesterday afternoon. For future reference her phone number is [phone].    We will reach out to sister Nisha for facility preference after we hear from both facilities.     Respectfully submitted,     Ksenia HENRIQUEZ, RILEY  [facility]   [phone]    Electronically signed by MARTINEZ Mcdonald, LITZY on 4/12/2025 at 10:46 AM

## 2025-04-12 NOTE — DISCHARGE SUMMARY
acute intracranial hemorrhage, mass effect, or midline shift. No abnormal extra-axial fluid collection.  The gray-white differentiation is maintained without evidence of an acute infarct. Mild-moderate parenchymal volume loss and chronic small vessel ischemic changes no hydrocephalus. ORBITS: The globes are intact.  No orbital fracture seen.  No retrobulbar hematoma. SINUSES: Partial opacification of the left ethmoid air cells.  Mucosal thickening of the inferior maxillary sinuses, right greater than left. CERVICAL SPINE:  No acute fracture.  Minimal anterolisthesis of C7 on T1. Advanced degenerative disc disease and bilateral facet arthropathy. SOFT TISSUES: No scalp hematoma.  Left periorbital soft tissue swelling. BONES:  No acute calvarial or maxillofacial fracture seen.     No acute intracranial or cervical spine abnormality.  No acute calvarial or maxillofacial fracture.  Left periorbital soft tissue swelling.       CBC:   Recent Labs     04/09/25  2007 04/10/25  0513   WBC 14.5* 11.1*   HGB 12.6 10.6*    194     BMP:    Recent Labs     04/10/25  0513 04/11/25  0540 04/12/25  0510    140 141   K 3.2* 3.8 3.3*    109 106   CO2 20* 22 26   BUN 15 16 13   CREATININE 0.7 0.7 0.7   GLUCOSE 100* 91 89     Hepatic:   Recent Labs     04/09/25  2007 04/10/25  0513   AST 35 56*   ALT 10 13   BILITOT 0.4 0.3   ALKPHOS 97 89     Lipids: No results found for: \"CHOL\", \"HDL\", \"TRIG\"  Hemoglobin A1C: No results found for: \"LABA1C\"  TSH:   Lab Results   Component Value Date/Time    TSH 0.28 07/10/2021 04:48 AM     Troponin: No results found for: \"TROPONINT\"  Lactic Acid:   Recent Labs     04/10/25  0513   LACTA 1.0     BNP: No results for input(s): \"PROBNP\" in the last 72 hours.  UA:  Lab Results   Component Value Date/Time    NITRU Negative 04/11/2025 04:25 PM    COLORU Yellow 04/11/2025 04:25 PM    PHUR 5.5 04/11/2025 04:25 PM    PHUR 6.5 07/09/2021 01:13 PM    WBCUA 0 04/11/2025 04:25 PM    RBCUA 1

## 2025-04-12 NOTE — CARE COORDINATION
Discharge order noted. SW received a call back from Marsha at [facility] advising that they received the referral but were waiting on a response back from insurance. She will check and call us back with an answer.    Respectfully submitted,    Ksenia HENRIQUEZ, RILEY  [facility]   [phone]    Electronically signed by MARTINEZ Mcdonald, LITZY on 4/12/2025 at 11:32 AM

## 2025-04-12 NOTE — CARE COORDINATION
CHLOO reached out to Veronica at [facility] to check on the status of referral sent yesterday afternoon. For future reference her phone number M-F is [phone]. CHOLO left message on the weekend at [phone].     Respectfully submitted,    RILEY Padron  [facility]   [phone]    Electronically signed by MARTINEZ Mcdonald, GARYW on 4/12/2025 at 10:43 AM

## 2025-04-13 LAB
ANION GAP SERPL CALCULATED.3IONS-SCNC: 9 MMOL/L (ref 3–16)
BACTERIA BLD CULT: NORMAL
BUN SERPL-MCNC: 15 MG/DL (ref 7–20)
CALCIUM SERPL-MCNC: 9.5 MG/DL (ref 8.3–10.6)
CHLORIDE SERPL-SCNC: 105 MMOL/L (ref 99–110)
CO2 SERPL-SCNC: 27 MMOL/L (ref 21–32)
CREAT SERPL-MCNC: 0.7 MG/DL (ref 0.6–1.2)
GFR SERPLBLD CREATININE-BSD FMLA CKD-EPI: 84 ML/MIN/{1.73_M2}
GLUCOSE SERPL-MCNC: 91 MG/DL (ref 70–99)
MAGNESIUM SERPL-MCNC: 1.61 MG/DL (ref 1.8–2.4)
POTASSIUM SERPL-SCNC: 3.3 MMOL/L (ref 3.5–5.1)
SODIUM SERPL-SCNC: 141 MMOL/L (ref 136–145)

## 2025-04-13 PROCEDURE — 2500000003 HC RX 250 WO HCPCS: Performed by: HOSPITALIST

## 2025-04-13 PROCEDURE — 1200000000 HC SEMI PRIVATE

## 2025-04-13 PROCEDURE — 6360000002 HC RX W HCPCS: Performed by: HOSPITALIST

## 2025-04-13 PROCEDURE — 6370000000 HC RX 637 (ALT 250 FOR IP): Performed by: STUDENT IN AN ORGANIZED HEALTH CARE EDUCATION/TRAINING PROGRAM

## 2025-04-13 PROCEDURE — 36415 COLL VENOUS BLD VENIPUNCTURE: CPT

## 2025-04-13 PROCEDURE — 80048 BASIC METABOLIC PNL TOTAL CA: CPT

## 2025-04-13 PROCEDURE — 6370000000 HC RX 637 (ALT 250 FOR IP): Performed by: HOSPITALIST

## 2025-04-13 PROCEDURE — 94760 N-INVAS EAR/PLS OXIMETRY 1: CPT

## 2025-04-13 PROCEDURE — 6360000002 HC RX W HCPCS: Performed by: STUDENT IN AN ORGANIZED HEALTH CARE EDUCATION/TRAINING PROGRAM

## 2025-04-13 PROCEDURE — 83735 ASSAY OF MAGNESIUM: CPT

## 2025-04-13 PROCEDURE — 2580000003 HC RX 258: Performed by: HOSPITALIST

## 2025-04-13 RX ORDER — MAGNESIUM SULFATE 1 G/100ML
1000 INJECTION INTRAVENOUS ONCE
Status: COMPLETED | OUTPATIENT
Start: 2025-04-13 | End: 2025-04-13

## 2025-04-13 RX ADMIN — MAGNESIUM SULFATE HEPTAHYDRATE 1000 MG: 1 INJECTION, SOLUTION INTRAVENOUS at 10:14

## 2025-04-13 RX ADMIN — BACITRACIN ZINC AND POLYMYXIN B SULFATE: 500; 10000 OINTMENT TOPICAL at 09:44

## 2025-04-13 RX ADMIN — BACITRACIN ZINC AND POLYMYXIN B SULFATE: 500; 10000 OINTMENT TOPICAL at 20:52

## 2025-04-13 RX ADMIN — POTASSIUM BICARBONATE 40 MEQ: 782 TABLET, EFFERVESCENT ORAL at 10:04

## 2025-04-13 RX ADMIN — ALLOPURINOL 100 MG: 100 TABLET ORAL at 20:51

## 2025-04-13 RX ADMIN — SODIUM CHLORIDE, PRESERVATIVE FREE 10 ML: 5 INJECTION INTRAVENOUS at 09:48

## 2025-04-13 RX ADMIN — ASPIRIN 81 MG: 81 TABLET, CHEWABLE ORAL at 09:43

## 2025-04-13 RX ADMIN — SODIUM CHLORIDE: 0.9 INJECTION, SOLUTION INTRAVENOUS at 10:12

## 2025-04-13 RX ADMIN — ALLOPURINOL 100 MG: 100 TABLET ORAL at 09:43

## 2025-04-13 RX ADMIN — ENOXAPARIN SODIUM 40 MG: 100 INJECTION SUBCUTANEOUS at 09:43

## 2025-04-13 RX ADMIN — SODIUM CHLORIDE, PRESERVATIVE FREE 10 ML: 5 INJECTION INTRAVENOUS at 20:52

## 2025-04-13 ASSESSMENT — PAIN SCALES - GENERAL: PAINLEVEL_OUTOF10: 0

## 2025-04-13 NOTE — CARE COORDINATION
04/13/25 0817   Avoidable Days   Payor SNF Auth     Pending [employer] Precert.     Respectfully submitted,    Ksenia HENRIQUEZ, RILEY  [facility]   [phone]    Electronically signed by MARTINEZ Mcdonald, GARYW on 4/13/2025 at 8:18 AM

## 2025-04-14 VITALS
RESPIRATION RATE: 25 BRPM | DIASTOLIC BLOOD PRESSURE: 64 MMHG | TEMPERATURE: 98.1 F | HEART RATE: 79 BPM | HEIGHT: 62 IN | WEIGHT: 141.31 LBS | SYSTOLIC BLOOD PRESSURE: 128 MMHG | BODY MASS INDEX: 26.01 KG/M2 | OXYGEN SATURATION: 94 %

## 2025-04-14 LAB — BACTERIA BLD CULT ORG #2: NORMAL

## 2025-04-14 PROCEDURE — 97530 THERAPEUTIC ACTIVITIES: CPT

## 2025-04-14 PROCEDURE — 97535 SELF CARE MNGMENT TRAINING: CPT

## 2025-04-14 PROCEDURE — 6370000000 HC RX 637 (ALT 250 FOR IP): Performed by: STUDENT IN AN ORGANIZED HEALTH CARE EDUCATION/TRAINING PROGRAM

## 2025-04-14 PROCEDURE — 94760 N-INVAS EAR/PLS OXIMETRY 1: CPT

## 2025-04-14 PROCEDURE — 6370000000 HC RX 637 (ALT 250 FOR IP): Performed by: HOSPITALIST

## 2025-04-14 PROCEDURE — 6360000002 HC RX W HCPCS: Performed by: HOSPITALIST

## 2025-04-14 RX ADMIN — ASPIRIN 81 MG: 81 TABLET, CHEWABLE ORAL at 08:54

## 2025-04-14 RX ADMIN — ENOXAPARIN SODIUM 40 MG: 100 INJECTION SUBCUTANEOUS at 08:54

## 2025-04-14 RX ADMIN — ACETAMINOPHEN 650 MG: 325 TABLET ORAL at 05:15

## 2025-04-14 RX ADMIN — LEVOTHYROXINE SODIUM 88 MCG: 0.09 TABLET ORAL at 05:10

## 2025-04-14 RX ADMIN — BACITRACIN ZINC AND POLYMYXIN B SULFATE: 500; 10000 OINTMENT TOPICAL at 08:54

## 2025-04-14 RX ADMIN — ALLOPURINOL 100 MG: 100 TABLET ORAL at 08:54

## 2025-04-14 ASSESSMENT — PAIN SCALES - WONG BAKER: WONGBAKER_NUMERICALRESPONSE: NO HURT

## 2025-04-14 ASSESSMENT — PAIN - FUNCTIONAL ASSESSMENT: PAIN_FUNCTIONAL_ASSESSMENT: ACTIVITIES ARE NOT PREVENTED

## 2025-04-14 ASSESSMENT — PAIN SCALES - GENERAL: PAINLEVEL_OUTOF10: 4

## 2025-04-14 ASSESSMENT — PAIN DESCRIPTION - LOCATION: LOCATION: KNEE

## 2025-04-14 ASSESSMENT — PAIN DESCRIPTION - DESCRIPTORS: DESCRIPTORS: ACHING

## 2025-04-14 ASSESSMENT — PAIN DESCRIPTION - ORIENTATION: ORIENTATION: LEFT

## 2025-04-14 NOTE — PLAN OF CARE
Problem: Discharge Planning  Goal: Discharge to home or other facility with appropriate resources  4/13/2025 2035 by Mushtaq Smith RN  Outcome: Progressing  Flowsheets (Taken 4/13/2025 2021)  Discharge to home or other facility with appropriate resources:   Identify barriers to discharge with patient and caregiver   Arrange for needed discharge resources and transportation as appropriate   Identify discharge learning needs (meds, wound care, etc)  4/13/2025 1826 by Birdie Landaverde, RN  Outcome: Adequate for Discharge     Problem: Pain  Goal: Verbalizes/displays adequate comfort level or baseline comfort level  4/13/2025 2035 by Mushtaq Smith RN  Outcome: Progressing  4/13/2025 1826 by Birdie Landaverde RN  Outcome: Adequate for Discharge     Problem: Safety - Adult  Goal: Free from fall injury  4/13/2025 2035 by Mushtaq Smith RN  Outcome: Progressing  4/13/2025 1826 by Birdie Landaverde RN  Outcome: Adequate for Discharge     Problem: ABCDS Injury Assessment  Goal: Absence of physical injury  4/13/2025 2035 by Mushtaq Smith RN  Outcome: Progressing  4/13/2025 1826 by Birdie Landaverde RN  Outcome: Adequate for Discharge

## 2025-04-14 NOTE — PROGRESS NOTES
V2.0    [facility] [place]      Name:  Xena Jimenez /Age/Sex: 1940  (84 y.o. female)   MRN & CSN:  [id number] & [id number] Encounter Date/Time: 2025 2:33 PM EDT   Location:  [id number]/5271-01 PCP: Jessy Morales MD     Attending:Darien Mcclain DO       Hospital Day: 3  Brief Hospital Course  Xena Jimenez is a 84 y.o. female with pertinent PMHx of dermatomyositis, PVD, sarcoidosis, hypothyroidism who presented complaining of fall at home for which she was unable to get up and reports being on the ground for approximately 24 hours.  In the ED she had SVT which was aborted with adenosine.  Assessment & Plan     Rhabdomyolysis, improved  - Secondary mechanical fall, was laying on left arm for approximately 24 hours  - CK peaked yesterday evening, has been downtrending  - Discontinue IV fluids as she is becoming fluid overloaded  - Renal function has remained within normal limits  - IV Lasix 40 mg x 1 today    Mechanical fall  - PT and OT evaluation  - Lives at home alone  - May benefit from SNF placement versus home care, discussed with case management today    Asymptomatic bacteriuria  - Reports being started on antibiotic 3 days ago for UTI based off urine test by PCP however she reports she never had any symptoms and continues to deny any urinary symptoms  - Antibiotics discontinued  - Remains asymptomatic    Paroxysmal SVT  - S/p adenosine x 1 in the ED  - Asymptomatic  - Monitor on telemetry, no further events have been observed    Hypothyroidism  - Continue Synthroid    Independent interpretation of telemetry strip today  showing normal sinus rhythm    Diet ADULT DIET; Regular   DVT Prophylaxis [x] Lovenox, []  Heparin, [] SCDs, [] Ambulation,  [] Eliquis, [] Xarelto  [] Coumadin   Code Status Full Code   Disposition From: Home  Expected Disposition: TBD  Estimated Date of Discharge:            Subjective:     Chief Complaint: Fall    Patient was seen and examined today 
    V2.0    [facility] [place]      Name:  Xena Jimenez /Age/Sex: 1940  (84 y.o. female)   MRN & CSN:  [id number] & [id number] Encounter Date/Time: 4/10/2025 2:33 PM EDT   Location:  [id number] PCP: Jessy Morales MD     Attending:Darien Mcclain DO       Hospital Day: 2  Brief Hospital Course  Xena Jimenez is a 84 y.o. female with pertinent PMHx of dermatomyositis, PVD, sarcoidosis, hypothyroidism who presented complaining of fall at home for which she was unable to get up and reports being on the ground for approximately 24 hours.  In the ED she had SVT which was aborted with adenosine.  Assessment & Plan     Rhabdomyolysis  - Secondary mechanical fall  - Trend CK  - IV fluid resuscitation  - Check renal function in the morning    Mechanical fall  - PT and OT evaluation  - Lives at home alone    Asymptomatic bacteriuria  - Reports being started on antibiotic 3 days ago for UTI based off urine test by PCP however she reports she never had any symptoms and continues to deny any urinary symptoms  - Will discontinue antibiotics    Paroxysmal SVT  - S/p adenosine x 1 in the ED  - Asymptomatic  - Monitor on telemetry    Hypothyroidism  - Continue Synthroid    Diet ADULT DIET; Regular   DVT Prophylaxis [x] Lovenox, []  Heparin, [] SCDs, [] Ambulation,  [] Eliquis, [] Xarelto  [] Coumadin   Code Status Full Code   Disposition From: Home  Expected Disposition: TBD  Estimated Date of Discharge: -           Subjective:     Chief Complaint: Fall    Patient was seen and examined today sitting up in chair in room  Not complaining of any pain currently  Says she remembers the fall just fell and tripped, was lying on her left side believes it to be about 24 hours  Denies any recent illness  Says she feels like she is urinating normally  Denies any dysuria or hematuria      Review of Systems:      Pertinent positives and negatives discussed in HPI    Objective:     Intake/Output Summary (Last 
    V2.0    [facility] [place]      Name:  Xena Jimenez /Age/Sex: 1940  (84 y.o. female)   MRN & CSN:  [id number] & [id number] Encounter Date/Time: 2025 2:10 PM EDT   Location:  [id number] PCP: Jessy Morales MD     Attending:Darien Mcclain DO       Hospital Day: 6  Brief Hospital Course  Xena Jimenez is a 84 y.o. female with pertinent PMHx of dermatomyositis, PVD, sarcoidosis, hypothyroidism who presented complaining of fall at home for which she was unable to get up and reports being on the ground for approximately 24 hours laying on her left arm.  In the ED she was found to be in SVT which was aborted with IV adenosine.  Her labs were consistent with rhabdomyolysis and she was admitted for further evaluation and care  Assessment & Plan     Traumatic rhabdomyolysis, resolved  - Secondary to mechanical fall  - S/p IV fluid resuscitation    Paroxysmal SVT  - S/p IV adenosine x 1 in the ED  - Does not appear to have had any further episodes since admission, remains asymptomatic at this time, independent interpretation of telemetry strip  showing normal sinus rhythm    Mechanical fall  - PT and OT recommending continued therapy  - SNF placement pending    Left brow laceration  - 6-0 nylon sutures placed in the ED on  recommended removal in 5 to 7 days      Diet ADULT DIET; Regular   DVT Prophylaxis [x] Lovenox, []  Heparin, [] SCDs, [] Ambulation,  [] Eliquis, [] Xarelto  [] Coumadin   Code Status Full Code   Disposition From: Home  Expected Disposition: SNF  Estimated Date of Discharge: Medically stable for discharge pending placement           Subjective:     Chief Complaint: Fall    Patient was seen and examined today sitting up in chair in room  Reports she feels well, denies any pain currently  Says she is making good urine  Just feels a little bit weak  Denies any palpitations or chest pain      Review of Systems:      Pertinent positives and negatives discussed in 
  [facility] - Occupational Therapy   Phone: [phone]    Occupational Therapy  Facility/Department:[address] PROGRESSIVE CARE    [x] Initial Evaluation            [x] Daily Treatment Note         [] Discharge Summary      Patient: Xena Jimenez   : 1940   MRN: [id number]   Date of Service:  4/10/2025    Staff Mobility Recommendation: stedy x1    AM-PAC Score: 17/24  Xena Jimenez scored a 17/24 on the AM-PAC ADL Inpatient form. Current research shows that an AM-PAC score of 17 or less is typically not associated with a discharge to the patient's home setting. Based on the patient's AM-PAC score and their current ADL deficits, it is recommended that the patient have 5-7 sessions per week of Occupational Therapy at d/c to increase the patient's independence.  At this time, this patient demonstrates complex nursing, medical, and rehabilitative needs, and would benefit from intensive rehabilitation services upon discharge from the Inpatient setting.  This patient demonstrates the ability to participate in and benefit from an intensive therapy program with a coordinated interdisciplinary team approach to foster frequent, structured, and documented communication among disciplines, who will work together to establish, prioritize, and achieve treatment goals. Please see assessment section for further patient specific details.    If patient discharges prior to next session this note will serve as a discharge summary.  Please see below for the latest assessment towards goals.     Discharge Recommendations: ARU, continue to assess    Admitting Diagnosis:  Sepsis (HCC)  Ordering Physician: Darien Mcclain DO   Current Admission Summary: per MD note, 84f presents from home where she was noted to have fallen. Patient denies chest pain, palpitations, lightheadedness or dizziness. She remembers losing her balance and falling, but denies LOC. She is unsure when she fell, but states she was unable to stand secondary 
  [facility] - Physical Therapy   Phone: [phone]    Physical Therapy  Facility/Department:[address] PROGRESSIVE CARE    [x] Initial Evaluation            [] Daily Treatment Note         [] Discharge Summary      Patient: Xena Jimenez   : 1940   MRN: [id number]   Date of Service:  2025  Staff Mobility Recommendation: RW x 1    AM-PAC score: 17/24  Discharge Recommendations: ARU    Xena Jimenez scored a 17/24 on the AM-PAC short mobility form. Current research shows that an AM-PAC score of 17 or less is typically not associated with a discharge to the patient's home setting. Based on the patient's AM-PAC score and their current functional mobility deficits, it is recommended that the patient have 5-7 sessions per week of Physical Therapy at d/c to increase the patient's independence.  At this time, this patient demonstrates complex nursing, medical, and rehabilitative needs, and would benefit from intensive rehabilitation services upon discharge from the Inpatient setting.  This patient demonstrates the ability to participate in and benefit from an intensive therapy program with a coordinated interdisciplinary team approach to foster frequent, structured, and documented communication among disciplines, who will work together to establish, prioritize, and achieve treatment goals. Please see assessment section for further patient specific details.    If patient discharges prior to next session this note will serve as a discharge summary.  Please see below for the latest assessment towards goals.      Admitting Diagnosis: Sepsis (HCC)  Ordering Physician: Darien Mcclain DO   Current Admission Summary: Pt is an 84 y.o. male who presented to the ED on 25 with fall.    Past Medical History:  has no past medical history on file.  Past Surgical History:  has no past surgical history on file.    Assessment  Activity Tolerance: Fair  Impairments Requiring Therapeutic Intervention: decreased 
  [facility] - Physical Therapy   Phone: [phone]    Physical Therapy  Facility/Department:[address] PROGRESSIVE CARE    [] Initial Evaluation            [x] Daily Treatment Note         [] Discharge Summary      Patient: Xena Jimenez   : 1940   MRN: [id number]   Date of Service:  2025  Staff Mobility Recommendation: RW x 1    AM-PAC score: 17/24  Discharge Recommendations: ARU    Xena Jimenez scored a 17/24 on the AM-PAC short mobility form. Current research shows that an AM-PAC score of 17 or less is typically not associated with a discharge to the patient's home setting. Based on the patient's AM-PAC score and their current functional mobility deficits, it is recommended that the patient have 5-7 sessions per week of Physical Therapy at d/c to increase the patient's independence.  At this time, this patient demonstrates complex nursing, medical, and rehabilitative needs, and would benefit from intensive rehabilitation services upon discharge from the Inpatient setting.  This patient demonstrates the ability to participate in and benefit from an intensive therapy program with a coordinated interdisciplinary team approach to foster frequent, structured, and documented communication among disciplines, who will work together to establish, prioritize, and achieve treatment goals. Please see assessment section for further patient specific details.    If patient discharges prior to next session this note will serve as a discharge summary.  Please see below for the latest assessment towards goals.      Admitting Diagnosis: Sepsis (HCC)  Ordering Physician: Darien Mcclain DO   Current Admission Summary: Pt is an 84 y.o. male who presented to the ED on 25 with fall.    Past Medical History:  has no past medical history on file.  Past Surgical History:  has no past surgical history on file.    Assessment  Activity Tolerance: Fair  Impairments Requiring Therapeutic Intervention: decreased 
  Physician Progress Note      PATIENT:               GUERO MENDOZA  CSN #:                  [id number]  :                       1940  ADMIT DATE:       2025 7:15 PM  DISCH DATE:  RESPONDING  PROVIDER #:        Darien Mcclain DO          QUERY TEXT:    Sepsis was documented in the medical record H&P.  The diagnosis was not noted   in subsequent documentation.  Please clarify the status of this condition.    The clinical indicators include:  85 yo w/    Per the H&P: UTI / sepsis -continue IV rocephin IVFs, trend lactic acid on   tele monitor.  WBC 14.5, HR 98, RR 21, Procalcitonin 4.9  Options provided:  -- Sepsis due to UTI confirmed after study  -- Sepsis due to UTI treated and resolved  -- Sepsis ruled out after study  -- Other - I will add my own diagnosis  -- Disagree - Not applicable / Not valid  -- Disagree - Clinically unable to determine / Unknown  -- Refer to Clinical Documentation Reviewer    PROVIDER RESPONSE TEXT:    Sepsis ruled out after study.    Query created by: Faviola Goodman on 2025 5:23 AM      Electronically signed by:  Darien Mcclain DO 2025 3:16 PM          
  Physician Progress Note      PATIENT:               GUERO MENDOZA  CSN #:                  [id number]  :                       1940  ADMIT DATE:       2025 7:15 PM  DISCH DATE:  RESPONDING  PROVIDER #:        Darien Mcclain DO          QUERY TEXT:    Rhabdomyolysis is documented in the medical record in the H&P.  Please specify   the type:    The clinical indicators include:  83 yo S/P fall    Per the H&P: Per ems pt was on the floor for 24 hrs.   - 867.    Lab monitoring, IVF bolus, continuous IVF  Options provided:  -- Traumatic rhabdomyolysis  -- Other - I will add my own diagnosis  -- Disagree - Not applicable / Not valid  -- Disagree - Clinically unable to determine / Unknown  -- Refer to Clinical Documentation Reviewer    PROVIDER RESPONSE TEXT:    This patient has traumatic rhabdomyolysis.    Query created by: Faviola Goodman on 4/10/2025 8:36 AM      Electronically signed by:  Darien Mcclain DO 2025 4:07 PM          
  [facility] - Occupational Therapy   Phone: [phone]    Occupational Therapy  Facility/Department:[address] PROGRESSIVE CARE    [] Initial Evaluation            [x] Daily Treatment Note         [] Discharge Summary      Patient: Xena Jimenez   : 1940   MRN: [id number]   Date of Service:  2025    Staff Mobility Recommendation: RW x1    AM-PAC Score: 17/24  Xena Jimenez scored a 17/24 on the AM-PAC ADL Inpatient form. Current research shows that an AM-PAC score of 17 or less is typically not associated with a discharge to the patient's home setting. Based on the patient's AM-PAC score and their current ADL deficits, it is recommended that the patient have 5-7 sessions per week of Occupational Therapy at d/c to increase the patient's independence.  At this time, this patient demonstrates complex nursing, medical, and rehabilitative needs, and would benefit from intensive rehabilitation services upon discharge from the Inpatient setting.  This patient demonstrates the ability to participate in and benefit from an intensive therapy program with a coordinated interdisciplinary team approach to foster frequent, structured, and documented communication among disciplines, who will work together to establish, prioritize, and achieve treatment goals. Please see assessment section for further patient specific details.    If patient discharges prior to next session this note will serve as a discharge summary.  Please see below for the latest assessment towards goals.     Discharge Recommendations: ARU, continue to assess    Admitting Diagnosis:  Sepsis (HCC)  Ordering Physician: Darien Mcclain DO   Current Admission Summary: per MD note, 84f presents from home where she was noted to have fallen. Patient denies chest pain, palpitations, lightheadedness or dizziness. She remembers losing her balance and falling, but denies LOC. She is unsure when she fell, but states she was unable to stand secondary to 
4 Eyes Skin Assessment     NAME:  Xena Jimenez  YOB: 1940  MEDICAL RECORD NUMBER:  [id number]    The patient is being assessed for  Admission    I agree that at least one RN has performed a thorough Head to Toe Skin Assessment on the patient. ALL assessment sites listed below have been assessed.      Areas assessed by both nurses:    Head, Face, Ears and Shoulders, Back, Chest, skin folds, buttocks, groin, heels, toes. Left face with hematoma and sutures above left eye, Right foot with bruised areas between every toe. Both heels with blanchable redness.         Does the Patient have a Wound? Yes wound(s) were present on assessment. LDA wound assessment was Initiated and completed by RN       Tim Prevention initiated by RN: Yes  Wound Care Orders initiated by RN: Yes    Pressure Injury (Stage 3,4, Unstageable, DTI, NWPT, and Complex wounds) if present, place Wound referral order by RN under : No    New Ostomies, if present place, Ostomy referral order under : No     Nurse 1 eSignature: Electronically signed by Abigail Galvin RN on 4/10/25 at 6:34 AM EDT    **SHARE this note so that the co-signing nurse can place an eSignature**    Nurse 2 eSignature: Electronically signed by Mendoza Cheung RN on 4/10/25 at 6:37 AM EDT   
Pt discharged at this time. Reviewed all discharge instructions and follow up appts with patient. Patient states that she understands. Ivs removed at this time without complications. Pt does not voice any questions or concerns at this time. Belongings in hand. Patient assisted to vehicle at this time. Patient tolerated well.    
Spiritual Health History and Assessment/Progress Note  [facility]    Spiritual/Emotional Needs,  ,  ,      Name: Xena Jimenez MRN: [id number]    Age: 84 y.o.     Sex: female   Language: English   Congregational: [employer]   Sepsis (HCC)     Date: 4/11/2025            Total Time Calculated: 13 min              Spiritual Assessment began in [facility] PROGRESSIVE CARE        Referral/Consult From: Friend   Encounter Overview/Reason: Spiritual/Emotional Needs  Service Provided For: Patient    Елена, Belief, Meaning:   Patient is connected with a елена tradition or spiritual practice  Family/Friends are connected with a елена tradition or spiritual practice      Importance and Influence:  Patient has spiritual/personal beliefs that influence decisions regarding their health  Family/Friends have spiritual/personal beliefs that influence decisions regarding the patient's health    Community:  Patient is connected with a spiritual community and feels well-supported. Support system includes: Children, Friends, and Extended family  Family/Friends feel well-supported. Support system includes: Parent/s, Friends, and Extended family    Assessment and Plan of Care:     Patient Interventions include: provided blessing  Family/Friends Interventions include: Other: provided blessing    Patient Plan of Care: Spiritual Care available upon further referral  Family/Friends Plan of Care: Spiritual Care available upon further referral    Electronically signed by Chaplain Larisa on 4/11/2025 at 3:09 PM   
RW  Stand to sit transfer: contact guard assistance  Toilet transfer: contact guard assistance  Toilet transfer equipment: grab bars  Comments: cues for hand placement  Functional Mobility  Functional Mobility Activity: to/from bathroom  Device Use: rolling walker  Required Assistance: contact guard assistance  Comments: Pt required cues for walker safety.   Balance:  Static Sitting Balance: good(+): independent with high level dynamic balance in unsupported position  Dynamic Sitting Balance: good(+): independent with high level dynamic balance in unsupported position  Static Standing Balance: fair (-): maintains balance at CGA with use of UE support  Dynamic Standing Balance: fair (-): maintains balance at CGA with use of UE support  Comments:     Cognition  WFL  Orientation:    alert and oriented x 4     Education  Barriers To Learning: none  Patient Education: patient educated on goals, OT role and benefits, plan of care, ADL adaptive strategies, transfer training  Learning Assessment:  patient verbalizes understanding, would benefit from continued reinforcement  Safety Interventions: patient at risk for falls, call light within reach, patient left in chair, chair alarm in place, gait belt, and nurse notified    Plan  Frequency: 3-5 x/week  Current Treatment Recommendations: strengthening, balance training, functional mobility training, transfer training, gait training, endurance training, patient/caregiver education, ADL/self-care training, safety education, and equipment evaluation/education    Goals  Patient Goals: return to PLOF   Short Term Goals:  Time Frame: By acute discharge  Patient will complete lower body dressing with modified independent   Patient will complete upper body dressing with modified independent  Patient will complete toileting with modified independent  Patient will complete bathing with modified independent  Patient will complete grooming with modified independent  Patient will complete 
  Component Value Date/Time    TSH 0.28 07/10/2021 04:48 AM     Troponin: No results found for: \"TROPONINT\"  Lactic Acid: No results for input(s): \"LACTA\" in the last 72 hours.  BNP: No results for input(s): \"PROBNP\" in the last 72 hours.  UA:  Lab Results   Component Value Date/Time    NITRU Negative 04/11/2025 04:25 PM    COLORU Yellow 04/11/2025 04:25 PM    PHUR 5.5 04/11/2025 04:25 PM    PHUR 6.5 07/09/2021 01:13 PM    WBCUA 0 04/11/2025 04:25 PM    RBCUA 1 04/11/2025 04:25 PM    MUCUS Rare 04/09/2025 08:08 PM    BACTERIA None Seen 04/11/2025 04:25 PM    CLARITYU Clear 04/11/2025 04:25 PM    LEUKOCYTESUR Negative 04/11/2025 04:25 PM    UROBILINOGEN 0.2 04/11/2025 04:25 PM    BILIRUBINUR Negative 04/11/2025 04:25 PM    BLOODU TRACE 04/11/2025 04:25 PM    GLUCOSEU Negative 04/11/2025 04:25 PM    KETUA Negative 04/11/2025 04:25 PM     Urine Cultures: No results found for: \"LABURIN\"  Blood Cultures:   Lab Results   Component Value Date/Time    BC  04/09/2025 08:07 PM     No Growth to date.  Any change in status will be called.     Lab Results   Component Value Date/Time    BLOODCULT2  04/10/2025 12:44 AM     No Growth to date.  Any change in status will be called.     Organism: No results found for: \"ORG\"      Electronically signed by Darien Mcclain DO on 4/13/2025 at 2:10 PM

## 2025-04-14 NOTE — CARE COORDINATION
Per Marsha reveles/ Magdi Saab- approved through 4/15/2025  Will arrange transport   Electronically signed by Carly Hobbs on 4/14/2025 at 12:26 PM  #[phone]

## 2025-04-14 NOTE — CARE COORDINATION
Shima w/ Marsha reveles/ Magdi Saab #[phone]- precert remains pending      Electronically signed by Carly Hobbs on 4/14/2025 at 9:37 AM  #[phone]

## 2025-04-14 NOTE — CARE COORDINATION
DISCHARGE SUMMARY     DATE OF DISCHARGE: 4/14/2025    DISCHARGE DESTINATION: [facility]  FACILITY    Level of Care: Skilled  [facility]  [id number] [place] [address]  Phone: [phone]  Fax: [phone] (Wkdy)          [phone] (Wknd)      Precert Obtained: yes    Hens Completed: yes    PASARR: N/A    Notified:   , RN, Family, and Facility/Agency      TRANSPORTATION: [facility]    Company Name: [employer]     Time: 3:00PM    Phone Number: [phone]    Electronically signed by Carly Hobbs on 4/14/2025 at 1:00 PM  #[phone]

## 2025-04-30 ENCOUNTER — HOSPITAL ENCOUNTER (INPATIENT)
Age: 85
LOS: 5 days | Discharge: NURSING, REHAB OR LONG-TERM CARE | DRG: 641 | End: 2025-05-05
Attending: EMERGENCY MEDICINE | Admitting: INTERNAL MEDICINE
Payer: MEDICARE

## 2025-04-30 ENCOUNTER — APPOINTMENT (OUTPATIENT)
Dept: GENERAL RADIOLOGY | Age: 85
DRG: 641 | End: 2025-04-30
Payer: MEDICARE

## 2025-04-30 DIAGNOSIS — R41.0 INTERMITTENT CONFUSION: ICD-10-CM

## 2025-04-30 DIAGNOSIS — E83.52 HYPERCALCEMIA: Primary | ICD-10-CM

## 2025-04-30 PROBLEM — I10 PRIMARY HYPERTENSION: Status: ACTIVE | Noted: 2025-04-30

## 2025-04-30 PROBLEM — E21.5 PARATHYROID RELATED HYPERCALCEMIA: Status: RESOLVED | Noted: 2025-04-30 | Resolved: 2025-04-30

## 2025-04-30 PROBLEM — E21.5 PARATHYROID RELATED HYPERCALCEMIA: Status: ACTIVE | Noted: 2025-04-30

## 2025-04-30 LAB
25(OH)D3 SERPL-MCNC: 40.1 NG/ML
ALBUMIN SERPL-MCNC: 4.1 G/DL (ref 3.4–5)
ALBUMIN/GLOB SERPL: 1.2 {RATIO} (ref 1.1–2.2)
ALP SERPL-CCNC: 113 U/L (ref 40–129)
ALT SERPL-CCNC: 6 U/L (ref 10–40)
ANION GAP SERPL CALCULATED.3IONS-SCNC: 11 MMOL/L (ref 3–16)
AST SERPL-CCNC: 26 U/L (ref 15–37)
BACTERIA URNS QL MICRO: ABNORMAL /HPF
BASE EXCESS BLDV CALC-SCNC: 8.8 MMOL/L
BASOPHILS # BLD: 0.1 K/UL (ref 0–0.2)
BASOPHILS NFR BLD: 0.8 %
BILIRUB SERPL-MCNC: 0.3 MG/DL (ref 0–1)
BILIRUB UR QL STRIP.AUTO: NEGATIVE
BUN SERPL-MCNC: 22 MG/DL (ref 7–20)
CALCIUM SERPL-MCNC: 14.7 MG/DL (ref 8.3–10.6)
CHLORIDE SERPL-SCNC: 99 MMOL/L (ref 99–110)
CLARITY UR: ABNORMAL
CO2 BLDV-SCNC: 37 MMOL/L
CO2 SERPL-SCNC: 29 MMOL/L (ref 21–32)
COHGB MFR BLDV: 1.1 %
COLOR UR: YELLOW
CREAT SERPL-MCNC: 1 MG/DL (ref 0.6–1.2)
DEPRECATED RDW RBC AUTO: 15.8 % (ref 12.4–15.4)
EOSINOPHIL # BLD: 0.3 K/UL (ref 0–0.6)
EOSINOPHIL NFR BLD: 3.9 %
EPI CELLS #/AREA URNS AUTO: 7 /HPF (ref 0–5)
GFR SERPLBLD CREATININE-BSD FMLA CKD-EPI: 55 ML/MIN/{1.73_M2}
GLUCOSE SERPL-MCNC: 94 MG/DL (ref 70–99)
GLUCOSE UR STRIP.AUTO-MCNC: NEGATIVE MG/DL
HCO3 BLDV-SCNC: 35 MMOL/L (ref 23–29)
HCT VFR BLD AUTO: 40.3 % (ref 36–48)
HGB BLD-MCNC: 13.1 G/DL (ref 12–16)
HGB UR QL STRIP.AUTO: NEGATIVE
HYALINE CASTS #/AREA URNS AUTO: 0 /LPF (ref 0–8)
KETONES UR STRIP.AUTO-MCNC: NEGATIVE MG/DL
LACTATE BLDV-SCNC: 1.3 MMOL/L (ref 0.4–1.9)
LEUKOCYTE ESTERASE UR QL STRIP.AUTO: ABNORMAL
LIPASE SERPL-CCNC: 31 U/L (ref 13–60)
LYMPHOCYTES # BLD: 1.2 K/UL (ref 1–5.1)
LYMPHOCYTES NFR BLD: 15.2 %
MCH RBC QN AUTO: 28.9 PG (ref 26–34)
MCHC RBC AUTO-ENTMCNC: 32.5 G/DL (ref 31–36)
MCV RBC AUTO: 88.9 FL (ref 80–100)
METHGB MFR BLDV: 0.6 %
MONOCYTES # BLD: 1 K/UL (ref 0–1.3)
MONOCYTES NFR BLD: 12.3 %
NEUTROPHILS # BLD: 5.3 K/UL (ref 1.7–7.7)
NEUTROPHILS NFR BLD: 67.8 %
NITRITE UR QL STRIP.AUTO: NEGATIVE
NT-PROBNP SERPL-MCNC: 399 PG/ML (ref 0–449)
O2 THERAPY: ABNORMAL
PCO2 BLDV: 53.1 MMHG (ref 40–50)
PH BLDV: 7.43 [PH] (ref 7.35–7.45)
PH UR STRIP.AUTO: 6.5 [PH] (ref 5–8)
PLATELET # BLD AUTO: 274 K/UL (ref 135–450)
PMV BLD AUTO: 7.5 FL (ref 5–10.5)
PO2 BLDV: <30 MMHG
POTASSIUM SERPL-SCNC: 3.9 MMOL/L (ref 3.5–5.1)
PROT SERPL-MCNC: 7.6 G/DL (ref 6.4–8.2)
PROT UR STRIP.AUTO-MCNC: NEGATIVE MG/DL
PTH-INTACT SERPL-MCNC: 5.2 PG/ML (ref 14–72)
RBC # BLD AUTO: 4.53 M/UL (ref 4–5.2)
RBC CLUMPS #/AREA URNS AUTO: 0 /HPF (ref 0–4)
SAO2 % BLDV: 18 %
SODIUM SERPL-SCNC: 139 MMOL/L (ref 136–145)
SP GR UR STRIP.AUTO: 1.01 (ref 1–1.03)
TROPONIN, HIGH SENSITIVITY: 27 NG/L (ref 0–14)
TROPONIN, HIGH SENSITIVITY: 27 NG/L (ref 0–14)
UA COMPLETE W REFLEX CULTURE PNL UR: ABNORMAL
UA DIPSTICK W REFLEX MICRO PNL UR: YES
URN SPEC COLLECT METH UR: ABNORMAL
UROBILINOGEN UR STRIP-ACNC: 0.2 E.U./DL
WBC # BLD AUTO: 7.8 K/UL (ref 4–11)
WBC #/AREA URNS AUTO: 1 /HPF (ref 0–5)

## 2025-04-30 PROCEDURE — 84484 ASSAY OF TROPONIN QUANT: CPT

## 2025-04-30 PROCEDURE — 82803 BLOOD GASES ANY COMBINATION: CPT

## 2025-04-30 PROCEDURE — 80053 COMPREHEN METABOLIC PANEL: CPT

## 2025-04-30 PROCEDURE — 81001 URINALYSIS AUTO W/SCOPE: CPT

## 2025-04-30 PROCEDURE — 83690 ASSAY OF LIPASE: CPT

## 2025-04-30 PROCEDURE — 71045 X-RAY EXAM CHEST 1 VIEW: CPT

## 2025-04-30 PROCEDURE — 83970 ASSAY OF PARATHORMONE: CPT

## 2025-04-30 PROCEDURE — 6360000002 HC RX W HCPCS: Performed by: INTERNAL MEDICINE

## 2025-04-30 PROCEDURE — 83605 ASSAY OF LACTIC ACID: CPT

## 2025-04-30 PROCEDURE — 82542 COL CHROMOTOGRAPHY QUAL/QUAN: CPT

## 2025-04-30 PROCEDURE — 83880 ASSAY OF NATRIURETIC PEPTIDE: CPT

## 2025-04-30 PROCEDURE — 82306 VITAMIN D 25 HYDROXY: CPT

## 2025-04-30 PROCEDURE — 2060000000 HC ICU INTERMEDIATE R&B

## 2025-04-30 PROCEDURE — 85025 COMPLETE CBC W/AUTO DIFF WBC: CPT

## 2025-04-30 PROCEDURE — 2580000003 HC RX 258: Performed by: INTERNAL MEDICINE

## 2025-04-30 PROCEDURE — 36415 COLL VENOUS BLD VENIPUNCTURE: CPT

## 2025-04-30 PROCEDURE — 6370000000 HC RX 637 (ALT 250 FOR IP): Performed by: INTERNAL MEDICINE

## 2025-04-30 PROCEDURE — 93005 ELECTROCARDIOGRAM TRACING: CPT | Performed by: EMERGENCY MEDICINE

## 2025-04-30 PROCEDURE — 99285 EMERGENCY DEPT VISIT HI MDM: CPT

## 2025-04-30 PROCEDURE — 2580000003 HC RX 258: Performed by: EMERGENCY MEDICINE

## 2025-04-30 RX ORDER — M-VIT,TX,IRON,MINS/CALC/FOLIC 27MG-0.4MG
1 TABLET ORAL DAILY
Status: DISCONTINUED | OUTPATIENT
Start: 2025-05-01 | End: 2025-05-05 | Stop reason: HOSPADM

## 2025-04-30 RX ORDER — ONDANSETRON 4 MG/1
4 TABLET, ORALLY DISINTEGRATING ORAL EVERY 8 HOURS PRN
Status: DISCONTINUED | OUTPATIENT
Start: 2025-04-30 | End: 2025-05-05 | Stop reason: HOSPADM

## 2025-04-30 RX ORDER — POTASSIUM CHLORIDE 7.45 MG/ML
10 INJECTION INTRAVENOUS PRN
Status: DISCONTINUED | OUTPATIENT
Start: 2025-04-30 | End: 2025-05-05 | Stop reason: HOSPADM

## 2025-04-30 RX ORDER — SODIUM CHLORIDE 0.9 % (FLUSH) 0.9 %
5-40 SYRINGE (ML) INJECTION EVERY 12 HOURS SCHEDULED
Status: DISCONTINUED | OUTPATIENT
Start: 2025-04-30 | End: 2025-05-05 | Stop reason: HOSPADM

## 2025-04-30 RX ORDER — ALLOPURINOL 100 MG/1
100 TABLET ORAL 2 TIMES DAILY
Status: DISCONTINUED | OUTPATIENT
Start: 2025-04-30 | End: 2025-05-05 | Stop reason: HOSPADM

## 2025-04-30 RX ORDER — BISACODYL 5 MG/1
5 TABLET, DELAYED RELEASE ORAL DAILY PRN
Status: DISCONTINUED | OUTPATIENT
Start: 2025-04-30 | End: 2025-05-05 | Stop reason: HOSPADM

## 2025-04-30 RX ORDER — MAGNESIUM HYDROXIDE/ALUMINUM HYDROXICE/SIMETHICONE 120; 1200; 1200 MG/30ML; MG/30ML; MG/30ML
30 SUSPENSION ORAL EVERY 6 HOURS PRN
Status: DISCONTINUED | OUTPATIENT
Start: 2025-04-30 | End: 2025-05-05 | Stop reason: HOSPADM

## 2025-04-30 RX ORDER — LEVOTHYROXINE SODIUM 88 UG/1
88 TABLET ORAL
Status: DISCONTINUED | OUTPATIENT
Start: 2025-05-01 | End: 2025-05-05 | Stop reason: HOSPADM

## 2025-04-30 RX ORDER — ASPIRIN 81 MG/1
81 TABLET, CHEWABLE ORAL DAILY
Status: DISCONTINUED | OUTPATIENT
Start: 2025-05-01 | End: 2025-05-05 | Stop reason: HOSPADM

## 2025-04-30 RX ORDER — POTASSIUM CHLORIDE 1500 MG/1
40 TABLET, EXTENDED RELEASE ORAL PRN
Status: DISCONTINUED | OUTPATIENT
Start: 2025-04-30 | End: 2025-05-05 | Stop reason: HOSPADM

## 2025-04-30 RX ORDER — POLYETHYLENE GLYCOL 3350 17 G/17G
17 POWDER, FOR SOLUTION ORAL DAILY PRN
Status: DISCONTINUED | OUTPATIENT
Start: 2025-04-30 | End: 2025-05-05 | Stop reason: HOSPADM

## 2025-04-30 RX ORDER — MAGNESIUM SULFATE IN WATER 40 MG/ML
2000 INJECTION, SOLUTION INTRAVENOUS PRN
Status: DISCONTINUED | OUTPATIENT
Start: 2025-04-30 | End: 2025-05-05 | Stop reason: HOSPADM

## 2025-04-30 RX ORDER — ONDANSETRON 2 MG/ML
4 INJECTION INTRAMUSCULAR; INTRAVENOUS EVERY 6 HOURS PRN
Status: DISCONTINUED | OUTPATIENT
Start: 2025-04-30 | End: 2025-05-05 | Stop reason: HOSPADM

## 2025-04-30 RX ORDER — SODIUM CHLORIDE 0.9 % (FLUSH) 0.9 %
5-40 SYRINGE (ML) INJECTION PRN
Status: DISCONTINUED | OUTPATIENT
Start: 2025-04-30 | End: 2025-05-05 | Stop reason: HOSPADM

## 2025-04-30 RX ORDER — ACETAMINOPHEN 325 MG/1
650 TABLET ORAL EVERY 6 HOURS PRN
Status: DISCONTINUED | OUTPATIENT
Start: 2025-04-30 | End: 2025-05-05 | Stop reason: HOSPADM

## 2025-04-30 RX ORDER — 0.9 % SODIUM CHLORIDE 0.9 %
1000 INTRAVENOUS SOLUTION INTRAVENOUS ONCE
Status: COMPLETED | OUTPATIENT
Start: 2025-04-30 | End: 2025-04-30

## 2025-04-30 RX ORDER — SODIUM CHLORIDE, SODIUM LACTATE, POTASSIUM CHLORIDE, CALCIUM CHLORIDE 600; 310; 30; 20 MG/100ML; MG/100ML; MG/100ML; MG/100ML
INJECTION, SOLUTION INTRAVENOUS CONTINUOUS
Status: DISCONTINUED | OUTPATIENT
Start: 2025-04-30 | End: 2025-05-01

## 2025-04-30 RX ORDER — SODIUM CHLORIDE 9 MG/ML
INJECTION, SOLUTION INTRAVENOUS PRN
Status: DISCONTINUED | OUTPATIENT
Start: 2025-04-30 | End: 2025-05-05 | Stop reason: HOSPADM

## 2025-04-30 RX ORDER — ACETAMINOPHEN 650 MG/1
650 SUPPOSITORY RECTAL EVERY 6 HOURS PRN
Status: DISCONTINUED | OUTPATIENT
Start: 2025-04-30 | End: 2025-05-05 | Stop reason: HOSPADM

## 2025-04-30 RX ORDER — ENOXAPARIN SODIUM 100 MG/ML
40 INJECTION SUBCUTANEOUS NIGHTLY
Status: DISCONTINUED | OUTPATIENT
Start: 2025-04-30 | End: 2025-05-05 | Stop reason: HOSPADM

## 2025-04-30 RX ORDER — SENNA AND DOCUSATE SODIUM 50; 8.6 MG/1; MG/1
1 TABLET, FILM COATED ORAL 2 TIMES DAILY
Status: DISCONTINUED | OUTPATIENT
Start: 2025-04-30 | End: 2025-05-05 | Stop reason: HOSPADM

## 2025-04-30 RX ADMIN — SODIUM CHLORIDE 1000 ML: 0.9 INJECTION, SOLUTION INTRAVENOUS at 18:53

## 2025-04-30 RX ADMIN — SODIUM CHLORIDE, POTASSIUM CHLORIDE, SODIUM LACTATE AND CALCIUM CHLORIDE: 600; 310; 30; 20 INJECTION, SOLUTION INTRAVENOUS at 22:28

## 2025-04-30 RX ADMIN — ENOXAPARIN SODIUM 40 MG: 100 INJECTION SUBCUTANEOUS at 22:25

## 2025-04-30 RX ADMIN — ALLOPURINOL 100 MG: 100 TABLET ORAL at 22:25

## 2025-04-30 RX ADMIN — SENNOSIDES, DOCUSATE SODIUM 1 TABLET: 50; 8.6 TABLET, FILM COATED ORAL at 22:25

## 2025-04-30 ASSESSMENT — LIFESTYLE VARIABLES
HOW OFTEN DO YOU HAVE A DRINK CONTAINING ALCOHOL: NEVER
HOW MANY STANDARD DRINKS CONTAINING ALCOHOL DO YOU HAVE ON A TYPICAL DAY: PATIENT DOES NOT DRINK

## 2025-04-30 ASSESSMENT — PAIN - FUNCTIONAL ASSESSMENT: PAIN_FUNCTIONAL_ASSESSMENT: NONE - DENIES PAIN

## 2025-04-30 NOTE — ED TRIAGE NOTES
Pt arrives to ED via private vehicle with family with c/o AMS, bilateral leg swelling, decreased appetite, and trouble walking. Pt was admitted to the hospital 3 weeks ago after a fall, D/C to rehab facility. Pt was released from rehab facility on Friday, and has been experiencing difficulty getting around her house since. Pt family states that pt has been confused, loss of appetite and leg swelling since Monday. Pt denies SOB. Pt denies hx of CHF. Pt AAO x 4. VSS.

## 2025-04-30 NOTE — ED PROVIDER NOTES
[facility] emergency room visit    EMERGENCY DEPARTMENT ENCOUNTER            Pt Name: Xena Jimenez   MRN: [id number]   Birthdate 1940   Date of evaluation: 4/30/2025   Provider: Velasquez Barrera MD   PCP: Jessy Morales MD   Note Started: 6:06 PM EDT 4/30/25          CHIEF COMPLAINT     Chief Complaint   Patient presents with    Altered Mental Status     Pt arrives to ED via private vehicle with family with c/o AMS, bilateral leg swelling, decreased appetite, and trouble walking. Pt was admitted to the hospital 3 weeks ago after a fall, D/C to rehab facility. Pt was released from rehab facility on Friday, and has been experiencing difficulty getting around her house since. Pt family states that pt has been confused, loss of appetite and leg swelling since Monday. Pt denies SOB. Pt denies hx of CHF. Pt AAO x 4. VSS.            HISTORY OF PRESENT ILLNESS:   History from : Patient and Family son and daughter-in-law    Limitations to history : None     Xena Jimenez is a 84 y.o. female who  has a past medical history of Achalasia of esophagus, Acquired hypothyroidism, Bell's palsy, Dermatomyositis (HCC), Dyslipidemia, Essential hypertension, Gout, Hyperparathyroidism , secondary, non-renal, Multinodular goiter, Sarcoidosis of lung, Tracheobronchomalacia, and Vitamin D deficiency. presents to the emergency room with her son and daughter-in-law for evaluation of of generalized weakness, intermittent confusion and failure to thrive at home.  Patient was just discharged home from rehab facility 4 days ago.  Patient states that since she has been home she is been having a very hard time getting around her house with her walker.  She denies any falls.  States has not been eating or drinking much because she is having difficulty taking care of herself.  Patient does live alone.  Her son and daughter-in-law do stop by daily but cannot stay with the patient 24/7.  The patient denies any chest

## 2025-04-30 NOTE — H&P
V2.0  History and Physical      Name:  Xena Jimenez /Age/Sex: 1940  (84 y.o. female)   MRN & CSN:  [id number] & [id number] Encounter Date/Time: 2025 7:52 PM EDT   Location:  015/A-15 PCP: Jessy Morales MD       Hospital Day: 1    Assessment and Plan:   Xena Jimenez is a 84 y.o. female non-smoker with a pmh of hyperparathyroidism, sarcoidosis of lung, dermatomyositis, tracheobronchial, malacia, achalasia, gout, hypertension, dyslipidemia, acquired hypothyroidism who presents with Parathyroid related hypercalcemia.    Hospital Problems           Last Modified POA    * (Principal)Hypercalcemia 2025 Yes    Primary hypertension 2025 Yes       Plan:  Aggressive hydration, check intact PTH and PTH related peptide as well as vitamin D levels and monitor calcium  Start metoprolol and as needed hydralazine for elevated blood pressure  Resume home regimen for chronic stable conditions    Disposition:   Current Living situation: Home  Expected Disposition: Home  Estimated D/C: 2 to 3 days    Diet ADULT DIET; Dysphagia - Pureed; Low Fat/Low Chol/High Fiber/MICHELET   DVT Prophylaxis Lovenox, heparin, SCDs, ambulation, Eliquis, Xarelto, Coumadin   Code Status Full Code   Surrogate Decision Maker/ POA Daughter     Personally reviewed Lab Studies and Imaging     Discussed management of the case with no one who recommended n/a.    EKG interpreted personally and results normal sinus rhythm with a ventricular rate of 83, QTc 392, no acute ischemic changes.    Imaging that was interpreted personally includes chest x-ray and results negative for any acute cardiopulmonary process.    Drugs that require monitoring for toxicity include none and the method of monitoring was n/a.        History from:     patient    History of Present Illness:     Chief Complaint: Generalized weakness, decreased appetite and confusion  Xena Jimenez is a 84 y.o. female non-smoker with pmh of hyperparathyroidism,

## 2025-05-01 LAB
25(OH)D3 SERPL-MCNC: 36.8 NG/ML
ANION GAP SERPL CALCULATED.3IONS-SCNC: 11 MMOL/L (ref 3–16)
ANION GAP SERPL CALCULATED.3IONS-SCNC: 8 MMOL/L (ref 3–16)
BASOPHILS # BLD: 0.1 K/UL (ref 0–0.2)
BASOPHILS NFR BLD: 0.8 %
BUN SERPL-MCNC: 18 MG/DL (ref 7–20)
BUN SERPL-MCNC: 19 MG/DL (ref 7–20)
CALCIUM SERPL-MCNC: 12.4 MG/DL (ref 8.3–10.6)
CALCIUM SERPL-MCNC: 12.6 MG/DL (ref 8.3–10.6)
CHLORIDE SERPL-SCNC: 102 MMOL/L (ref 99–110)
CHLORIDE SERPL-SCNC: 105 MMOL/L (ref 99–110)
CO2 SERPL-SCNC: 26 MMOL/L (ref 21–32)
CO2 SERPL-SCNC: 27 MMOL/L (ref 21–32)
CREAT SERPL-MCNC: 0.9 MG/DL (ref 0.6–1.2)
CREAT SERPL-MCNC: 0.9 MG/DL (ref 0.6–1.2)
DEPRECATED RDW RBC AUTO: 15.7 % (ref 12.4–15.4)
EKG ATRIAL RATE: 83 BPM
EKG DIAGNOSIS: NORMAL
EKG P AXIS: 47 DEGREES
EKG P-R INTERVAL: 184 MS
EKG Q-T INTERVAL: 334 MS
EKG QRS DURATION: 76 MS
EKG QTC CALCULATION (BAZETT): 392 MS
EKG R AXIS: 9 DEGREES
EKG T AXIS: 60 DEGREES
EKG VENTRICULAR RATE: 83 BPM
EOSINOPHIL # BLD: 0.4 K/UL (ref 0–0.6)
EOSINOPHIL NFR BLD: 6.3 %
GFR SERPLBLD CREATININE-BSD FMLA CKD-EPI: 62 ML/MIN/{1.73_M2}
GFR SERPLBLD CREATININE-BSD FMLA CKD-EPI: 62 ML/MIN/{1.73_M2}
GLUCOSE SERPL-MCNC: 161 MG/DL (ref 70–99)
GLUCOSE SERPL-MCNC: 97 MG/DL (ref 70–99)
HCT VFR BLD AUTO: 36.8 % (ref 36–48)
HGB BLD-MCNC: 12.1 G/DL (ref 12–16)
LYMPHOCYTES # BLD: 1 K/UL (ref 1–5.1)
LYMPHOCYTES NFR BLD: 14.2 %
MCH RBC QN AUTO: 29.1 PG (ref 26–34)
MCHC RBC AUTO-ENTMCNC: 32.9 G/DL (ref 31–36)
MCV RBC AUTO: 88.5 FL (ref 80–100)
MONOCYTES # BLD: 0.9 K/UL (ref 0–1.3)
MONOCYTES NFR BLD: 13.4 %
NEUTROPHILS # BLD: 4.4 K/UL (ref 1.7–7.7)
NEUTROPHILS NFR BLD: 65.3 %
PHOSPHATE SERPL-MCNC: 3 MG/DL (ref 2.5–4.9)
PLATELET # BLD AUTO: 233 K/UL (ref 135–450)
PMV BLD AUTO: 8.3 FL (ref 5–10.5)
POTASSIUM SERPL-SCNC: 3.6 MMOL/L (ref 3.5–5.1)
POTASSIUM SERPL-SCNC: 3.8 MMOL/L (ref 3.5–5.1)
RBC # BLD AUTO: 4.16 M/UL (ref 4–5.2)
SODIUM SERPL-SCNC: 139 MMOL/L (ref 136–145)
SODIUM SERPL-SCNC: 140 MMOL/L (ref 136–145)
WBC # BLD AUTO: 6.8 K/UL (ref 4–11)

## 2025-05-01 PROCEDURE — 82306 VITAMIN D 25 HYDROXY: CPT

## 2025-05-01 PROCEDURE — 2580000003 HC RX 258: Performed by: INTERNAL MEDICINE

## 2025-05-01 PROCEDURE — 2580000003 HC RX 258: Performed by: STUDENT IN AN ORGANIZED HEALTH CARE EDUCATION/TRAINING PROGRAM

## 2025-05-01 PROCEDURE — 84100 ASSAY OF PHOSPHORUS: CPT

## 2025-05-01 PROCEDURE — 97162 PT EVAL MOD COMPLEX 30 MIN: CPT

## 2025-05-01 PROCEDURE — 2500000003 HC RX 250 WO HCPCS: Performed by: INTERNAL MEDICINE

## 2025-05-01 PROCEDURE — 86235 NUCLEAR ANTIGEN ANTIBODY: CPT

## 2025-05-01 PROCEDURE — 85025 COMPLETE CBC W/AUTO DIFF WBC: CPT

## 2025-05-01 PROCEDURE — 6370000000 HC RX 637 (ALT 250 FOR IP): Performed by: INTERNAL MEDICINE

## 2025-05-01 PROCEDURE — 36415 COLL VENOUS BLD VENIPUNCTURE: CPT

## 2025-05-01 PROCEDURE — 97530 THERAPEUTIC ACTIVITIES: CPT

## 2025-05-01 PROCEDURE — 6360000002 HC RX W HCPCS: Performed by: INTERNAL MEDICINE

## 2025-05-01 PROCEDURE — 97535 SELF CARE MNGMENT TRAINING: CPT

## 2025-05-01 PROCEDURE — 6360000002 HC RX W HCPCS: Performed by: REGISTERED NURSE

## 2025-05-01 PROCEDURE — 82164 ANGIOTENSIN I ENZYME TEST: CPT

## 2025-05-01 PROCEDURE — 97165 OT EVAL LOW COMPLEX 30 MIN: CPT

## 2025-05-01 PROCEDURE — 93010 ELECTROCARDIOGRAM REPORT: CPT | Performed by: INTERNAL MEDICINE

## 2025-05-01 PROCEDURE — 6360000002 HC RX W HCPCS: Performed by: STUDENT IN AN ORGANIZED HEALTH CARE EDUCATION/TRAINING PROGRAM

## 2025-05-01 PROCEDURE — 80048 BASIC METABOLIC PNL TOTAL CA: CPT

## 2025-05-01 PROCEDURE — 2060000000 HC ICU INTERMEDIATE R&B

## 2025-05-01 RX ORDER — HYDRALAZINE HYDROCHLORIDE 20 MG/ML
10 INJECTION INTRAMUSCULAR; INTRAVENOUS EVERY 6 HOURS PRN
Status: DISCONTINUED | OUTPATIENT
Start: 2025-05-01 | End: 2025-05-05 | Stop reason: HOSPADM

## 2025-05-01 RX ORDER — CALCITONIN SALMON 200 [USP'U]/ML
4 INJECTION, SOLUTION INTRAMUSCULAR; SUBCUTANEOUS ONCE
Status: COMPLETED | OUTPATIENT
Start: 2025-05-01 | End: 2025-05-01

## 2025-05-01 RX ORDER — SODIUM CHLORIDE 9 MG/ML
INJECTION, SOLUTION INTRAVENOUS CONTINUOUS
Status: DISCONTINUED | OUTPATIENT
Start: 2025-05-01 | End: 2025-05-03

## 2025-05-01 RX ADMIN — HYDRALAZINE HYDROCHLORIDE 10 MG: 20 INJECTION INTRAMUSCULAR; INTRAVENOUS at 01:26

## 2025-05-01 RX ADMIN — SODIUM CHLORIDE, PRESERVATIVE FREE 10 ML: 5 INJECTION INTRAVENOUS at 09:31

## 2025-05-01 RX ADMIN — SENNOSIDES, DOCUSATE SODIUM 1 TABLET: 50; 8.6 TABLET, FILM COATED ORAL at 20:04

## 2025-05-01 RX ADMIN — Medication 1 TABLET: at 09:30

## 2025-05-01 RX ADMIN — SODIUM CHLORIDE, POTASSIUM CHLORIDE, SODIUM LACTATE AND CALCIUM CHLORIDE: 600; 310; 30; 20 INJECTION, SOLUTION INTRAVENOUS at 06:30

## 2025-05-01 RX ADMIN — LEVOTHYROXINE SODIUM 88 MCG: 0.09 TABLET ORAL at 05:58

## 2025-05-01 RX ADMIN — ENOXAPARIN SODIUM 40 MG: 100 INJECTION SUBCUTANEOUS at 20:04

## 2025-05-01 RX ADMIN — SENNOSIDES, DOCUSATE SODIUM 1 TABLET: 50; 8.6 TABLET, FILM COATED ORAL at 09:31

## 2025-05-01 RX ADMIN — ASPIRIN 81 MG: 81 TABLET, CHEWABLE ORAL at 09:31

## 2025-05-01 RX ADMIN — HYDRALAZINE HYDROCHLORIDE 10 MG: 20 INJECTION INTRAMUSCULAR; INTRAVENOUS at 20:15

## 2025-05-01 RX ADMIN — SODIUM CHLORIDE, POTASSIUM CHLORIDE, SODIUM LACTATE AND CALCIUM CHLORIDE: 600; 310; 30; 20 INJECTION, SOLUTION INTRAVENOUS at 11:30

## 2025-05-01 RX ADMIN — ALLOPURINOL 100 MG: 100 TABLET ORAL at 09:31

## 2025-05-01 RX ADMIN — ALLOPURINOL 100 MG: 100 TABLET ORAL at 20:04

## 2025-05-01 RX ADMIN — SODIUM CHLORIDE, POTASSIUM CHLORIDE, SODIUM LACTATE AND CALCIUM CHLORIDE: 600; 310; 30; 20 INJECTION, SOLUTION INTRAVENOUS at 02:28

## 2025-05-01 RX ADMIN — SODIUM CHLORIDE: 0.9 INJECTION, SOLUTION INTRAVENOUS at 13:52

## 2025-05-01 RX ADMIN — SODIUM CHLORIDE, PRESERVATIVE FREE 10 ML: 5 INJECTION INTRAVENOUS at 20:04

## 2025-05-01 RX ADMIN — CALCITONIN SALMON 258 UNITS: 200 INJECTION, SOLUTION INTRAMUSCULAR; SUBCUTANEOUS at 10:44

## 2025-05-01 RX ADMIN — CALCITONIN SALMON 258 UNITS: 200 INJECTION, SOLUTION INTRAMUSCULAR; SUBCUTANEOUS at 17:54

## 2025-05-01 RX ADMIN — SODIUM CHLORIDE: 0.9 INJECTION, SOLUTION INTRAVENOUS at 21:17

## 2025-05-01 NOTE — CARE COORDINATION
05/01/25 1625   Readmission Assessment   Previous Disposition SNF   Who is being Interviewed Patient   What was the patient's/caregiver's perception as to why they think they needed to return back to the hospital? Other (Comment)  (felt lightheaded, actually fell at home.)   Did you visit your Primary Care Physician after you left the hospital, before you returned this time? Yes   Did you see a specialist, such as Cardiac, Pulmonary, Orthopedic Physician, etc. after you left the hospital? No   Who advised the patient to return to the hospital? Self-referral;Caregiver   Does the patient report anything that got in the way of taking their medications? No   In our efforts to provide the best possible care to you and others like you, can you think of anything that we could have done to help you after you left the hospital the first time, so that you might not have needed to return so soon? Other (Comment)  (honestly unsure.)       Respectfully submitted,    RILEY Padron  [facility]   [phone]    Electronically signed by MARTINEZ Mcdonald LSW on 5/1/2025 at 4:25 PM

## 2025-05-01 NOTE — PLAN OF CARE
Problem: Safety - Adult  Goal: Free from fall injury  Outcome: Progressing     Problem: Discharge Planning  Goal: Discharge to home or other facility with appropriate resources  Outcome: Progressing     Problem: Skin/Tissue Integrity  Goal: Skin integrity remains intact  Description: 1.  Monitor for areas of redness and/or skin breakdown2.  Assess vascular access sites hourly3.  Every 4-6 hours minimum:  Change oxygen saturation probe site4.  Every 4-6 hours:  If on nasal continuous positive airway pressure, respiratory therapy assess nares and determine need for appliance change or resting period  Outcome: Progressing     Problem: ABCDS Injury Assessment  Goal: Absence of physical injury  Outcome: Progressing     Problem: Neurosensory - Adult  Goal: Achieves stable or improved neurological status  Outcome: Progressing    Problem: Safety - Adult  Goal: Free from fall injury  Outcome: Progressing     Problem: Discharge Planning  Goal: Discharge to home or other facility with appropriate resources  Outcome: Progressing     Problem: Skin/Tissue Integrity  Goal: Skin integrity remains intact  Description: 1.  Monitor for areas of redness and/or skin breakdown2.  Assess vascular access sites hourly3.  Every 4-6 hours minimum:  Change oxygen saturation probe site4.  Every 4-6 hours:  If on nasal continuous positive airway pressure, respiratory therapy assess nares and determine need for appliance change or resting period  Outcome: Progressing     Problem: ABCDS Injury Assessment  Goal: Absence of physical injury  Outcome: Progressing     Problem: Neurosensory - Adult  Goal: Achieves stable or improved neurological status  Outcome: Progressing     Problem: Cardiovascular - Adult  Goal: Absence of cardiac dysrhythmias or at baseline  Outcome: Progressing     Problem: Metabolic/Fluid and Electrolytes - Adult  Goal: Electrolytes maintained within normal limits  Outcome: Progressing

## 2025-05-01 NOTE — CONSULTS
[facility]  Phone: [phone]  Fax: [phone]  [employer]         Reason for Consult: Hypercalcemia  Requesting Physician:  Dr. Berger    History Obtained From:  patient, daughter, electronic medical record    History of Present Ilness: 84-year-old pleasant white female with history of pulmonary sarcoidosis, dermatomyositis, gout, hypertension and history of hyperparathyroidism diagnosed in September 2013 when she presented with hypercalcemia  She was diagnosed with primary hyperparathyroidism and underwent right inferior parathyroidectomy in September 2013 per Dr. Grimaldo  Her calcium has since been normalized and her PTH had been in the normal or low normal range  She has followed with Dr. Coyne and her last PTH was 23.7 with a calcium of 10 around October 2024 her PTHrp has been mildly elevated  She now presents with several days history of increasing fatigue, weakness, constipation and confusion  She has had decreased appetite and oral intake  No report of nausea vomiting diarrhea  Noted to have serum calcium of 14.7 mg  She has been on levothyroxine but no intake of calcium supplementation, vitamin D, vitamin A  She is awake alert answering questions appropriately at this time  Notes some lower back pain  No history of kidney stones denies dysuria hematuria    Past Medical History:        Diagnosis Date    Achalasia of esophagus     Acquired hypothyroidism     Bell's palsy     Dermatomyositis (HCC)     Dyslipidemia     Essential hypertension     Gout     Hyperparathyroidism , secondary, non-renal     Multinodular goiter     Sarcoidosis of lung     Tracheobronchomalacia     Vitamin D deficiency        Past Surgical History:        Procedure Laterality Date    APPENDECTOMY      CHOLECYSTECTOMY         Home Medications:    No current facility-administered medications on file prior to encounter.     Current Outpatient Medications on File Prior to Encounter   Medication Sig Dispense

## 2025-05-01 NOTE — CARE COORDINATION
SW received call from Marsha at [facility] stating that they can accept. They stated that the patient would have a $214 copay per day starting on rehab day 10. CHOLO updated epic to reflect.     Respectfully submitted,    Ksenia HENRIQUEZ, RILEY  [facility]   [phone]    Electronically signed by MARTINEZ Mcdonald, LITZY on 5/1/2025 at 4:17 PM

## 2025-05-01 NOTE — ED NOTES
ED TO INPATIENT SBAR HANDOFF    Patient Name: Xena Jimenez   Preferred Name: Xena  : 1940  84 y.o.   Family/Caregiver Present: no   Code Status Order: Full Code  PO Status: NPO:  Telemetry Order: Yes  C-SSRS: Risk of Suicide: No Risk  Sitter no   Restraints:     Sepsis Risk Score      Situation  Chief Complaint   Patient presents with    Altered Mental Status     Pt arrives to ED via private vehicle with family with c/o AMS, bilateral leg swelling, decreased appetite, and trouble walking. Pt was admitted to the hospital 3 weeks ago after a fall, D/C to rehab facility. Pt was released from rehab facility on Friday, and has been experiencing difficulty getting around her house since. Pt family states that pt has been confused, loss of appetite and leg swelling since Monday. Pt denies SOB. Pt denies hx of CHF. Pt AAO x 4. VSS.      Brief Description of Patient's Condition:           Mental Status: oriented, alert, coherent, logical, thought processes intact, and able to concentrate and follow conversation  Arrived from:Home  Imaging:   XR CHEST PORTABLE   Final Result   Stable chronic changes with no acute abnormality seen.           Abnormal labs:   Abnormal Labs Reviewed   CBC WITH AUTO DIFFERENTIAL - Abnormal; Notable for the following components:       Result Value    RDW 15.8 (*)     All other components within normal limits   COMPREHENSIVE METABOLIC PANEL W/ REFLEX TO MG FOR LOW K - Abnormal; Notable for the following components:    BUN 22 (*)     Est, Glom Filt Rate 55 (*)     Calcium 14.7 (*)     ALT 6 (*)     All other components within normal limits    Narrative:     CALL  [facility] tel. [phone],  Chemistry results called to and read back by Linsey Fox RN, 2025  19:00, by PHILOMENA   BLOOD GAS, VENOUS - Abnormal; Notable for the following components:    pCO2, Tre 53.1 (*)     HCO3, Venous 35 (*)     All other components within normal limits   TROPONIN - Abnormal; Notable for the 
1836   Alcohol Cap Used No 04/30/25 1836   Dressing Status Clean, dry & intact 04/30/25 1836   Dressing Type Transparent 04/30/25 1836     Active Central Lines:                          Active Wounds:    Active Melendez's:    Active Feeding Tubes:      Administered Medications:   Medications   sodium chloride flush 0.9 % injection 5-40 mL (has no administration in time range)   sodium chloride flush 0.9 % injection 5-40 mL (has no administration in time range)   0.9 % sodium chloride infusion (has no administration in time range)   potassium chloride (KLOR-CON M) extended release tablet 40 mEq (has no administration in time range)     Or   potassium bicarb-citric acid (EFFER-K) effervescent tablet 40 mEq (has no administration in time range)     Or   potassium chloride 10 mEq/100 mL IVPB (Peripheral Line) (has no administration in time range)   magnesium sulfate 2000 mg in 50 mL IVPB premix (has no administration in time range)   enoxaparin (LOVENOX) injection 40 mg (has no administration in time range)   ondansetron (ZOFRAN-ODT) disintegrating tablet 4 mg (has no administration in time range)     Or   ondansetron (ZOFRAN) injection 4 mg (has no administration in time range)   polyethylene glycol (GLYCOLAX) packet 17 g (has no administration in time range)   acetaminophen (TYLENOL) tablet 650 mg (has no administration in time range)     Or   acetaminophen (TYLENOL) suppository 650 mg (has no administration in time range)   lactated ringers infusion (has no administration in time range)   sennosides-docusate sodium (SENOKOT-S) 8.6-50 MG tablet 1 tablet (has no administration in time range)   bisacodyl (DULCOLAX) EC tablet 5 mg (has no administration in time range)   aluminum & magnesium hydroxide-simethicone (MAALOX PLUS) 200-200-20 MG/5ML suspension 30 mL (has no administration in time range)   allopurinol (ZYLOPRIM) tablet 100 mg (has no administration in time range)   aspirin chewable tablet 81 mg (has no

## 2025-05-01 NOTE — CARE COORDINATION
Case Management Assessment  Initial Evaluation    Date/Time of Evaluation: 5/1/2025 4:22 PM  Assessment Completed by: MARTINEZ Mcdonald, GARYW    If patient is discharged prior to next notation, then this note serves as note for discharge by case management.    Patient Name: Xena Jimenez                   YOB: 1940  Diagnosis: Hypercalcemia [E83.52]  Parathyroid related hypercalcemia [E83.52, E21.5]  Intermittent confusion [R41.0]                   Date / Time: 4/30/2025  5:02 PM    Patient Admission Status: Inpatient   Readmission Risk (Low < 19, Mod (19-27), High > 27): Readmission Risk Score: 12.1    Current PCP: Jessy Morales MD  PCP verified by CM? Yes    Chart Reviewed: Yes      History Provided by: Patient  Patient Orientation: Alert and Oriented    Patient Cognition: Alert    Hospitalization in the last 30 days (Readmission):  Yes    If yes, Readmission Assessment in  Navigator will be completed.    Advance Directives:      Code Status: Full Code   Patient's Primary Decision Maker is: Legal Next of Kin    Primary Decision Maker: Phylicia Pride - Child - [phone]    Secondary Decision Maker: ANNA JOHNSTON  Child - [phone]    Discharge Planning:    Patient lives with: Alone Type of Home: House  Primary Care Giver: Self  Patient Support Systems include: Family Members, Children   Current Financial resources: Medicare  Current community resources: None  Current services prior to admission: Durable Medical Equipment            Current DME: Walker, Cane, Other (Comment) (lift chair.)            Type of Home Care services:  None    ADLS  Prior functional level: Independent in ADLs/IADLs  Current functional level: Assistance with the following:, Housework, Shopping    PT AM-PAC: 16 /24  OT AM-PAC: 17 /24    Family can provide assistance at DC: Yes  Would you like Case Management to discuss the discharge plan with any other family members/significant others, and if so, who? Yes

## 2025-05-01 NOTE — ACP (ADVANCE CARE PLANNING)
Advance Care Planning   Healthcare Decision Maker:    Primary Decision Maker: Phylicia Pride - Child - [phone]    Secondary Decision Maker: ANNA JOHNSTON - Child - [phone]    HCPOA received from patient and family. Dated 4/18/2023. Uploaded into epic for future review.     Respectfully submitted,    Ksenia HENRIQUEZ, KYAS  [facility]   [phone]    Electronically signed by MARTINEZ Mcdonald, GARYW on 5/1/2025 at 1:42 PM

## 2025-05-01 NOTE — DISCHARGE INSTR - COC
Continuity of Care Form    Patient Name: Xena Jimenez   :  1940  MRN:  [id number]    Admit date:  2025  Discharge date:  25    Code Status Order: Full Code   Advance Directives:     Admitting Physician:  Yvette Wiley MD  PCP: Jessy Morales MD    Discharging Nurse: LENORA Wade  Discharging Hospital Unit/Room#: V2Y-0783/5277-01  Discharging Unit Phone Number: [phone]    Emergency Contact:   Extended Emergency Contact Information  Primary Emergency Contact: Phylicia Pride  Home Phone: [phone]  Mobile Phone: [phone]  Relation: Child  Secondary Emergency Contact: ANNA JOHNSTON  Home Phone: [phone]  Work Phone: [phone]  Relation: Child    Past Surgical History:  Past Surgical History:   Procedure Laterality Date    APPENDECTOMY      CHOLECYSTECTOMY         Immunization History:   Immunization History   Administered Date(s) Administered    COVID-19, MODERNA Bivalent, (age 12y+), IM, 50 mcg/0.5 mL 10/15/2022, 2023    COVID-19, MODERNA, , (age 12y+), IM, 50mcg/0.5mL 2023, 2024       Active Problems:  Patient Active Problem List   Diagnosis Code    Vertigo R42    Sepsis (HCC) A41.9    Hypercalcemia E83.52    Primary hypertension I10       Isolation/Infection:   Isolation            No Isolation          Patient Infection Status    None to display              Nurse Assessment:  Last Vital Signs: BP (!) 147/67   Pulse 68   Temp 98.4 °F (36.9 °C) (Oral)   Resp 18   Ht 1.575 m (5' 2\")   Wt 64.6 kg (142 lb 6.7 oz)   SpO2 95%   BMI 26.05 kg/m²     Last documented pain score (0-10 scale):    Last Weight:   Wt Readings from Last 1 Encounters:   25 64.6 kg (142 lb 6.7 oz)     Mental Status:  oriented and alert    IV Access:  - None    Nursing Mobility/ADLs:  Walking   Assisted  Transfer  Assisted  Bathing  Assisted  Dressing  Assisted  Toileting  Assisted  Feeding  Independent  Med Admin  Independent  Med Delivery   whole    Wound Care

## 2025-05-02 ENCOUNTER — APPOINTMENT (OUTPATIENT)
Dept: CT IMAGING | Age: 85
DRG: 641 | End: 2025-05-02
Payer: MEDICARE

## 2025-05-02 LAB
ALBUMIN SERPL-MCNC: 3.4 G/DL (ref 3.4–5)
ALBUMIN/GLOB SERPL: 1.2 {RATIO} (ref 1.1–2.2)
ALP SERPL-CCNC: 97 U/L (ref 40–129)
ALT SERPL-CCNC: 6 U/L (ref 10–40)
ANA SER QL IA: POSITIVE
ANION GAP SERPL CALCULATED.3IONS-SCNC: 10 MMOL/L (ref 3–16)
ANION GAP SERPL CALCULATED.3IONS-SCNC: 11 MMOL/L (ref 3–16)
AST SERPL-CCNC: 26 U/L (ref 15–37)
BILIRUB SERPL-MCNC: 0.4 MG/DL (ref 0–1)
BUN SERPL-MCNC: 13 MG/DL (ref 7–20)
BUN SERPL-MCNC: 15 MG/DL (ref 7–20)
CALCIUM SERPL-MCNC: 10.9 MG/DL (ref 8.3–10.6)
CALCIUM SERPL-MCNC: 11.1 MG/DL (ref 8.3–10.6)
CENTROMERE B IGG SER QL LINE BLOT: <0.2 AI (ref 0–0.9)
CHLORIDE SERPL-SCNC: 101 MMOL/L (ref 99–110)
CHLORIDE SERPL-SCNC: 103 MMOL/L (ref 99–110)
CHROMATIN AB SERPL-ACNC: <0.2 AI (ref 0–0.9)
CO2 SERPL-SCNC: 25 MMOL/L (ref 21–32)
CO2 SERPL-SCNC: 26 MMOL/L (ref 21–32)
CREAT SERPL-MCNC: 0.8 MG/DL (ref 0.6–1.2)
CREAT SERPL-MCNC: 0.8 MG/DL (ref 0.6–1.2)
CRP SERPL-MCNC: 25.1 MG/L (ref 0–5.1)
DSDNA AB SER-ACNC: <1 IU/ML (ref 0–9)
EKG ATRIAL RATE: 93 BPM
EKG DIAGNOSIS: NORMAL
EKG P AXIS: 59 DEGREES
EKG P-R INTERVAL: 166 MS
EKG Q-T INTERVAL: 356 MS
EKG QRS DURATION: 74 MS
EKG QTC CALCULATION (BAZETT): 442 MS
EKG R AXIS: -19 DEGREES
EKG T AXIS: 73 DEGREES
EKG VENTRICULAR RATE: 93 BPM
ENA JO1 AB SER IA-ACNC: <0.2 AI (ref 0–0.9)
ENA RNP AB SER IA-ACNC: <0.2 AI (ref 0–0.9)
ENA SCL70 IGG SER IA-ACNC: <0.2 AI (ref 0–0.9)
ENA SM AB SER IA-ACNC: <0.2 AI (ref 0–0.9)
ENA SM+RNP IGG SER-ACNC: <0.2 AI (ref 0–0.9)
ENA SS-A AB SER IA-ACNC: 4.1 AI (ref 0–0.9)
ENA SS-A AB SER IA-ACNC: 4.1 AI (ref 0–0.9)
ENA SS-B AB SER IA-ACNC: <0.2 AI (ref 0–0.9)
ENA SS-B AB SER IA-ACNC: <0.2 AI (ref 0–0.9)
ERYTHROCYTE [SEDIMENTATION RATE] IN BLOOD BY WESTERGREN METHOD: 51 MM/HR (ref 0–30)
GFR SERPLBLD CREATININE-BSD FMLA CKD-EPI: 71 ML/MIN/{1.73_M2}
GFR SERPLBLD CREATININE-BSD FMLA CKD-EPI: 71 ML/MIN/{1.73_M2}
GLUCOSE SERPL-MCNC: 110 MG/DL (ref 70–99)
GLUCOSE SERPL-MCNC: 116 MG/DL (ref 70–99)
MAGNESIUM SERPL-MCNC: 1.26 MG/DL (ref 1.8–2.4)
MAGNESIUM SERPL-MCNC: 2.83 MG/DL (ref 1.8–2.4)
PHOSPHATE SERPL-MCNC: 2.3 MG/DL (ref 2.5–4.9)
POTASSIUM SERPL-SCNC: 3.1 MMOL/L (ref 3.5–5.1)
POTASSIUM SERPL-SCNC: 3.7 MMOL/L (ref 3.5–5.1)
PROT SERPL-MCNC: 6.2 G/DL (ref 6.4–8.2)
RIBOSOMAL P AB SER IA-ACNC: <0.2 AI (ref 0–0.9)
SODIUM SERPL-SCNC: 138 MMOL/L (ref 136–145)
SODIUM SERPL-SCNC: 138 MMOL/L (ref 136–145)

## 2025-05-02 PROCEDURE — 97110 THERAPEUTIC EXERCISES: CPT

## 2025-05-02 PROCEDURE — 2580000003 HC RX 258: Performed by: STUDENT IN AN ORGANIZED HEALTH CARE EDUCATION/TRAINING PROGRAM

## 2025-05-02 PROCEDURE — 6370000000 HC RX 637 (ALT 250 FOR IP): Performed by: INTERNAL MEDICINE

## 2025-05-02 PROCEDURE — 97116 GAIT TRAINING THERAPY: CPT

## 2025-05-02 PROCEDURE — 86235 NUCLEAR ANTIGEN ANTIBODY: CPT

## 2025-05-02 PROCEDURE — 93010 ELECTROCARDIOGRAM REPORT: CPT | Performed by: INTERNAL MEDICINE

## 2025-05-02 PROCEDURE — 94760 N-INVAS EAR/PLS OXIMETRY 1: CPT

## 2025-05-02 PROCEDURE — 86038 ANTINUCLEAR ANTIBODIES: CPT

## 2025-05-02 PROCEDURE — 83735 ASSAY OF MAGNESIUM: CPT

## 2025-05-02 PROCEDURE — 2580000003 HC RX 258: Performed by: INTERNAL MEDICINE

## 2025-05-02 PROCEDURE — 2060000000 HC ICU INTERMEDIATE R&B

## 2025-05-02 PROCEDURE — 86039 ANTINUCLEAR ANTIBODIES (ANA): CPT

## 2025-05-02 PROCEDURE — 6360000002 HC RX W HCPCS: Performed by: INTERNAL MEDICINE

## 2025-05-02 PROCEDURE — 2500000003 HC RX 250 WO HCPCS: Performed by: INTERNAL MEDICINE

## 2025-05-02 PROCEDURE — 85652 RBC SED RATE AUTOMATED: CPT

## 2025-05-02 PROCEDURE — 82652 VIT D 1 25-DIHYDROXY: CPT

## 2025-05-02 PROCEDURE — 97530 THERAPEUTIC ACTIVITIES: CPT

## 2025-05-02 PROCEDURE — 83516 IMMUNOASSAY NONANTIBODY: CPT

## 2025-05-02 PROCEDURE — 84100 ASSAY OF PHOSPHORUS: CPT

## 2025-05-02 PROCEDURE — 86140 C-REACTIVE PROTEIN: CPT

## 2025-05-02 PROCEDURE — 80053 COMPREHEN METABOLIC PANEL: CPT

## 2025-05-02 PROCEDURE — 36415 COLL VENOUS BLD VENIPUNCTURE: CPT

## 2025-05-02 PROCEDURE — 86225 DNA ANTIBODY NATIVE: CPT

## 2025-05-02 PROCEDURE — 93005 ELECTROCARDIOGRAM TRACING: CPT | Performed by: STUDENT IN AN ORGANIZED HEALTH CARE EDUCATION/TRAINING PROGRAM

## 2025-05-02 PROCEDURE — 2500000003 HC RX 250 WO HCPCS: Performed by: STUDENT IN AN ORGANIZED HEALTH CARE EDUCATION/TRAINING PROGRAM

## 2025-05-02 PROCEDURE — 71250 CT THORAX DX C-: CPT

## 2025-05-02 RX ORDER — AMLODIPINE BESYLATE 5 MG/1
5 TABLET ORAL DAILY
Status: DISCONTINUED | OUTPATIENT
Start: 2025-05-02 | End: 2025-05-05 | Stop reason: HOSPADM

## 2025-05-02 RX ADMIN — SENNOSIDES, DOCUSATE SODIUM 1 TABLET: 50; 8.6 TABLET, FILM COATED ORAL at 09:02

## 2025-05-02 RX ADMIN — ALLOPURINOL 100 MG: 100 TABLET ORAL at 09:02

## 2025-05-02 RX ADMIN — AMLODIPINE BESYLATE 5 MG: 5 TABLET ORAL at 13:30

## 2025-05-02 RX ADMIN — ENOXAPARIN SODIUM 40 MG: 100 INJECTION SUBCUTANEOUS at 21:33

## 2025-05-02 RX ADMIN — SODIUM CHLORIDE: 0.9 INJECTION, SOLUTION INTRAVENOUS at 03:36

## 2025-05-02 RX ADMIN — SENNOSIDES, DOCUSATE SODIUM 1 TABLET: 50; 8.6 TABLET, FILM COATED ORAL at 21:33

## 2025-05-02 RX ADMIN — POTASSIUM PHOSPHATE, MONOBASIC POTASSIUM PHOSPHATE, DIBASIC 20 MMOL: 224; 236 INJECTION, SOLUTION, CONCENTRATE INTRAVENOUS at 13:16

## 2025-05-02 RX ADMIN — ALLOPURINOL 100 MG: 100 TABLET ORAL at 21:33

## 2025-05-02 RX ADMIN — POTASSIUM BICARBONATE 40 MEQ: 391 TABLET, EFFERVESCENT ORAL at 06:25

## 2025-05-02 RX ADMIN — SODIUM CHLORIDE: 0.9 INJECTION, SOLUTION INTRAVENOUS at 11:13

## 2025-05-02 RX ADMIN — Medication 1 TABLET: at 09:02

## 2025-05-02 RX ADMIN — MAGNESIUM SULFATE HEPTAHYDRATE 2000 MG: 40 INJECTION, SOLUTION INTRAVENOUS at 09:07

## 2025-05-02 RX ADMIN — SODIUM CHLORIDE, PRESERVATIVE FREE 10 ML: 5 INJECTION INTRAVENOUS at 09:02

## 2025-05-02 RX ADMIN — ASPIRIN 81 MG: 81 TABLET, CHEWABLE ORAL at 09:02

## 2025-05-02 RX ADMIN — LEVOTHYROXINE SODIUM 88 MCG: 0.09 TABLET ORAL at 06:19

## 2025-05-02 RX ADMIN — SODIUM CHLORIDE: 0.9 INJECTION, SOLUTION INTRAVENOUS at 19:32

## 2025-05-02 RX ADMIN — MAGNESIUM SULFATE HEPTAHYDRATE 2000 MG: 40 INJECTION, SOLUTION INTRAVENOUS at 06:22

## 2025-05-02 NOTE — CARE COORDINATION
CHOLO reached out to [facility] to find out if Veronica received the referral sent yesterday. SW called [phone] and she advised that she received it but they will review it at morning meeting and call  Shantel with acceptance or denial. SW left her with Shantel's number for today.     Respectfully submitted,    Ksenia HENRIQUEZ, RILEY  [facility]   [phone]    Electronically signed by MARTINEZ Mcdonald, LSW on 5/2/2025 at 8:52 AM

## 2025-05-02 NOTE — PLAN OF CARE
Problem: Safety - Adult  Goal: Free from fall injury  5/2/2025 1020 by Martin Kemp RN  Outcome: Progressing     Problem: Discharge Planning  Goal: Discharge to home or other facility with appropriate resources  5/2/2025 1020 by Martin Kemp RN  Outcome: Progressing     Problem: Skin/Tissue Integrity  Goal: Skin integrity remains intact  Description: 1.  Monitor for areas of redness and/or skin breakdown2.  Assess vascular access sites hourly3.  Every 4-6 hours minimum:  Change oxygen saturation probe site4.  Every 4-6 hours:  If on nasal continuous positive airway pressure, respiratory therapy assess nares and determine need for appliance change or resting period  5/2/2025 1020 by Martin Kemp RN  Outcome: Progressing     Problem: ABCDS Injury Assessment  Goal: Absence of physical injury  5/2/2025 1020 by Martin Kemp RN  Outcome: Progressing     Problem: Neurosensory - Adult  Goal: Achieves stable or improved neurological status  5/2/2025 1020 by Martin Kemp RN  Outcome: Progressing     Problem: Cardiovascular - Adult  Goal: Absence of cardiac dysrhythmias or at baseline  5/2/2025 1020 by Martin Kemp RN  Outcome: Progressing     Problem: Metabolic/Fluid and Electrolytes - Adult  Goal: Electrolytes maintained within normal limits  5/2/2025 1020 by Martin Kemp RN  Outcome: Progressing

## 2025-05-02 NOTE — PLAN OF CARE
Problem: Safety - Adult  Goal: Free from fall injury  5/1/2025 2121 by Toshia Murray RN  Outcome: Progressing  Flowsheets (Taken 5/1/2025 2121)  Free From Fall Injury: Instruct family/caregiver on patient safety     Problem: Discharge Planning  Goal: Discharge to home or other facility with appropriate resources  5/1/2025 2121 by Toshia Murray RN  Outcome: Progressing  Flowsheets (Taken 5/1/2025 2121)  Discharge to home or other facility with appropriate resources: Identify barriers to discharge with patient and caregiver     Problem: Skin/Tissue Integrity  Goal: Skin integrity remains intact  Description: 1.  Monitor for areas of redness and/or skin breakdown2.  Assess vascular access sites hourly3.  Every 4-6 hours minimum:  Change oxygen saturation probe site4.  Every 4-6 hours:  If on nasal continuous positive airway pressure, respiratory therapy assess nares and determine need for appliance change or resting period  5/1/2025 2121 by Toshia Murray RN  Outcome: Progressing  Flowsheets (Taken 5/1/2025 2121)  Skin Integrity Remains Intact: Monitor for areas of redness and/or skin breakdown     Problem: ABCDS Injury Assessment  Goal: Absence of physical injury  5/1/2025 2121 by Toshia Murray RN  Outcome: Progressing  Flowsheets (Taken 5/1/2025 2121)  Absence of Physical Injury: Implement safety measures based on patient assessment     Problem: Neurosensory - Adult  Goal: Achieves stable or improved neurological status  5/1/2025 2121 by Toshia Murray RN  Outcome: Progressing  Flowsheets (Taken 5/1/2025 2121)  Achieves stable or improved neurological status: Assess for and report changes in neurological status     Problem: Cardiovascular - Adult  Goal: Absence of cardiac dysrhythmias or at baseline  5/1/2025 2121 by Toshia Murray RN  Outcome: Progressing  Flowsheets (Taken 5/1/2025 2121)  Absence of cardiac dysrhythmias or at baseline: Monitor cardiac rate and rhythm     Problem: Metabolic/Fluid and Electrolytes -

## 2025-05-02 NOTE — CARE COORDINATION
Discharge Planning:    [facility] able to accept this patient at discharge. This RN CM spoke with patient at bedside, informing her that both [facility] and [facility] are able to accept. Patient verbalized that she was uninterested in making the decision and requested that I reach out to her daughter, Phylicia re: which facility to choose.    This RN CM placed call to Phylicia. Left message requesting return call; awaiting response at this time.    Electronically signed by UMU MACIAS RN on 5/2/2025 at 2:43 PM    This RN CM placed follow-up call to patient's daughter. No answer at this time.    Electronically signed by UMU MACIAS RN on 5/2/2025 at 4:21 PM

## 2025-05-03 LAB
ACE SERPL-CCNC: 114 U/L (ref 16–85)
ALBUMIN SERPL-MCNC: 3.3 G/DL (ref 3.4–5)
ANION GAP SERPL CALCULATED.3IONS-SCNC: 8 MMOL/L (ref 3–16)
BUN SERPL-MCNC: 11 MG/DL (ref 7–20)
CALCIUM SERPL-MCNC: 9.7 MG/DL (ref 8.3–10.6)
CHLORIDE SERPL-SCNC: 105 MMOL/L (ref 99–110)
CO2 SERPL-SCNC: 25 MMOL/L (ref 21–32)
CREAT SERPL-MCNC: 0.7 MG/DL (ref 0.6–1.2)
GFR SERPLBLD CREATININE-BSD FMLA CKD-EPI: 84 ML/MIN/{1.73_M2}
GLUCOSE SERPL-MCNC: 92 MG/DL (ref 70–99)
PHOSPHATE SERPL-MCNC: 2.8 MG/DL (ref 2.5–4.9)
POTASSIUM SERPL-SCNC: 3.2 MMOL/L (ref 3.5–5.1)
SODIUM SERPL-SCNC: 138 MMOL/L (ref 136–145)
T4 FREE SERPL-MCNC: 1.1 NG/DL (ref 0.9–1.8)
TSH SERPL DL<=0.005 MIU/L-ACNC: 14.2 UIU/ML (ref 0.27–4.2)

## 2025-05-03 PROCEDURE — 84439 ASSAY OF FREE THYROXINE: CPT

## 2025-05-03 PROCEDURE — 6370000000 HC RX 637 (ALT 250 FOR IP): Performed by: INTERNAL MEDICINE

## 2025-05-03 PROCEDURE — 2060000000 HC ICU INTERMEDIATE R&B

## 2025-05-03 PROCEDURE — 6370000000 HC RX 637 (ALT 250 FOR IP): Performed by: STUDENT IN AN ORGANIZED HEALTH CARE EDUCATION/TRAINING PROGRAM

## 2025-05-03 PROCEDURE — 2500000003 HC RX 250 WO HCPCS: Performed by: INTERNAL MEDICINE

## 2025-05-03 PROCEDURE — 84443 ASSAY THYROID STIM HORMONE: CPT

## 2025-05-03 PROCEDURE — 80069 RENAL FUNCTION PANEL: CPT

## 2025-05-03 PROCEDURE — 6360000002 HC RX W HCPCS: Performed by: INTERNAL MEDICINE

## 2025-05-03 PROCEDURE — 6360000002 HC RX W HCPCS: Performed by: REGISTERED NURSE

## 2025-05-03 PROCEDURE — 94760 N-INVAS EAR/PLS OXIMETRY 1: CPT

## 2025-05-03 PROCEDURE — 36415 COLL VENOUS BLD VENIPUNCTURE: CPT

## 2025-05-03 RX ORDER — PROPRANOLOL HCL 20 MG
20 TABLET ORAL 2 TIMES DAILY
Status: DISCONTINUED | OUTPATIENT
Start: 2025-05-03 | End: 2025-05-05 | Stop reason: HOSPADM

## 2025-05-03 RX ADMIN — Medication 1 TABLET: at 08:41

## 2025-05-03 RX ADMIN — AMLODIPINE BESYLATE 5 MG: 5 TABLET ORAL at 08:40

## 2025-05-03 RX ADMIN — HYDRALAZINE HYDROCHLORIDE 10 MG: 20 INJECTION INTRAMUSCULAR; INTRAVENOUS at 07:21

## 2025-05-03 RX ADMIN — ASPIRIN 81 MG: 81 TABLET, CHEWABLE ORAL at 08:40

## 2025-05-03 RX ADMIN — SODIUM CHLORIDE, PRESERVATIVE FREE 10 ML: 5 INJECTION INTRAVENOUS at 08:40

## 2025-05-03 RX ADMIN — SENNOSIDES, DOCUSATE SODIUM 1 TABLET: 50; 8.6 TABLET, FILM COATED ORAL at 08:40

## 2025-05-03 RX ADMIN — SENNOSIDES, DOCUSATE SODIUM 1 TABLET: 50; 8.6 TABLET, FILM COATED ORAL at 20:15

## 2025-05-03 RX ADMIN — ALLOPURINOL 100 MG: 100 TABLET ORAL at 20:15

## 2025-05-03 RX ADMIN — ALLOPURINOL 100 MG: 100 TABLET ORAL at 08:40

## 2025-05-03 RX ADMIN — POTASSIUM BICARBONATE 40 MEQ: 391 TABLET, EFFERVESCENT ORAL at 08:39

## 2025-05-03 RX ADMIN — SODIUM CHLORIDE, PRESERVATIVE FREE 10 ML: 5 INJECTION INTRAVENOUS at 20:15

## 2025-05-03 RX ADMIN — ENOXAPARIN SODIUM 40 MG: 100 INJECTION SUBCUTANEOUS at 20:15

## 2025-05-03 RX ADMIN — PROPRANOLOL HYDROCHLORIDE 20 MG: 20 TABLET ORAL at 20:15

## 2025-05-03 RX ADMIN — ACETAMINOPHEN 650 MG: 325 TABLET ORAL at 11:20

## 2025-05-03 ASSESSMENT — PAIN DESCRIPTION - DESCRIPTORS: DESCRIPTORS: ACHING

## 2025-05-03 ASSESSMENT — PAIN DESCRIPTION - ORIENTATION: ORIENTATION: RIGHT;LEFT

## 2025-05-03 ASSESSMENT — PAIN DESCRIPTION - LOCATION: LOCATION: KNEE

## 2025-05-03 ASSESSMENT — PAIN SCALES - GENERAL: PAINLEVEL_OUTOF10: 8

## 2025-05-03 NOTE — PLAN OF CARE
Problem: Safety - Adult  Goal: Free from fall injury  5/2/2025 2201 by Toshia Murray, RN  Outcome: Progressing  Flowsheets (Taken 5/2/2025 2201)  Free From Fall Injury: Instruct family/caregiver on patient safety     Problem: Skin/Tissue Integrity  Goal: Skin integrity remains intact  Description: 1.  Monitor for areas of redness and/or skin breakdown2.  Assess vascular access sites hourly3.  Every 4-6 hours minimum:  Change oxygen saturation probe site4.  Every 4-6 hours:  If on nasal continuous positive airway pressure, respiratory therapy assess nares and determine need for appliance change or resting period  5/2/2025 2201 by Toshia Murray, RN  Outcome: Progressing  Flowsheets (Taken 5/2/2025 2201)  Skin Integrity Remains Intact: Monitor for areas of redness and/or skin breakdown     Problem: Neurosensory - Adult  Goal: Achieves stable or improved neurological status  5/2/2025 2201 by Toshia Murray, RN  Outcome: Progressing  Flowsheets (Taken 5/2/2025 2201)  Achieves stable or improved neurological status: Assess for and report changes in neurological status     Problem: Cardiovascular - Adult  Goal: Absence of cardiac dysrhythmias or at baseline  5/2/2025 2201 by Toshia Murray, RN  Outcome: Progressing  Flowsheets (Taken 5/2/2025 2201)  Absence of cardiac dysrhythmias or at baseline: Monitor cardiac rate and rhythm     Problem: Metabolic/Fluid and Electrolytes - Adult  Goal: Electrolytes maintained within normal limits  5/2/2025 2201 by Toshia Murray, RN  Outcome: Progressing  Flowsheets (Taken 5/2/2025 2201)  Electrolytes maintained within normal limits:   Monitor labs and assess patient for signs and symptoms of electrolyte imbalances   Administer electrolyte replacement as ordered

## 2025-05-03 NOTE — PLAN OF CARE
Problem: Safety - Adult  Goal: Free from fall injury  5/3/2025 1042 by Blair Kendall RN  Outcome: Progressing  5/2/2025 2201 by Toshia Murray RN  Outcome: Progressing  Flowsheets (Taken 5/2/2025 2201)  Free From Fall Injury: Instruct family/caregiver on patient safety     Problem: Discharge Planning  Goal: Discharge to home or other facility with appropriate resources  Outcome: Progressing     Problem: Skin/Tissue Integrity  Goal: Skin integrity remains intact  Description: 1.  Monitor for areas of redness and/or skin breakdown2.  Assess vascular access sites hourly3.  Every 4-6 hours minimum:  Change oxygen saturation probe site4.  Every 4-6 hours:  If on nasal continuous positive airway pressure, respiratory therapy assess nares and determine need for appliance change or resting period  5/3/2025 1042 by Blair Kendall RN  Outcome: Progressing  5/2/2025 2201 by Toshia Murray RN  Outcome: Progressing  Flowsheets (Taken 5/2/2025 2201)  Skin Integrity Remains Intact: Monitor for areas of redness and/or skin breakdown     Problem: ABCDS Injury Assessment  Goal: Absence of physical injury  Outcome: Progressing     Problem: Neurosensory - Adult  Goal: Achieves stable or improved neurological status  5/3/2025 1042 by Blair Kendall RN  Outcome: Progressing  5/2/2025 2201 by Toshia Murray RN  Outcome: Progressing  Flowsheets (Taken 5/2/2025 2201)  Achieves stable or improved neurological status: Assess for and report changes in neurological status     Problem: Cardiovascular - Adult  Goal: Absence of cardiac dysrhythmias or at baseline  5/3/2025 1042 by Blair Kendall RN  Outcome: Progressing  5/2/2025 2201 by Tohsia Murray RN  Outcome: Progressing  Flowsheets (Taken 5/2/2025 2201)  Absence of cardiac dysrhythmias or at baseline: Monitor cardiac rate and rhythm     Problem: Metabolic/Fluid and Electrolytes - Adult  Goal: Electrolytes maintained within normal limits  5/3/2025 1042 by Blair Kendall

## 2025-05-03 NOTE — CARE COORDINATION
0950 5/4:    Family is no longer interested in [facility]. Family currently considering other facilities and will let CM/SW know which facilities they are interested in.     Electronically signed by Amrita Jacques RN MSN on 5/4/2025 at 9:51 AM          Spoke with pt's daughter, Phylicia. They are thinking about assisted living for a long term plan and SNF at discharge following this hospitalization.     Family is currently interested in returning to [facility] after this hospital stay while they tour facilities for their mother's long term plan.    Electronically signed by Amrita Jacques RN MSN on 5/3/2025 at 3:56 PM

## 2025-05-04 LAB
1,25(OH)2D3 SERPL-MCNC: 60.3 PG/ML (ref 19.9–79.3)
ALBUMIN SERPL-MCNC: 3.1 G/DL (ref 3.4–5)
ANION GAP SERPL CALCULATED.3IONS-SCNC: 7 MMOL/L (ref 3–16)
BUN SERPL-MCNC: 14 MG/DL (ref 7–20)
CALCIUM SERPL-MCNC: 9.8 MG/DL (ref 8.3–10.6)
CHLORIDE SERPL-SCNC: 108 MMOL/L (ref 99–110)
CO2 SERPL-SCNC: 25 MMOL/L (ref 21–32)
CREAT SERPL-MCNC: 0.9 MG/DL (ref 0.6–1.2)
GFR SERPLBLD CREATININE-BSD FMLA CKD-EPI: 62 ML/MIN/{1.73_M2}
GLUCOSE SERPL-MCNC: 93 MG/DL (ref 70–99)
PHOSPHATE SERPL-MCNC: 3.2 MG/DL (ref 2.5–4.9)
POTASSIUM SERPL-SCNC: 3.6 MMOL/L (ref 3.5–5.1)
PTH RELATED PROT SERPL-SCNC: 3.9 PMOL/L (ref 0–3.4)
SODIUM SERPL-SCNC: 140 MMOL/L (ref 136–145)

## 2025-05-04 PROCEDURE — 2500000003 HC RX 250 WO HCPCS: Performed by: INTERNAL MEDICINE

## 2025-05-04 PROCEDURE — 6370000000 HC RX 637 (ALT 250 FOR IP): Performed by: INTERNAL MEDICINE

## 2025-05-04 PROCEDURE — 36415 COLL VENOUS BLD VENIPUNCTURE: CPT

## 2025-05-04 PROCEDURE — 2060000000 HC ICU INTERMEDIATE R&B

## 2025-05-04 PROCEDURE — 94760 N-INVAS EAR/PLS OXIMETRY 1: CPT

## 2025-05-04 PROCEDURE — 80069 RENAL FUNCTION PANEL: CPT

## 2025-05-04 PROCEDURE — 6370000000 HC RX 637 (ALT 250 FOR IP): Performed by: STUDENT IN AN ORGANIZED HEALTH CARE EDUCATION/TRAINING PROGRAM

## 2025-05-04 RX ORDER — AMLODIPINE BESYLATE 5 MG/1
5 TABLET ORAL DAILY
DISCHARGE
Start: 2025-05-04

## 2025-05-04 RX ORDER — PROPRANOLOL HCL 20 MG
20 TABLET ORAL 2 TIMES DAILY
DISCHARGE
Start: 2025-05-04

## 2025-05-04 RX ADMIN — PROPRANOLOL HYDROCHLORIDE 20 MG: 20 TABLET ORAL at 09:14

## 2025-05-04 RX ADMIN — ALLOPURINOL 100 MG: 100 TABLET ORAL at 09:14

## 2025-05-04 RX ADMIN — PROPRANOLOL HYDROCHLORIDE 20 MG: 20 TABLET ORAL at 20:59

## 2025-05-04 RX ADMIN — Medication 1 TABLET: at 09:24

## 2025-05-04 RX ADMIN — ALLOPURINOL 100 MG: 100 TABLET ORAL at 20:59

## 2025-05-04 RX ADMIN — ACETAMINOPHEN 650 MG: 325 TABLET ORAL at 09:15

## 2025-05-04 RX ADMIN — SODIUM CHLORIDE, PRESERVATIVE FREE 10 ML: 5 INJECTION INTRAVENOUS at 20:59

## 2025-05-04 RX ADMIN — SENNOSIDES, DOCUSATE SODIUM 1 TABLET: 50; 8.6 TABLET, FILM COATED ORAL at 09:14

## 2025-05-04 RX ADMIN — SENNOSIDES, DOCUSATE SODIUM 1 TABLET: 50; 8.6 TABLET, FILM COATED ORAL at 20:59

## 2025-05-04 RX ADMIN — ASPIRIN 81 MG: 81 TABLET, CHEWABLE ORAL at 09:14

## 2025-05-04 RX ADMIN — AMLODIPINE BESYLATE 5 MG: 5 TABLET ORAL at 09:14

## 2025-05-04 ASSESSMENT — PAIN SCALES - GENERAL: PAINLEVEL_OUTOF10: 9

## 2025-05-04 ASSESSMENT — PAIN DESCRIPTION - LOCATION: LOCATION: LEG;BACK

## 2025-05-04 NOTE — PLAN OF CARE
Problem: Safety - Adult  Goal: Free from fall injury  5/3/2025 2203 by Anthony Palma RN  Outcome: Progressing     Problem: Discharge Planning  Goal: Discharge to home or other facility with appropriate resources  5/3/2025 2203 by Anthony Palma RN  Outcome: Progressing     Problem: Skin/Tissue Integrity  Goal: Skin integrity remains intact  Description: 1.  Monitor for areas of redness and/or skin breakdown2.  Assess vascular access sites hourly3.  Every 4-6 hours minimum:  Change oxygen saturation probe site4.  Every 4-6 hours:  If on nasal continuous positive airway pressure, respiratory therapy assess nares and determine need for appliance change or resting period  5/3/2025 2203 by Anthony Palma RN  Outcome: Progressing     Problem: ABCDS Injury Assessment  Goal: Absence of physical injury  5/3/2025 2203 by Anthony Palma RN  Outcome: Progressing     Problem: Neurosensory - Adult  Goal: Achieves stable or improved neurological status  5/3/2025 2203 by Anthony Palma RN  Outcome: Progressing     Problem: Cardiovascular - Adult  Goal: Absence of cardiac dysrhythmias or at baseline  5/3/2025 2203 by Anthony Palma RN  Outcome: Progressing     Problem: Metabolic/Fluid and Electrolytes - Adult  Goal: Electrolytes maintained within normal limits  5/3/2025 2203 by Anthony Palma RN  Outcome: Progressing

## 2025-05-04 NOTE — CARE COORDINATION
5/4 Chart reviewed. Discharge order noted. Call placed to daughter Phylicia [phone] to discuss discharge planning. Message left for return call. Will need [employer] and [employer]. Electronically signed by Nida Bolanos RN on 5/4/2025 at 11:15 AM

## 2025-05-04 NOTE — DISCHARGE SUMMARY
V2.0  Discharge Summary    Name:  Xena Jimenez /Age/Sex: 1940 (84 y.o. female)   Admit Date: 2025  Discharge Date: 25    MRN & CSN:  [id number] & [id number] Encounter Date and Time 25 6:20 PM EDT    Attending:  Guero Berger MD Discharging Provider: Guero Berger MD       Hospital Course:     Brief HPI: Xena Jimenez is a 84 y.o. female who presented with hypercalcemia    Brief Problem Based Course:   Hypercalcemia: Patient presented to the hospital with altered mental status, decreased appetite, and generalized weakness.  Was found to have hypercalcemia to 14.7. Patient was treated with calcitonin and aggressive IV fluids with normalization of calcium levels.  Patient underwent fairly broad hypercalcemia workup.  Ultimately hypercalcemia was felt to be driven by sarcoidosis.  High-res CT had multiple pulmonary nodules and inflammatory markers were elevated.  Patient was instructed to follow-up with her pulmonologist and endocrinologist after discharge  Essential Tremor: Pt noted to have bilateral upper extremity tremor.  Tremor was thought to be most consistent with essential tremor.  Patient was initiated on propranolol to ease her symptoms.  Patient will follow-up with her PCP regarding up titration of dosage needed  Delay in discharge: PT OT recommended SNF at discharge.  Patient will remain in-house pending placement      The patient expressed appropriate understanding of, and agreement with the discharge recommendations, medications, and plan.     Consults this admission:  IP CONSULT TO HOSPITALIST  IP CONSULT TO NEPHROLOGY    Discharge Diagnosis:   Hypercalcemia  Essential tremor    Discharge Instruction:   Follow up appointments:   Primary care physician: Jessy Morales MD within 2 weeks  Follow-up with pulmonology within 2 weeks  Follow-up with endocrinology as directed   Diet: regular diet   Activity: activity as tolerated  Disposition: Discharged to:   []Home, [][facility],

## 2025-05-05 VITALS
HEART RATE: 71 BPM | BODY MASS INDEX: 24.95 KG/M2 | TEMPERATURE: 97.7 F | HEIGHT: 62 IN | OXYGEN SATURATION: 96 % | WEIGHT: 135.58 LBS | DIASTOLIC BLOOD PRESSURE: 57 MMHG | SYSTOLIC BLOOD PRESSURE: 117 MMHG | RESPIRATION RATE: 19 BRPM

## 2025-05-05 LAB
ALBUMIN SERPL-MCNC: 3.2 G/DL (ref 3.4–5)
ANION GAP SERPL CALCULATED.3IONS-SCNC: 12 MMOL/L (ref 3–16)
ANTINUCLEAR AB INTERPRETIVE COMMENT: NORMAL
BUN SERPL-MCNC: 16 MG/DL (ref 7–20)
CALCIUM SERPL-MCNC: 10.3 MG/DL (ref 8.3–10.6)
CHLORIDE SERPL-SCNC: 102 MMOL/L (ref 99–110)
CO2 SERPL-SCNC: 23 MMOL/L (ref 21–32)
CREAT SERPL-MCNC: 1 MG/DL (ref 0.6–1.2)
GFR SERPLBLD CREATININE-BSD FMLA CKD-EPI: 55 ML/MIN/{1.73_M2}
GLUCOSE SERPL-MCNC: 95 MG/DL (ref 70–99)
NUCLEAR IGG SER QL IF: NORMAL
PHOSPHATE SERPL-MCNC: 3.9 MG/DL (ref 2.5–4.9)
POTASSIUM SERPL-SCNC: 3.5 MMOL/L (ref 3.5–5.1)
SODIUM SERPL-SCNC: 137 MMOL/L (ref 136–145)

## 2025-05-05 PROCEDURE — 97110 THERAPEUTIC EXERCISES: CPT

## 2025-05-05 PROCEDURE — 97535 SELF CARE MNGMENT TRAINING: CPT

## 2025-05-05 PROCEDURE — 2500000003 HC RX 250 WO HCPCS: Performed by: INTERNAL MEDICINE

## 2025-05-05 PROCEDURE — 6370000000 HC RX 637 (ALT 250 FOR IP): Performed by: INTERNAL MEDICINE

## 2025-05-05 PROCEDURE — 6370000000 HC RX 637 (ALT 250 FOR IP): Performed by: STUDENT IN AN ORGANIZED HEALTH CARE EDUCATION/TRAINING PROGRAM

## 2025-05-05 PROCEDURE — 36415 COLL VENOUS BLD VENIPUNCTURE: CPT

## 2025-05-05 PROCEDURE — 97530 THERAPEUTIC ACTIVITIES: CPT

## 2025-05-05 PROCEDURE — 80069 RENAL FUNCTION PANEL: CPT

## 2025-05-05 PROCEDURE — 94760 N-INVAS EAR/PLS OXIMETRY 1: CPT

## 2025-05-05 RX ADMIN — SODIUM CHLORIDE, PRESERVATIVE FREE 10 ML: 5 INJECTION INTRAVENOUS at 09:49

## 2025-05-05 RX ADMIN — SENNOSIDES, DOCUSATE SODIUM 1 TABLET: 50; 8.6 TABLET, FILM COATED ORAL at 09:49

## 2025-05-05 RX ADMIN — ASPIRIN 81 MG: 81 TABLET, CHEWABLE ORAL at 09:49

## 2025-05-05 RX ADMIN — Medication 1 TABLET: at 09:49

## 2025-05-05 RX ADMIN — PROPRANOLOL HYDROCHLORIDE 20 MG: 20 TABLET ORAL at 09:49

## 2025-05-05 RX ADMIN — AMLODIPINE BESYLATE 5 MG: 5 TABLET ORAL at 09:49

## 2025-05-05 RX ADMIN — ACETAMINOPHEN 650 MG: 325 TABLET ORAL at 05:10

## 2025-05-05 RX ADMIN — LEVOTHYROXINE SODIUM 88 MCG: 0.09 TABLET ORAL at 05:10

## 2025-05-05 RX ADMIN — ALLOPURINOL 100 MG: 100 TABLET ORAL at 09:49

## 2025-05-05 NOTE — PROGRESS NOTES
V2.0  [facility] Hospitalist Progress Note      Name:  Xena Jimenez /Age/Sex: 1940  (84 y.o. female)   MRN & CSN:  [id number] & [id number] Encounter Date/Time: 2025 9:18 AM EDT    Location:  [id number] PCP: Jessy Morales MD       Hospital Day: 3    Assessment and Plan:   Xena Jimenez is a 84 y.o. female with PMH of pulmonary sarcoid, dermatomyositis presenting with hypercalcemia      Plan:  Hypercalcemia  -Hx of hyperparathyroidism s/p remote parathyroidectomy. Also with history of pulmonary sarcoidosis  -PTH appropriately suppressed. This is a PTH independent phenomenon  -PTHrP in process, but most recent outpatient level mildly elevated  -Anti-SS-A +; CRP and sed rate   -calcitonin, IV fluids with NS  - Discussed with nephrology : Pt has had extensive OP workup. Calcium improving today. Suspect pt may have reactivation of her sarcoidosis causing sarcoidosis. 1,25 hydroxy vit D and PTHrP pending. Recommended high res CT scan. Decision on steroids pending result of imaging   Generalized weakness  -may be related to hypercalcemia  -PT/OT recommending ARU (vs. SNF?)  Acquired hypothyroidism  - Continue home Synthroid  History of dermatomyositis  - Many years ago.  Exam does not suggest active disease  Facial rash  -appearance of rosacea. Seems more prominent today    Ppx: Lovenox  Dispo: PT OT pending    Subjective:     Chief Complaint: Altered Mental Status (Pt arrives to ED via private vehicle with family with c/o AMS, bilateral leg swelling, decreased appetite, and trouble walking. Pt was admitted to the hospital 3 weeks ago after a fall, D/C to rehab facility. Pt was released from rehab facility on Friday, and has been experiencing difficulty getting around her house since. Pt family states that pt has been confused, loss of appetite and leg swelling since Monday. Pt denies SOB. Pt denies hx of CHF. Pt AAO x 4. VSS. )     Interval history  Patient feeling a little better. 
    V2.0  [facility] [place]      Name:  Xena Jimenez /Age/Sex: 1940  (84 y.o. female)   MRN & CSN:  [id number] & [id number] Encounter Date/Time: 5/3/2025 9:18 AM EDT    Location:  [id number] PCP: Jessy Morales MD       Hospital Day: 4    Assessment and Plan:   Xena Jimenez is a 84 y.o. female with PMH of pulmonary sarcoid, dermatomyositis presenting with hypercalcemia      Plan:  Hypercalcemia  -Hx of hyperparathyroidism s/p remote parathyroidectomy. Also with history of pulmonary sarcoidosis  -PTH appropriately suppressed. This is a PTH independent phenomenon  -PTHrP in process, but most recent outpatient level mildly elevated  -Anti-SS-A +; CRP and sed rate   -calcitonin, IV fluids with NS  - Discussed with nephrology 5/3: Calcium is normalized. Suspect pulmonary sarcoid as primary  of hypercalcemia.  Recommend outpatient follow-up with primary pulmonologist  Generalized weakness  -may be related to hypercalcemia  -PT/OT recommending ARU vs. SNF  Tremor  -suspect essential tremor  -Trial propranolol  Acquired hypothyroidism  - Continue home Synthroid  -check TSH  History of dermatomyositis  - Many years ago.  Exam does not suggest active disease  Facial rash  -appearance of rosacea. Better today compared to previous evaluatin    Ppx: Lovenox  Dispo: SNF, Anticipate Readiness for discharge tomorrow    Subjective:     Chief Complaint: Altered Mental Status (Pt arrives to ED via private vehicle with family with c/o AMS, bilateral leg swelling, decreased appetite, and trouble walking. Pt was admitted to the hospital 3 weeks ago after a fall, D/C to rehab facility. Pt was released from rehab facility on Friday, and has been experiencing difficulty getting around her house since. Pt family states that pt has been confused, loss of appetite and leg swelling since Monday. Pt denies SOB. Pt denies hx of CHF. Pt AAO x 4. VSS. )     Interval history  Patient feeling a little 
  [facility] - Occupational Therapy   Phone: [phone]    Occupational Therapy  Facility/Department:[address] PROGRESSIVE CARE    [] Initial Evaluation            [x] Daily Treatment Note         [] Discharge Summary      Patient: Xena Jimenez   : 1940   MRN: [id number]   Date of Service:  2025    Staff Mobility Recommendation: stedy x1    AM-PAC Score: 16/24  Discharge Recommendations: cont therapy (3-5x/week)    Xena Jimenez scored a 16/24 on the AM-PAC ADL Inpatient form. Current research shows that an AM-PAC score of 17 or less is typically not associated with a discharge to the patient's home setting. Based on the patient's AM-PAC score and their current ADL deficits, it is recommended that the patient have 3-5 sessions per week of Occupational Therapy at d/c to increase the patient's independence.  Please see assessment section for further patient specific details.    If patient discharges prior to next session this note will serve as a discharge summary.  Please see below for the latest assessment towards goals.      Admitting Diagnosis:  Hypercalcemia  Ordering Physician: Yvette Wiley MD   Current Admission Summary: Pt is an 83 yo F admitted with hypercalcemia after presenting with c/o AMS, bilateral leg swelling, decreased appetite, and trouble walking. Pt recently hospitalized ~3 weeks ago and d/c to rehab facility, d/c home from facility on Friday, .      Past Medical History:  has a past medical history of Achalasia of esophagus, Acquired hypothyroidism, Bell's palsy, Dermatomyositis (HCC), Dyslipidemia, Essential hypertension, Gout, Hyperparathyroidism , secondary, non-renal, Multinodular goiter, Sarcoidosis of lung, Tracheobronchomalacia, and Vitamin D deficiency.  Past Surgical History:  has a past surgical history that includes Appendectomy and Cholecystectomy.    Assessment  Activity Tolerance: Good  Impairments Requiring Therapeutic Intervention: decreased 
  [facility] - Physical Therapy   Phone: [phone]    Physical Therapy  Facility/Department:[address] PROGRESSIVE CARE    [] Initial Evaluation            [x] Daily Treatment Note         [] Discharge Summary      Patient: Xena Jimenez   : 1940   MRN: [id number]   Date of Service:  2025  Staff Mobility Recommendation: RW x 1 with gait belt    AM-PAC score: 16/24  Discharge Recommendations: ARU    Xean Jimenez scored a 16/24 on the AM-PAC short mobility form. Current research shows that an AM-PAC score of 17 or less is typically not associated with a discharge to the patient's home setting. Based on the patient's AM-PAC score and their current functional mobility deficits, it is recommended that the patient have 5-7 sessions per week of Physical Therapy at d/c to increase the patient's independence.  At this time, this patient demonstrates complex nursing, medical, and rehabilitative needs, and would benefit from intensive rehabilitation services upon discharge from the Inpatient setting.  This patient demonstrates the ability to participate in and benefit from an intensive therapy program with a coordinated interdisciplinary team approach to foster frequent, structured, and documented communication among disciplines, who will work together to establish, prioritize, and achieve treatment goals. Please see assessment section for further patient specific details.    If patient discharges prior to next session this note will serve as a discharge summary.  Please see below for the latest assessment towards goals.      Admitting Diagnosis: Hypercalcemia  Ordering Physician: Dr. Wiley  Current Admission Summary: Pt is an 84 y.o. female who presented to the ED on 25 with weakness, AMS.     Past Medical History:  has a past medical history of Achalasia of esophagus, Acquired hypothyroidism, Bell's palsy, Dermatomyositis (HCC), Dyslipidemia, Essential hypertension, Gout, Hyperparathyroidism , 
  [facility] - Physical Therapy   Phone: [phone]    Physical Therapy  Facility/Department:[address] PROGRESSIVE CARE    [x] Initial Evaluation            [] Daily Treatment Note         [] Discharge Summary      Patient: Xena Jimenez   : 1940   MRN: [id number]   Date of Service:  2025  Staff Mobility Recommendation: RW x 1 with gait belt    AM-PAC score: 16/24  Discharge Recommendations: ARU    Xena Jimenez scored a 16/24 on the AM-PAC short mobility form. Current research shows that an AM-PAC score of 17 or less is typically not associated with a discharge to the patient's home setting. Based on the patient's AM-PAC score and their current functional mobility deficits, it is recommended that the patient have 5-7 sessions per week of Physical Therapy at d/c to increase the patient's independence.  At this time, this patient demonstrates complex nursing, medical, and rehabilitative needs, and would benefit from intensive rehabilitation services upon discharge from the Inpatient setting.  This patient demonstrates the ability to participate in and benefit from an intensive therapy program with a coordinated interdisciplinary team approach to foster frequent, structured, and documented communication among disciplines, who will work together to establish, prioritize, and achieve treatment goals. Please see assessment section for further patient specific details.    If patient discharges prior to next session this note will serve as a discharge summary.  Please see below for the latest assessment towards goals.      Admitting Diagnosis: Hypercalcemia  Ordering Physician: Dr. Wiley  Current Admission Summary: Pt is an 84 y.o. female who presented to the ED on 25 with weakness, AMS.     Past Medical History:  has a past medical history of Achalasia of esophagus, Acquired hypothyroidism, Bell's palsy, Dermatomyositis (HCC), Dyslipidemia, Essential hypertension, Gout, Hyperparathyroidism , 
  Nephrology Progress Note   [facility]      Reason for Consultation: Hypercalcemia    History of Present Illness: Ms Jimenez is an 85 yo female with a past medical history significant for pulmonary sarcoidosis, dermatomyositis, gout, hypertension and history of hyperparathyroidism diagnosed in 2013 when she presented with hypercalcemia    She was diagnosed with primary hyperparathyroidism and underwent right inferior  parathyroidectomy in 2013 per Dr. Grimaldo    Her calcium has since been normalized and her PTH had been in the normal or low normal range    She has followed with Dr. Coyne and her last PTH was 23.7 with a calcium of 10 around 2024 her PTHrp has been mildly elevated    She now presents with several days history of increasing fatigue, weakness, constipation and confusion. She has had decreased appetite and oral intake    Subjective:    Awake, NAD, daughter at bedside    Scheduled Meds:   propranolol  20 mg Oral BID    amLODIPine  5 mg Oral Daily    sodium chloride flush  5-40 mL IntraVENous 2 times per day    enoxaparin  40 mg SubCUTAneous Nightly    sennosides-docusate sodium  1 tablet Oral BID    allopurinol  100 mg Oral BID    aspirin  81 mg Oral Daily    levothyroxine  88 mcg Oral Once per day on     therapeutic multivitamin-minerals  1 tablet Oral Daily        sodium chloride         PRN Meds:.hydrALAZINE, sodium chloride flush, sodium chloride, potassium chloride **OR** potassium alternative oral replacement **OR** potassium chloride, magnesium sulfate, ondansetron **OR** ondansetron, polyethylene glycol, acetaminophen **OR** acetaminophen, bisacodyl, aluminum & magnesium hydroxide-simethicone    Physical Exam:    TEMPERATURE:  Current - Temp: 98.1 °F (36.7 °C); Max - Temp  Av.3 °F (36.8 °C)  Min: 98 °F (36.7 °C)  Max: 99.1 °F (37.3 °C)  RESPIRATIONS RANGE: Resp  Av.3  Min: 18  Max: 25  PULSE RANGE: Pulse  Av.5  Min: 
  [facility]  Phone: [phone]  Fax: [phone]  [place]         Reason for Consult: Hypercalcemia  Requesting Physician:  Dr. Berger    History Obtained From:  patient, daughter, electronic medical record    History of Present Ilness: 84-year-old pleasant white female with history of pulmonary sarcoidosis, dermatomyositis, gout, hypertension and history of hyperparathyroidism diagnosed in September 2013 when she presented with hypercalcemia  She was diagnosed with primary hyperparathyroidism and underwent right inferior parathyroidectomy in September 2013 per Dr. Grimaldo  Her calcium has since been normalized and her PTH had been in the normal or low normal range  She has followed with Dr. Coyne and her last PTH was 23.7 with a calcium of 10 around October 2024 her PTHrp has been mildly elevated  She now presents with several days history of increasing fatigue, weakness, constipation and confusion  She has had decreased appetite and oral intake  No report of nausea vomiting diarrhea  Noted to have serum calcium of 14.7 mg  She has been on levothyroxine but no intake of calcium supplementation, vitamin D, vitamin A  She is awake alert answering questions appropriately at this time  Notes some lower back pain  No history of kidney stones denies dysuria hematuria      Review of Systems: no tremors or knee pain today  Weakness improving  No nausea vomiting  Family present     Physical exam:   Constitutional:  VITALS:  /64   Pulse 68   Temp 97.8 °F (36.6 °C) (Oral)   Resp 17   Ht 1.575 m (5' 2\")   Wt 63.2 kg (139 lb 5.3 oz)   SpO2 97%   BMI 25.48 kg/m²   Gen: Elderly alert, awake, nad  Skin: Erythema noted over cheeks, turgor wnl  Heent:  eomi, mmm  Neck: no bruits or jvd noted, thyroid normal  Cardiovascular:  S1, S2 without m/r/g  Respiratory: CTA B without w/r/r; respiratory effort normal  Abdomen:  +bs, soft, nt, nd  Ext: No lower extremity edema    Data/  CBC:   Lab 
  [facility]  Phone: [phone]  Fax: [phone]  [place]         Reason for Consult: Hypercalcemia  Requesting Physician:  Dr. Berger    History Obtained From:  patient, daughter, electronic medical record    History of Present Ilness: 84-year-old pleasant white female with history of pulmonary sarcoidosis, dermatomyositis, gout, hypertension and history of hyperparathyroidism diagnosed in September 2013 when she presented with hypercalcemia  She was diagnosed with primary hyperparathyroidism and underwent right inferior parathyroidectomy in September 2013 per Dr. Grimaldo  Her calcium has since been normalized and her PTH had been in the normal or low normal range  She has followed with Dr. Coyne and her last PTH was 23.7 with a calcium of 10 around October 2024 her PTHrp has been mildly elevated  She now presents with several days history of increasing fatigue, weakness, constipation and confusion  She has had decreased appetite and oral intake  No report of nausea vomiting diarrhea  Noted to have serum calcium of 14.7 mg  She has been on levothyroxine but no intake of calcium supplementation, vitamin D, vitamin A  She is awake alert answering questions appropriately at this time  Notes some lower back pain  No history of kidney stones denies dysuria hematuria      Review of Systems: feels better  Weakness improving  No nausea vomiting    Physical exam:   Constitutional:  VITALS:  BP (!) 145/88   Pulse (!) 101   Temp 98 °F (36.7 °C) (Oral)   Resp 18   Ht 1.575 m (5' 2\")   Wt 63 kg (138 lb 14.2 oz)   SpO2 95%   BMI 25.40 kg/m²   Gen: Elderly alert, awake, nad  Skin: Erythema noted over cheeks, turgor wnl  Heent:  eomi, mmm  Neck: no bruits or jvd noted, thyroid normal  Cardiovascular:  S1, S2 without m/r/g  Respiratory: CTA B without w/r/r; respiratory effort normal  Abdomen:  +bs, soft, nt, nd, no hepatosplenomegaly  Ext: No lower extremity edema    Data/  CBC:   Lab Results 
  [facility]  Phone: [phone]  Fax: [phone]  [place]         Reason for Consult: Hypercalcemia  Requesting Physician:  Dr. Berger    History Obtained From:  patient, daughter, electronic medical record    History of Present Ilness: 84-year-old pleasant white female with history of pulmonary sarcoidosis, dermatomyositis, gout, hypertension and history of hyperparathyroidism diagnosed in September 2013 when she presented with hypercalcemia  She was diagnosed with primary hyperparathyroidism and underwent right inferior parathyroidectomy in September 2013 per Dr. Grimaldo  Her calcium has since been normalized and her PTH had been in the normal or low normal range  She has followed with Dr. Coyne and her last PTH was 23.7 with a calcium of 10 around October 2024 her PTHrp has been mildly elevated  She now presents with several days history of increasing fatigue, weakness, constipation and confusion  She has had decreased appetite and oral intake  No report of nausea vomiting diarrhea  Noted to have serum calcium of 14.7 mg  She has been on levothyroxine but no intake of calcium supplementation, vitamin D, vitamin A  She is awake alert answering questions appropriately at this time  Notes some lower back pain  No history of kidney stones denies dysuria hematuria      Review of Systems: notes tremors in hands and pain in knees   Weakness improving  No nausea vomiting  Family present     Physical exam:   Constitutional:  VITALS:  BP (!) 143/67   Pulse 86   Temp 97.9 °F (36.6 °C) (Oral)   Resp 18   Ht 1.575 m (5' 2\")   Wt 63.2 kg (139 lb 5.3 oz)   SpO2 95%   BMI 25.48 kg/m²   Gen: Elderly alert, awake, nad  Skin: Erythema noted over cheeks, turgor wnl  Heent:  eomi, mmm  Neck: no bruits or jvd noted, thyroid normal  Cardiovascular:  S1, S2 without m/r/g  Respiratory: CTA B without w/r/r; respiratory effort normal  Abdomen:  +bs, soft, nt, nd  Ext: No lower extremity 
  [facility] - Physical Therapy   Phone: [phone]    Physical Therapy  Facility/Department:[address] PROGRESSIVE CARE    [] Initial Evaluation            [x] Daily Treatment Note         [] Discharge Summary      Patient: Xena Jimenez   : 1940   MRN: [id number]   Date of Service:  2025  Staff Mobility Recommendation: RW x 1 with gait belt    AM-PAC score: 15/24  Discharge Recommendations: ARU    Xena Jimenez scored a 15/24 on the AM-PAC short mobility form. Current research shows that an AM-PAC score of 17 or less is typically not associated with a discharge to the patient's home setting. Based on the patient's AM-PAC score and their current functional mobility deficits, it is recommended that the patient have 5-7 sessions per week of Physical Therapy at d/c to increase the patient's independence.  At this time, this patient demonstrates complex nursing, medical, and rehabilitative needs, and would benefit from intensive rehabilitation services upon discharge from the Inpatient setting.  This patient demonstrates the ability to participate in and benefit from an intensive therapy program with a coordinated interdisciplinary team approach to foster frequent, structured, and documented communication among disciplines, who will work together to establish, prioritize, and achieve treatment goals. Please see assessment section for further patient specific details.    If patient discharges prior to next session this note will serve as a discharge summary.  Please see below for the latest assessment towards goals.      Admitting Diagnosis: Hypercalcemia  Ordering Physician: Dr. Wiley  Current Admission Summary: Pt is an 84 y.o. female who presented to the ED on 25 with weakness, AMS.     Past Medical History:  has a past medical history of Achalasia of esophagus, Acquired hypothyroidism, Bell's palsy, Dermatomyositis (HCC), Dyslipidemia, Essential hypertension, Gout, Hyperparathyroidism , 
4 Eyes Skin Assessment     NAME:  Xena Jimenez  YOB: 1940  MEDICAL RECORD NUMBER:  [id number]    The patient is being assessed for  Admission    I agree that at least one RN has performed a thorough Head to Toe Skin Assessment on the patient. ALL assessment sites listed below have been assessed.      Areas assessed by both nurses:    Head, Face, Ears, Shoulders, Back, Chest, Arms, Elbows, Hands, Sacrum. Buttock, Coccyx, Ischium, Legs. Feet and Heels, and Under Medical Devices         Does the Patient have a Wound? Yes wound(s) were present on assessment. LDA wound assessment was Initiated and completed by RN (non blanchable Redness /Excoriation between buttocks)       Tim Prevention initiated by RN: Yes  Wound Care Orders initiated by RN: Yes    Pressure Injury (Stage 3,4, Unstageable, DTI, NWPT, and Complex wounds) if present, place Wound referral order by RN under : No    New Ostomies, if present place, Ostomy referral order under : No     Nurse 1 eSignature: Electronically signed by Toshia Murray RN on 4/30/25 at 10:42 PM EDT    **SHARE this note so that the co-signing nurse can place an eSignature**    Nurse 2 eSignature: Electronically signed by Jeff Steele RN on 4/30/25 at 10:44 PM EDT    
Discharge orders acknowledged by RN . Discharge teaching completed with pt. RN attempted to call report to [facility] without success- no answer from nursing home when called.  AVS reviewed and all questions answered. Medication regimen reviewed and pt understands schedule. Follow up appointments also reviewed with pt and resources given for discharge. Pt was sent with a med list to [facility]. IV removed. Bedside monitor removed from pt. Pt vitals WDL. Pt discharged with all belongings to [facility] with [facility]. Pt transported off of unit via stretcher. No complications.     Electronically signed by Sheri Rushing RN on 5/5/2025 at 6:43 PM    
Dr. Velasquez Barrera, ED MD at patient bedside. Updated on critical calcium level.   
Pharmacy Medication Reconciliation Note     List of medications patient is currently taking is complete.    Source of information:   1. Discussion with patient's daughter  2. Epic records (dispense report)    Notes regarding home medications:   1. Medication list up to date-no changes made    Charley Temple PharmD, BCPS  5/1/2025 9:42 AM    
RN attempted to call report to Magdi Saab.  No answer at this time.     Electronically signed by Sheri Rushing RN on 5/5/2025 at 6:46 PM    
Report given to LENORA Leon to resume care. All questions answered. Patient in bed, VSS on room air with family at bedside.   
PRN  acetaminophen, 650 mg, Q6H PRN   Or  acetaminophen, 650 mg, Q6H PRN  bisacodyl, 5 mg, Daily PRN  aluminum & magnesium hydroxide-simethicone, 30 mL, Q6H PRN        Labs      Recent Results (from the past 24 hours)   Renal Function Panel    Collection Time: 05/05/25  5:16 AM   Result Value Ref Range    Sodium 137 136 - 145 mmol/L    Potassium 3.5 3.5 - 5.1 mmol/L    Chloride 102 99 - 110 mmol/L    CO2 23 21 - 32 mmol/L    Anion Gap 12 3 - 16    Glucose 95 70 - 99 mg/dL    BUN 16 7 - 20 mg/dL    Creatinine 1.0 0.6 - 1.2 mg/dL    Est, Glom Filt Rate 55 (A) >60    Calcium 10.3 8.3 - 10.6 mg/dL    Phosphorus 3.9 2.5 - 4.9 mg/dL    Albumin 3.2 (L) 3.4 - 5.0 g/dL        Imaging/Diagnostics Last 24 Hours   XR CHEST PORTABLE  Result Date: 4/30/2025  EXAMINATION: ONE XRAY VIEW OF THE CHEST 4/30/2025 5:40 pm COMPARISON: 05/15/2013 HISTORY: ORDERING SYSTEM PROVIDED HISTORY: generalized weakness TECHNOLOGIST PROVIDED HISTORY: Reason for exam:->generalized weakness Reason for Exam: altered mental status/ chest pains FINDINGS: The heart is borderline enlarged but unchanged.  The pulmonary vessels are normal.  The lungs are mildly hyperinflated.  No consolidation or effusion is seen.  The bones are intact.  The aorta is mildly dilated and ectatic which is unchanged.     Stable chronic changes with no acute abnormality seen.       Electronically signed by Guero Berger MD on 5/5/2025 at 8:34 AM    
ng/mL   Basic Metabolic Panel w/ Reflex to MG    Collection Time: 05/01/25  4:17 AM   Result Value Ref Range    Sodium 140 136 - 145 mmol/L    Potassium reflex Magnesium 3.6 3.5 - 5.1 mmol/L    Chloride 105 99 - 110 mmol/L    CO2 27 21 - 32 mmol/L    Anion Gap 8 3 - 16    Glucose 97 70 - 99 mg/dL    BUN 19 7 - 20 mg/dL    Creatinine 0.9 0.6 - 1.2 mg/dL    Est, Glom Filt Rate 62 >60    Calcium 12.6 (HH) 8.3 - 10.6 mg/dL   Phosphorus    Collection Time: 05/01/25  4:17 AM   Result Value Ref Range    Phosphorus 3.0 2.5 - 4.9 mg/dL   CBC with Auto Differential    Collection Time: 05/01/25  4:17 AM   Result Value Ref Range    WBC 6.8 4.0 - 11.0 K/uL    RBC 4.16 4.00 - 5.20 M/uL    Hemoglobin 12.1 12.0 - 16.0 g/dL    Hematocrit 36.8 36.0 - 48.0 %    MCV 88.5 80.0 - 100.0 fL    MCH 29.1 26.0 - 34.0 pg    MCHC 32.9 31.0 - 36.0 g/dL    RDW 15.7 (H) 12.4 - 15.4 %    Platelets 233 135 - 450 K/uL    MPV 8.3 5.0 - 10.5 fL    Neutrophils % 65.3 %    Lymphocytes % 14.2 %    Monocytes % 13.4 %    Eosinophils % 6.3 %    Basophils % 0.8 %    Neutrophils Absolute 4.4 1.7 - 7.7 K/uL    Lymphocytes Absolute 1.0 1.0 - 5.1 K/uL    Monocytes Absolute 0.9 0.0 - 1.3 K/uL    Eosinophils Absolute 0.4 0.0 - 0.6 K/uL    Basophils Absolute 0.1 0.0 - 0.2 K/uL        Imaging/Diagnostics Last 24 Hours   XR CHEST PORTABLE  Result Date: 4/30/2025  EXAMINATION: ONE XRAY VIEW OF THE CHEST 4/30/2025 5:40 pm COMPARISON: 05/15/2013 HISTORY: ORDERING SYSTEM PROVIDED HISTORY: generalized weakness TECHNOLOGIST PROVIDED HISTORY: Reason for exam:->generalized weakness Reason for Exam: altered mental status/ chest pains FINDINGS: The heart is borderline enlarged but unchanged.  The pulmonary vessels are normal.  The lungs are mildly hyperinflated.  No consolidation or effusion is seen.  The bones are intact.  The aorta is mildly dilated and ectatic which is unchanged.     Stable chronic changes with no acute abnormality seen.       Electronically signed by Guero 
balance to SBA for improved ADL completion  Patient will complete HEP with IND to address BUE strength/activity tolerance for ADLs  Patient will complete bed mobility with modified independent    Above goals reviewed on 5/1/2025.  All goals are ongoing at this time unless indicated above.       Therapy Session Time     Individual Group Co-treatment   Time In 0825      Time Out 0905      Minutes 40           Timed Code Treatment Minutes: 25 Minutes  Total Treatment Minutes:  40 minutes       Minutes per charge:  Low complexity eval: 15 minutes  Therapeutic activity: 10 minutes  ADL training: 15 minutes      Electronically signed by Patricia Ramirez OT on 5/1/2025 at 9:09 AM

## 2025-05-05 NOTE — PLAN OF CARE
Problem: Safety - Adult  Goal: Free from fall injury  Outcome: Progressing     Problem: Discharge Planning  Goal: Discharge to home or other facility with appropriate resources  Outcome: Progressing     Problem: Skin/Tissue Integrity  Goal: Skin integrity remains intact  Description: 1.  Monitor for areas of redness and/or skin breakdown2.  Assess vascular access sites hourly3.  Every 4-6 hours minimum:  Change oxygen saturation probe site4.  Every 4-6 hours:  If on nasal continuous positive airway pressure, respiratory therapy assess nares and determine need for appliance change or resting period  Outcome: Progressing     Problem: ABCDS Injury Assessment  Goal: Absence of physical injury  Outcome: Progressing     Problem: Neurosensory - Adult  Goal: Achieves stable or improved neurological status  Outcome: Progressing     Problem: Cardiovascular - Adult  Goal: Absence of cardiac dysrhythmias or at baseline  Outcome: Progressing     Problem: Metabolic/Fluid and Electrolytes - Adult  Goal: Electrolytes maintained within normal limits  Outcome: Progressing

## 2025-05-05 NOTE — CARE COORDINATION
CHOLO spoke to daughter Nisha and she advised that she would want therapy to see her today as she has been in touch with [facility]. She is hopeful that mom can transition from [facility] to there with family support. CHOLO did advise the [facility] does not provide 24/7 supervision unless it's previously arranged and paid for by the family.     CHOLO advised that [facility] is the backup facility.     We will review updated therapy notes and reach out to the [facility] admissions team this morning. They might request to do an onsite visit.     If SNF patient will need a prior authorization from [facility].     Respectfully submitted,    Ksenia HENRIQUEZ, KYA-S  [facility]   [phone]    Electronically signed by MARTINEZ Mcdonald, LITZY on 5/5/2025 at 8:44 AM

## 2025-05-05 NOTE — CARE COORDINATION
CHOLO reached out to Saranya at the [facility] (Intake Liaison for assistant and independent living).     CHOLO explained that family would prefer for her to discharge from [facility] to them and they want her to have therapy on site.     Matt stated that they are open to it but they need to see most recent therapy notes and H&P. CHOLO faxed them to her review today at [phone].    We are currently waiting on a response.     Respectfully submitted,    KYA Padron-S  [facility]   [phone]    Electronically signed by MARTINEZ Mcdonald, LITZY on 5/5/2025 at 3:09 PM

## 2025-05-05 NOTE — CARE COORDINATION
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]  Intermittent confusion [R41.0]   Date / Time: 4/30/2025  5:02 PM    Financial:  Payor: HU[employer] MEDICARE / Plan: HU[employer] CHOICE-PPO MEDICARE / Product Type: *No Product type* /      Pharmacy:    [facility] [id number] - [facility] [address] [place] [address] - P [phone] - F [phone]  [address] [place] [address]  Phone: [phone] Fax: [phone]      Assistance purchasing medications?: Potential Assistance Purchasing Medications: No  Assistance provided by Case Management: None at this time    DISCHARGE Disposition: Skilled Nursing Facility (SNF)    Nursing Facility:   Name: [facility]  Address:  [address] [place] [address]   Report Phone:  [phone]  Fax:  [phone]     LOC at discharge: Skilled Nursing  ИРИНА Completed: Yes              Notification completed in HENS/PAS?:  Yes : CM has completed HENS online through secure website for SNF admission [facility].   Document ID #:  [id number]    Transportation:  Transportation PLAN for discharge: EMS transportation   Mode of Transport: Ambulance stretcher - BLS  Name of Transport Company: [employer]  Phone: [phone]  Time of Transport: 1815    Transport form completed: Yes    IMM Completed:   Yes, Case management has presented and reviewed IMM letter #2.       IMM Letter date given:: 05/05/25  IMM Letter time given:: 0915.   Patient and/or family/POA verbalized understanding of their medicare rights and appeal process if needed. Patient and/or family/POA has signed, initialed and placed the date and time on IMM letter #2 on the the appropriate lines. Copy of letter offered and they are aware that the original copy of IMM letter #2 is available prior to discharge from the

## 2025-05-05 NOTE — CARE COORDINATION
[facility]ONE HOME AUTHORIZATION INFORMATION      LOCATION: [facility]     EFFECTIVE DATE:  05/05/2025-05/07/2025    NEXT REVIEW DATE: 05/07/2025     AUTH#:   [id number]       Electronically signed by Davina Chang on 5/5/2025 at 3:40 PM   Case Management Assistant  Phone:  [phone] - Fax:  [phone]

## 2025-07-09 ENCOUNTER — HOSPITAL ENCOUNTER (OUTPATIENT)
Dept: WOMENS IMAGING | Age: 85
Discharge: HOME | End: 2025-07-09
Payer: MEDICARE

## 2025-07-09 VITALS — HEIGHT: 62 IN | BODY MASS INDEX: 24.84 KG/M2 | WEIGHT: 135 LBS

## 2025-07-09 DIAGNOSIS — Z12.31 BREAST CANCER SCREENING BY MAMMOGRAM: ICD-10-CM

## 2025-07-09 PROCEDURE — 77063 BREAST TOMOSYNTHESIS BI: CPT
